# Patient Record
Sex: MALE | Race: WHITE | NOT HISPANIC OR LATINO | Employment: OTHER | URBAN - METROPOLITAN AREA
[De-identification: names, ages, dates, MRNs, and addresses within clinical notes are randomized per-mention and may not be internally consistent; named-entity substitution may affect disease eponyms.]

---

## 2018-02-19 ENCOUNTER — HOSPITAL ENCOUNTER (OUTPATIENT)
Dept: LAB | Facility: CLINIC | Age: 63
Discharge: HOME | End: 2018-02-19
Attending: ORTHOPAEDIC SURGERY | Admitting: ORTHOPAEDIC SURGERY
Payer: COMMERCIAL

## 2018-02-19 DIAGNOSIS — Z01.812 PRE-OPERATIVE LABORATORY EXAMINATION: ICD-10-CM

## 2018-02-19 LAB
MRSA DNA SPEC QL NAA+PROBE: NEGATIVE
SPECIMEN SOURCE: NORMAL

## 2018-02-19 PROCEDURE — 40000829 ZZHCL STATISTIC STAPH AUREUS SUSCEPT SCREEN PCR: Performed by: ORTHOPAEDIC SURGERY

## 2018-02-19 PROCEDURE — 87640 STAPH A DNA AMP PROBE: CPT | Performed by: ORTHOPAEDIC SURGERY

## 2018-02-19 PROCEDURE — 87641 MR-STAPH DNA AMP PROBE: CPT | Performed by: ORTHOPAEDIC SURGERY

## 2018-02-19 PROCEDURE — 40000830 ZZHCL STATISTIC STAPH AUREUS METH RESIST SCREEN PCR: Performed by: ORTHOPAEDIC SURGERY

## 2018-03-02 ENCOUNTER — TRANSFERRED RECORDS (OUTPATIENT)
Dept: HEALTH INFORMATION MANAGEMENT | Facility: CLINIC | Age: 63
End: 2018-03-02

## 2018-03-15 ENCOUNTER — ANESTHESIA (OUTPATIENT)
Dept: SURGERY | Facility: CLINIC | Age: 63
DRG: 470 | End: 2018-03-15
Payer: COMMERCIAL

## 2018-03-15 ENCOUNTER — HOSPITAL ENCOUNTER (INPATIENT)
Facility: CLINIC | Age: 63
LOS: 2 days | Discharge: HOME | DRG: 470 | End: 2018-03-17
Attending: ORTHOPAEDIC SURGERY | Admitting: ORTHOPAEDIC SURGERY
Payer: COMMERCIAL

## 2018-03-15 ENCOUNTER — ANESTHESIA EVENT (OUTPATIENT)
Dept: SURGERY | Facility: CLINIC | Age: 63
DRG: 470 | End: 2018-03-15
Payer: COMMERCIAL

## 2018-03-15 ENCOUNTER — APPOINTMENT (OUTPATIENT)
Dept: PHYSICAL THERAPY | Facility: CLINIC | Age: 63
DRG: 470 | End: 2018-03-15
Attending: ORTHOPAEDIC SURGERY
Payer: COMMERCIAL

## 2018-03-15 DIAGNOSIS — Z96.652 S/P TOTAL KNEE REPLACEMENT USING CEMENT, LEFT: Primary | ICD-10-CM

## 2018-03-15 LAB
CREAT SERPL-MCNC: 0.88 MG/DL (ref 0.66–1.25)
GFR SERPL CREATININE-BSD FRML MDRD: 87 ML/MIN/1.7M2
GLUCOSE BLDC GLUCOMTR-MCNC: 108 MG/DL (ref 70–99)
GLUCOSE BLDC GLUCOMTR-MCNC: 130 MG/DL (ref 70–99)
PLATELET # BLD AUTO: 215 10E9/L (ref 150–450)
POTASSIUM SERPL-SCNC: 4.6 MMOL/L (ref 3.4–5.3)

## 2018-03-15 PROCEDURE — 82565 ASSAY OF CREATININE: CPT | Performed by: PHYSICIAN ASSISTANT

## 2018-03-15 PROCEDURE — 36000063 ZZH SURGERY LEVEL 4 EA 15 ADDTL MIN: Performed by: ORTHOPAEDIC SURGERY

## 2018-03-15 PROCEDURE — 40000170 ZZH STATISTIC PRE-PROCEDURE ASSESSMENT II: Performed by: ORTHOPAEDIC SURGERY

## 2018-03-15 PROCEDURE — 25000125 ZZHC RX 250: Performed by: ORTHOPAEDIC SURGERY

## 2018-03-15 PROCEDURE — 27210995 ZZH RX 272: Performed by: ORTHOPAEDIC SURGERY

## 2018-03-15 PROCEDURE — 97161 PT EVAL LOW COMPLEX 20 MIN: CPT | Mod: GP | Performed by: PHYSICAL THERAPIST

## 2018-03-15 PROCEDURE — 25000128 H RX IP 250 OP 636: Performed by: PHYSICIAN ASSISTANT

## 2018-03-15 PROCEDURE — 25000125 ZZHC RX 250: Performed by: ANESTHESIOLOGY

## 2018-03-15 PROCEDURE — 71000012 ZZH RECOVERY PHASE 1 LEVEL 1 FIRST HR: Performed by: ORTHOPAEDIC SURGERY

## 2018-03-15 PROCEDURE — 36000093 ZZH SURGERY LEVEL 4 1ST 30 MIN: Performed by: ORTHOPAEDIC SURGERY

## 2018-03-15 PROCEDURE — 25000128 H RX IP 250 OP 636: Performed by: ANESTHESIOLOGY

## 2018-03-15 PROCEDURE — 25000128 H RX IP 250 OP 636: Performed by: NURSE ANESTHETIST, CERTIFIED REGISTERED

## 2018-03-15 PROCEDURE — 97110 THERAPEUTIC EXERCISES: CPT | Mod: GP | Performed by: PHYSICAL THERAPIST

## 2018-03-15 PROCEDURE — C1776 JOINT DEVICE (IMPLANTABLE): HCPCS | Performed by: ORTHOPAEDIC SURGERY

## 2018-03-15 PROCEDURE — 71000013 ZZH RECOVERY PHASE 1 LEVEL 1 EA ADDTL HR: Performed by: ORTHOPAEDIC SURGERY

## 2018-03-15 PROCEDURE — 0SRD0J9 REPLACEMENT OF LEFT KNEE JOINT WITH SYNTHETIC SUBSTITUTE, CEMENTED, OPEN APPROACH: ICD-10-PCS | Performed by: ORTHOPAEDIC SURGERY

## 2018-03-15 PROCEDURE — 84132 ASSAY OF SERUM POTASSIUM: CPT | Performed by: ANESTHESIOLOGY

## 2018-03-15 PROCEDURE — 27110028 ZZH OR GENERAL SUPPLY NON-STERILE: Performed by: ORTHOPAEDIC SURGERY

## 2018-03-15 PROCEDURE — 37000009 ZZH ANESTHESIA TECHNICAL FEE, EACH ADDTL 15 MIN: Performed by: ORTHOPAEDIC SURGERY

## 2018-03-15 PROCEDURE — 25000132 ZZH RX MED GY IP 250 OP 250 PS 637: Performed by: PHYSICIAN ASSISTANT

## 2018-03-15 PROCEDURE — 12000007 ZZH R&B INTERMEDIATE

## 2018-03-15 PROCEDURE — 25800025 ZZH RX 258: Performed by: ORTHOPAEDIC SURGERY

## 2018-03-15 PROCEDURE — 00000146 ZZHCL STATISTIC GLUCOSE BY METER IP

## 2018-03-15 PROCEDURE — 36415 COLL VENOUS BLD VENIPUNCTURE: CPT | Performed by: PHYSICIAN ASSISTANT

## 2018-03-15 PROCEDURE — 36415 COLL VENOUS BLD VENIPUNCTURE: CPT | Performed by: ANESTHESIOLOGY

## 2018-03-15 PROCEDURE — 97116 GAIT TRAINING THERAPY: CPT | Mod: GP | Performed by: PHYSICAL THERAPIST

## 2018-03-15 PROCEDURE — 37000008 ZZH ANESTHESIA TECHNICAL FEE, 1ST 30 MIN: Performed by: ORTHOPAEDIC SURGERY

## 2018-03-15 PROCEDURE — 85049 AUTOMATED PLATELET COUNT: CPT | Performed by: PHYSICIAN ASSISTANT

## 2018-03-15 PROCEDURE — 27210794 ZZH OR GENERAL SUPPLY STERILE: Performed by: ORTHOPAEDIC SURGERY

## 2018-03-15 PROCEDURE — 25000125 ZZHC RX 250: Performed by: NURSE ANESTHETIST, CERTIFIED REGISTERED

## 2018-03-15 PROCEDURE — 40000193 ZZH STATISTIC PT WARD VISIT: Performed by: PHYSICAL THERAPIST

## 2018-03-15 PROCEDURE — 27810169 ZZH OR IMPLANT GENERAL: Performed by: ORTHOPAEDIC SURGERY

## 2018-03-15 DEVICE — IMPLANTABLE DEVICE: Type: IMPLANTABLE DEVICE | Site: KNEE | Status: FUNCTIONAL

## 2018-03-15 DEVICE — BONE CEMENT STRK SIMPLEX P SPEEDSET 6192-1-001: Type: IMPLANTABLE DEVICE | Site: KNEE | Status: FUNCTIONAL

## 2018-03-15 RX ORDER — MEPERIDINE HYDROCHLORIDE 25 MG/ML
12.5 INJECTION INTRAMUSCULAR; INTRAVENOUS; SUBCUTANEOUS
Status: DISCONTINUED | OUTPATIENT
Start: 2018-03-15 | End: 2018-03-15

## 2018-03-15 RX ORDER — FENTANYL CITRATE 50 UG/ML
25-50 INJECTION, SOLUTION INTRAMUSCULAR; INTRAVENOUS
Status: DISCONTINUED | OUTPATIENT
Start: 2018-03-15 | End: 2018-03-15 | Stop reason: HOSPADM

## 2018-03-15 RX ORDER — PROCHLORPERAZINE MALEATE 10 MG
10 TABLET ORAL EVERY 6 HOURS PRN
Status: DISCONTINUED | OUTPATIENT
Start: 2018-03-15 | End: 2018-03-17 | Stop reason: HOSPADM

## 2018-03-15 RX ORDER — AMOXICILLIN 250 MG
2 CAPSULE ORAL 2 TIMES DAILY
Status: DISCONTINUED | OUTPATIENT
Start: 2018-03-15 | End: 2018-03-17 | Stop reason: HOSPADM

## 2018-03-15 RX ORDER — SODIUM CHLORIDE, SODIUM LACTATE, POTASSIUM CHLORIDE, CALCIUM CHLORIDE 600; 310; 30; 20 MG/100ML; MG/100ML; MG/100ML; MG/100ML
INJECTION, SOLUTION INTRAVENOUS CONTINUOUS
Status: DISCONTINUED | OUTPATIENT
Start: 2018-03-15 | End: 2018-03-15 | Stop reason: HOSPADM

## 2018-03-15 RX ORDER — FENTANYL CITRATE 50 UG/ML
INJECTION, SOLUTION INTRAMUSCULAR; INTRAVENOUS PRN
Status: DISCONTINUED | OUTPATIENT
Start: 2018-03-15 | End: 2018-03-15

## 2018-03-15 RX ORDER — ONDANSETRON 2 MG/ML
4 INJECTION INTRAMUSCULAR; INTRAVENOUS EVERY 6 HOURS PRN
Status: DISCONTINUED | OUTPATIENT
Start: 2018-03-15 | End: 2018-03-17 | Stop reason: HOSPADM

## 2018-03-15 RX ORDER — HYDROXYZINE HYDROCHLORIDE 25 MG/1
25 TABLET, FILM COATED ORAL EVERY 6 HOURS PRN
Status: DISCONTINUED | OUTPATIENT
Start: 2018-03-15 | End: 2018-03-17 | Stop reason: HOSPADM

## 2018-03-15 RX ORDER — ONDANSETRON 2 MG/ML
INJECTION INTRAMUSCULAR; INTRAVENOUS PRN
Status: DISCONTINUED | OUTPATIENT
Start: 2018-03-15 | End: 2018-03-15

## 2018-03-15 RX ORDER — SODIUM CHLORIDE, SODIUM LACTATE, POTASSIUM CHLORIDE, CALCIUM CHLORIDE 600; 310; 30; 20 MG/100ML; MG/100ML; MG/100ML; MG/100ML
INJECTION, SOLUTION INTRAVENOUS CONTINUOUS
Status: DISCONTINUED | OUTPATIENT
Start: 2018-03-15 | End: 2018-03-15 | Stop reason: ALTCHOICE

## 2018-03-15 RX ORDER — BUPIVACAINE HYDROCHLORIDE 7.5 MG/ML
INJECTION, SOLUTION INTRASPINAL PRN
Status: DISCONTINUED | OUTPATIENT
Start: 2018-03-15 | End: 2018-03-15

## 2018-03-15 RX ORDER — METOCLOPRAMIDE 5 MG/1
10 TABLET ORAL EVERY 6 HOURS PRN
Status: DISCONTINUED | OUTPATIENT
Start: 2018-03-15 | End: 2018-03-17 | Stop reason: HOSPADM

## 2018-03-15 RX ORDER — ATORVASTATIN CALCIUM 40 MG/1
40 TABLET, FILM COATED ORAL EVERY MORNING
Status: DISCONTINUED | OUTPATIENT
Start: 2018-03-16 | End: 2018-03-17 | Stop reason: HOSPADM

## 2018-03-15 RX ORDER — GABAPENTIN 600 MG/1
1200 TABLET ORAL AT BEDTIME
Status: DISCONTINUED | OUTPATIENT
Start: 2018-03-15 | End: 2018-03-17 | Stop reason: HOSPADM

## 2018-03-15 RX ORDER — ONDANSETRON 4 MG/1
4 TABLET, ORALLY DISINTEGRATING ORAL EVERY 30 MIN PRN
Status: DISCONTINUED | OUTPATIENT
Start: 2018-03-15 | End: 2018-03-15 | Stop reason: DRUGHIGH

## 2018-03-15 RX ORDER — NALOXONE HYDROCHLORIDE 0.4 MG/ML
.1-.4 INJECTION, SOLUTION INTRAMUSCULAR; INTRAVENOUS; SUBCUTANEOUS
Status: DISCONTINUED | OUTPATIENT
Start: 2018-03-15 | End: 2018-03-15

## 2018-03-15 RX ORDER — ONDANSETRON 4 MG/1
4 TABLET, ORALLY DISINTEGRATING ORAL EVERY 6 HOURS PRN
Status: DISCONTINUED | OUTPATIENT
Start: 2018-03-15 | End: 2018-03-17 | Stop reason: HOSPADM

## 2018-03-15 RX ORDER — NALOXONE HYDROCHLORIDE 0.4 MG/ML
.1-.4 INJECTION, SOLUTION INTRAMUSCULAR; INTRAVENOUS; SUBCUTANEOUS
Status: ACTIVE | OUTPATIENT
Start: 2018-03-15 | End: 2018-03-16

## 2018-03-15 RX ORDER — SODIUM CHLORIDE, SODIUM LACTATE, POTASSIUM CHLORIDE, CALCIUM CHLORIDE 600; 310; 30; 20 MG/100ML; MG/100ML; MG/100ML; MG/100ML
INJECTION, SOLUTION INTRAVENOUS CONTINUOUS
Status: DISCONTINUED | OUTPATIENT
Start: 2018-03-15 | End: 2018-03-15

## 2018-03-15 RX ORDER — CELECOXIB 200 MG/1
400 CAPSULE ORAL ONCE
Status: COMPLETED | OUTPATIENT
Start: 2018-03-15 | End: 2018-03-15

## 2018-03-15 RX ORDER — NALOXONE HYDROCHLORIDE 0.4 MG/ML
.1-.4 INJECTION, SOLUTION INTRAMUSCULAR; INTRAVENOUS; SUBCUTANEOUS
Status: DISCONTINUED | OUTPATIENT
Start: 2018-03-15 | End: 2018-03-17 | Stop reason: HOSPADM

## 2018-03-15 RX ORDER — ONDANSETRON 2 MG/ML
4 INJECTION INTRAMUSCULAR; INTRAVENOUS EVERY 30 MIN PRN
Status: DISCONTINUED | OUTPATIENT
Start: 2018-03-15 | End: 2018-03-15 | Stop reason: DRUGHIGH

## 2018-03-15 RX ORDER — MAGNESIUM HYDROXIDE 1200 MG/15ML
LIQUID ORAL PRN
Status: DISCONTINUED | OUTPATIENT
Start: 2018-03-15 | End: 2018-03-15 | Stop reason: HOSPADM

## 2018-03-15 RX ORDER — AMOXICILLIN 250 MG
1 CAPSULE ORAL 2 TIMES DAILY
Status: DISCONTINUED | OUTPATIENT
Start: 2018-03-15 | End: 2018-03-17 | Stop reason: HOSPADM

## 2018-03-15 RX ORDER — OXYCODONE HCL 10 MG/1
10 TABLET, FILM COATED, EXTENDED RELEASE ORAL EVERY 12 HOURS
Status: COMPLETED | OUTPATIENT
Start: 2018-03-15 | End: 2018-03-17

## 2018-03-15 RX ORDER — FENTANYL CITRATE 50 UG/ML
25-50 INJECTION, SOLUTION INTRAMUSCULAR; INTRAVENOUS
Status: DISCONTINUED | OUTPATIENT
Start: 2018-03-15 | End: 2018-03-15

## 2018-03-15 RX ORDER — HYDROMORPHONE HYDROCHLORIDE 1 MG/ML
.3-.5 INJECTION, SOLUTION INTRAMUSCULAR; INTRAVENOUS; SUBCUTANEOUS EVERY 10 MIN PRN
Status: DISCONTINUED | OUTPATIENT
Start: 2018-03-15 | End: 2018-03-15

## 2018-03-15 RX ORDER — METOCLOPRAMIDE HYDROCHLORIDE 5 MG/ML
10 INJECTION INTRAMUSCULAR; INTRAVENOUS EVERY 6 HOURS PRN
Status: DISCONTINUED | OUTPATIENT
Start: 2018-03-15 | End: 2018-03-17 | Stop reason: HOSPADM

## 2018-03-15 RX ORDER — HYDROMORPHONE HYDROCHLORIDE 1 MG/ML
.3-.5 INJECTION, SOLUTION INTRAMUSCULAR; INTRAVENOUS; SUBCUTANEOUS
Status: DISCONTINUED | OUTPATIENT
Start: 2018-03-15 | End: 2018-03-17 | Stop reason: HOSPADM

## 2018-03-15 RX ORDER — SODIUM CHLORIDE 9 MG/ML
INJECTION, SOLUTION INTRAVENOUS CONTINUOUS
Status: DISCONTINUED | OUTPATIENT
Start: 2018-03-15 | End: 2018-03-16

## 2018-03-15 RX ORDER — LIDOCAINE 40 MG/G
CREAM TOPICAL
Status: DISCONTINUED | OUTPATIENT
Start: 2018-03-15 | End: 2018-03-17 | Stop reason: HOSPADM

## 2018-03-15 RX ORDER — CEFAZOLIN SODIUM 2 G/100ML
2 INJECTION, SOLUTION INTRAVENOUS EVERY 8 HOURS
Status: COMPLETED | OUTPATIENT
Start: 2018-03-15 | End: 2018-03-16

## 2018-03-15 RX ORDER — ACETAMINOPHEN 325 MG/1
650 TABLET ORAL EVERY 4 HOURS PRN
Status: DISCONTINUED | OUTPATIENT
Start: 2018-03-18 | End: 2018-03-17 | Stop reason: HOSPADM

## 2018-03-15 RX ORDER — PROPOFOL 10 MG/ML
INJECTION, EMULSION INTRAVENOUS CONTINUOUS PRN
Status: DISCONTINUED | OUTPATIENT
Start: 2018-03-15 | End: 2018-03-15

## 2018-03-15 RX ORDER — ONDANSETRON 4 MG/1
4 TABLET, ORALLY DISINTEGRATING ORAL EVERY 30 MIN PRN
Status: DISCONTINUED | OUTPATIENT
Start: 2018-03-15 | End: 2018-03-15 | Stop reason: HOSPADM

## 2018-03-15 RX ORDER — CEFAZOLIN SODIUM 2 G/100ML
2 INJECTION, SOLUTION INTRAVENOUS
Status: COMPLETED | OUTPATIENT
Start: 2018-03-15 | End: 2018-03-15

## 2018-03-15 RX ORDER — OXYCODONE HYDROCHLORIDE 5 MG/1
5-10 TABLET ORAL
Status: DISCONTINUED | OUTPATIENT
Start: 2018-03-15 | End: 2018-03-17 | Stop reason: HOSPADM

## 2018-03-15 RX ORDER — GLYCOPYRROLATE 0.2 MG/ML
INJECTION, SOLUTION INTRAMUSCULAR; INTRAVENOUS PRN
Status: DISCONTINUED | OUTPATIENT
Start: 2018-03-15 | End: 2018-03-15

## 2018-03-15 RX ORDER — EPHEDRINE SULFATE 50 MG/ML
INJECTION, SOLUTION INTRAMUSCULAR; INTRAVENOUS; SUBCUTANEOUS PRN
Status: DISCONTINUED | OUTPATIENT
Start: 2018-03-15 | End: 2018-03-15

## 2018-03-15 RX ORDER — CEFAZOLIN SODIUM 1 G
1 VIAL (EA) INJECTION SEE ADMIN INSTRUCTIONS
Status: DISCONTINUED | OUTPATIENT
Start: 2018-03-15 | End: 2018-03-15 | Stop reason: HOSPADM

## 2018-03-15 RX ORDER — ONDANSETRON 2 MG/ML
4 INJECTION INTRAMUSCULAR; INTRAVENOUS EVERY 30 MIN PRN
Status: DISCONTINUED | OUTPATIENT
Start: 2018-03-15 | End: 2018-03-15 | Stop reason: HOSPADM

## 2018-03-15 RX ORDER — LIDOCAINE HYDROCHLORIDE 20 MG/ML
INJECTION, SOLUTION INFILTRATION; PERINEURAL PRN
Status: DISCONTINUED | OUTPATIENT
Start: 2018-03-15 | End: 2018-03-15

## 2018-03-15 RX ORDER — FENTANYL CITRATE 50 UG/ML
100 INJECTION, SOLUTION INTRAMUSCULAR; INTRAVENOUS
Status: COMPLETED | OUTPATIENT
Start: 2018-03-15 | End: 2018-03-15

## 2018-03-15 RX ORDER — ACETAMINOPHEN 325 MG/1
975 TABLET ORAL EVERY 8 HOURS
Status: DISCONTINUED | OUTPATIENT
Start: 2018-03-15 | End: 2018-03-17 | Stop reason: HOSPADM

## 2018-03-15 RX ORDER — HYDROMORPHONE HYDROCHLORIDE 1 MG/ML
.3-.5 INJECTION, SOLUTION INTRAMUSCULAR; INTRAVENOUS; SUBCUTANEOUS EVERY 5 MIN PRN
Status: DISCONTINUED | OUTPATIENT
Start: 2018-03-15 | End: 2018-03-15

## 2018-03-15 RX ORDER — HYDROMORPHONE HYDROCHLORIDE 1 MG/ML
.3-.5 INJECTION, SOLUTION INTRAMUSCULAR; INTRAVENOUS; SUBCUTANEOUS EVERY 5 MIN PRN
Status: DISCONTINUED | OUTPATIENT
Start: 2018-03-15 | End: 2018-03-15 | Stop reason: HOSPADM

## 2018-03-15 RX ADMIN — METFORMIN HYDROCHLORIDE 500 MG: 500 TABLET, FILM COATED ORAL at 17:51

## 2018-03-15 RX ADMIN — OXYCODONE HYDROCHLORIDE 10 MG: 10 TABLET, FILM COATED, EXTENDED RELEASE ORAL at 23:57

## 2018-03-15 RX ADMIN — ACETAMINOPHEN 975 MG: 325 TABLET, FILM COATED ORAL at 19:30

## 2018-03-15 RX ADMIN — OXYCODONE HYDROCHLORIDE 10 MG: 5 TABLET ORAL at 13:14

## 2018-03-15 RX ADMIN — Medication 5 MG: at 07:58

## 2018-03-15 RX ADMIN — CEFAZOLIN SODIUM 2 G: 2 INJECTION, SOLUTION INTRAVENOUS at 23:57

## 2018-03-15 RX ADMIN — FENTANYL CITRATE 50 MCG: 50 INJECTION, SOLUTION INTRAMUSCULAR; INTRAVENOUS at 07:40

## 2018-03-15 RX ADMIN — CEFAZOLIN SODIUM 2 G: 2 INJECTION, SOLUTION INTRAVENOUS at 15:50

## 2018-03-15 RX ADMIN — LIDOCAINE HYDROCHLORIDE 1 ML: 10 INJECTION, SOLUTION EPIDURAL; INFILTRATION; INTRACAUDAL; PERINEURAL at 06:54

## 2018-03-15 RX ADMIN — GABAPENTIN 1200 MG: 600 TABLET, FILM COATED ORAL at 18:00

## 2018-03-15 RX ADMIN — BUPIVACAINE HYDROCHLORIDE IN DEXTROSE 12 MG: 7.5 INJECTION, SOLUTION SUBARACHNOID at 07:49

## 2018-03-15 RX ADMIN — CELECOXIB 400 MG: 200 CAPSULE ORAL at 06:11

## 2018-03-15 RX ADMIN — MIDAZOLAM HYDROCHLORIDE 2 MG: 1 INJECTION, SOLUTION INTRAMUSCULAR; INTRAVENOUS at 07:17

## 2018-03-15 RX ADMIN — OXYCODONE HYDROCHLORIDE 10 MG: 5 TABLET ORAL at 22:23

## 2018-03-15 RX ADMIN — FENTANYL CITRATE 50 MCG: 50 INJECTION, SOLUTION INTRAMUSCULAR; INTRAVENOUS at 07:17

## 2018-03-15 RX ADMIN — SODIUM CHLORIDE, POTASSIUM CHLORIDE, SODIUM LACTATE AND CALCIUM CHLORIDE: 600; 310; 30; 20 INJECTION, SOLUTION INTRAVENOUS at 06:18

## 2018-03-15 RX ADMIN — CEFAZOLIN SODIUM 2 G: 2 INJECTION, SOLUTION INTRAVENOUS at 07:44

## 2018-03-15 RX ADMIN — ONDANSETRON 4 MG: 2 INJECTION INTRAMUSCULAR; INTRAVENOUS at 09:07

## 2018-03-15 RX ADMIN — SENNOSIDES AND DOCUSATE SODIUM 1 TABLET: 8.6; 5 TABLET ORAL at 22:23

## 2018-03-15 RX ADMIN — OXYCODONE HYDROCHLORIDE 10 MG: 10 TABLET, FILM COATED, EXTENDED RELEASE ORAL at 12:29

## 2018-03-15 RX ADMIN — ROPIVACAINE HYDROCHLORIDE 20 ML GIVEN: 5 INJECTION, SOLUTION EPIDURAL; INFILTRATION; PERINEURAL at 07:10

## 2018-03-15 RX ADMIN — SODIUM CHLORIDE, POTASSIUM CHLORIDE, SODIUM LACTATE AND CALCIUM CHLORIDE: 600; 310; 30; 20 INJECTION, SOLUTION INTRAVENOUS at 09:22

## 2018-03-15 RX ADMIN — PROPOFOL 80 MCG/KG/MIN: 10 INJECTION, EMULSION INTRAVENOUS at 07:45

## 2018-03-15 RX ADMIN — LIDOCAINE HYDROCHLORIDE 60 MG: 20 INJECTION, SOLUTION INFILTRATION; PERINEURAL at 07:45

## 2018-03-15 RX ADMIN — SODIUM CHLORIDE: 9 INJECTION, SOLUTION INTRAVENOUS at 14:28

## 2018-03-15 RX ADMIN — OXYCODONE HYDROCHLORIDE 10 MG: 5 TABLET ORAL at 16:12

## 2018-03-15 RX ADMIN — OXYCODONE HYDROCHLORIDE 10 MG: 5 TABLET ORAL at 19:29

## 2018-03-15 RX ADMIN — HYDROMORPHONE HYDROCHLORIDE 0.5 MG: 1 INJECTION, SOLUTION INTRAMUSCULAR; INTRAVENOUS; SUBCUTANEOUS at 12:29

## 2018-03-15 RX ADMIN — ACETAMINOPHEN 975 MG: 325 TABLET, FILM COATED ORAL at 12:30

## 2018-03-15 RX ADMIN — HYDROMORPHONE HYDROCHLORIDE 0.5 MG: 1 INJECTION, SOLUTION INTRAMUSCULAR; INTRAVENOUS; SUBCUTANEOUS at 14:28

## 2018-03-15 RX ADMIN — MIDAZOLAM 2 MG: 1 INJECTION INTRAMUSCULAR; INTRAVENOUS at 07:30

## 2018-03-15 RX ADMIN — GLYCOPYRROLATE 0.2 MG: 0.2 INJECTION, SOLUTION INTRAMUSCULAR; INTRAVENOUS at 07:46

## 2018-03-15 ASSESSMENT — ACTIVITIES OF DAILY LIVING (ADL): COGNITION: 0 - NO COGNITION ISSUES REPORTED

## 2018-03-15 NOTE — OP NOTE
Procedure Date: 03/15/2018      DATE OF PROCEDURE:  03/15/2018        PREOPERATIVE DIAGNOSIS:  Left knee degenerative joint disease.      POSTOPERATIVE DIAGNOSES:  Left knee degenerative joint disease.      PROCEDURE:  Left total knee arthroplasty.      SURGEON:  Tommie Gramajo MD      FIRST ASSISTANT:  Gita Burch PA-C      ANESTHESIA TYPE:  Spinal with adductor canal block.      TOURNIQUET TIME:  75 minutes.      DESCRIPTION OF PROCEDURE:  After identification of the patient, correct extremity, and review of informed consent, the patient was brought to the operating room where spinal anesthesia was induced.  Preoperative antibiotics were given.  The patient's left lower extremity was then prepped and draped in the usual sterile manner.  After observation and surgical pause, the leg was exsanguinated and the tourniquet was inflated.  Standard midline incision was then made 2 fingerbreadths superior to the proximal pole of the patella and centered over the medial aspect of the tibial tubercle.  Dissection was taken down sharply through subcutaneous tissues and medial and lateral skin flaps were elevated.  Medial parapatellar arthrotomy was then performed.  The medial release was performed.  The soft tissues from the distal anterior femur were excised.  The patella was everted and the retropatellar fat pad was excised.  The starting reamer was then placed in the intramedullary canal from the correct starting port on the distal femur.  The distal femoral cutting guide was placed on 5-degree valgus cut with an 11 mm resection and pinned.  Distal femur was resected.  Extramedullary tibial guide was then placed in line with the anteromedial border of the tibia.  A 12 mm resection off the lateral side was then set with a 0-degree slope.  The proximal tibia was resected.  Extension gap was then balanced with 7 mm spacer block.  The knee was then flexed and the distal femur was sized to a size 10, corresponding with  his previous surgery on the right.  The 4-in-1 cutting block was then pinned in 3 degrees of external rotation.  The anterior, posterior femoral cuts were made.  The flexion gap was balanced with a 7 mm spacer block.  The anterior and posterior chamfer cuts were made.  Cutting guide was removed.  The box cutting guide was placed.  The box cut was made in slightly lateral position.  The medial and lateral menisci were excised.  Posterior osteophytes from the medial and lateral femoral condyles were removed with curved osteotome.  The tibia was then subluxated anteriorly and the tibia was sized to a size 10 tibial component.  External rotation was set at the junction of medial middle third.  The proximal tibia was then prepped for a rotating platform tibial component.  The trial was left in place.  Trial femoral component was impacted into place.  Size 6 posterior stabilized rotating platform trial poly was then placed and the knee was tested for stability and range of motion.  The patient was noted to have 2 degrees of hyperextension, 130 degrees of flexion, and to be balanced with both varus and valgus stress.  The patella did track centrally within the trochlea.  Patellar width was noted to be 25 mm.  A 10 mm resection for a 15 mm remnant was then performed.  The 41 mm patellar component was noted be appropriate size.  Lug holes were drilled.  Trial component was placed and again the patella was noted to track centrally within the trochlea.  The femoral component was lugged.  Trial components were removed.  The cut ends of the bone were then irrigated with copious normal saline via pulse lavage to remove all marrow elements.  The real size 10 [employer] Attune RP tibial component was then cemented into place.  The real size 10 posterior stabilized femoral component was cemented into place.  The 6 mm highly cross-linked posterior stabilized RP poly was then placed and the knee was reduced.  The real 41 mm  highly cross-linked patellar component was then cemented into place.  Patella was clamped.  The knee was then allowed to sit in a Betadine diluted solution until adequate hardening of the cement was obtained.  The knee was then irrigated with copious normal saline via pulse lavage.  The knee was placed in 90 degrees of flexion and the arthrotomy was closed with interrupted 0 Vicryl sutures.  2-0 Monocryl was placed subcutaneously in the incision sites.  Staples were placed in the epidermis.  Sterile dressing was applied.  The tourniquet was deflated.  The patient was then recovered briefly in the operating room and transferred to the PACU for further recovery.  The patient tolerated the procedure well.  There were no known complications.      Gita Burch PA-C, was present for the entire portion of the procedure and her assistance was critical in patient positioning, prepping, draping, implantation of prosthetic device, deep wound closure, superficial wound closure, dressing placement, and patient transfer.         KATE PAPPAS MD             D: 03/15/2018   T: 03/15/2018   MT: WILLY      Name:     BLAKE MACIAS   MRN:      [id number]        Account:        [id number]   :      1955           Procedure Date: 03/15/2018      Document: [id number]

## 2018-03-15 NOTE — ANESTHESIA PREPROCEDURE EVALUATION
Anesthesia Evaluation     . Pt has had prior anesthetic.     No history of anesthetic complications          ROS/MED HX    ENT/Pulmonary:      (-) sleep apnea   Neurologic:       Cardiovascular:         METS/Exercise Tolerance:     Hematologic:         Musculoskeletal:         GI/Hepatic:        (-) GERD   Renal/Genitourinary:         Endo:     (+) type I DM (metformin), .      Psychiatric:         Infectious Disease:         Malignancy:         Other:                     Physical Exam  Normal systems: dental    Airway   Mallampati: II  TM distance: >3 FB  Neck ROM: full    Dental     Cardiovascular   Rhythm and rate: regular      Pulmonary    breath sounds clear to auscultation                    Anesthesia Plan      History & Physical Review  History and physical reviewed and following examination; no interval change.    ASA Status:  2 .        Plan for Spinal and Periph. Nerve Block for postop pain with Intravenous induction.   PONV prophylaxis:  Ondansetron (or other 5HT-3)       Postoperative Care  Postoperative pain management:  IV analgesics and Peripheral nerve block (Single Shot).      Consents  Anesthetic plan, risks, benefits and alternatives discussed with:  Patient..                          .

## 2018-03-15 NOTE — BRIEF OP NOTE
[facility] Brief Operative Note    Pre-operative diagnosis: LEFT knee DJD   Post-operative diagnosis same   Procedure: Procedure(s):  LEFT TOTAL KNEE ARTHROPLASTY (DEPUY)^ - Wound Class: I-Clean   Surgeon(s): Surgeon(s) and Role:     * Tommie Gramajo MD - Primary     * Gita Burch PA-C - Assisting   Estimated blood loss: 50 mL   Specimens: * No specimens in log *   Findings: See op report

## 2018-03-15 NOTE — PLAN OF CARE
Problem: Knee Arthroplasty (Total, Partial) (Adult)  Goal: Signs and Symptoms of Listed Potential Problems Will be Absent, Minimized or Managed (Knee Arthroplasty)  Signs and symptoms of listed potential problems will be absent, minimized or managed by discharge/transition of care (reference Knee Arthroplasty (Total, Partial) (Adult) CPG).   Outcome: No Change  Pt is DOS and arrived around 1145, A&Ox4, IV infusing and VSS on RA. Pt is on a regular diet and tolerating that well. Pain treated with oxycontin, oxycodone, IV dilaudid and scheduled tylenol. Dressing is C/D/I and CMS intact with +2 pulses. BG in PACU was 108. He had spinal anesthesia with femoral block and EBL of 50cc. Pt has not gotten OOB or dangled yet, first therapy is at 1500. Pt is progressing well per plan of care.

## 2018-03-15 NOTE — PLAN OF CARE
Problem: Patient Care Overview  Goal: Plan of Care/Patient Progress Review  PT: Orders received, eval completed, treatment initiated.  Pt is POD #0 s/p L TKA, previous R TKA ~1.5 yrs ago.  Lives in 2 story home with spouse, IND with all mobility and ADLs at baseline.    Discharge Planner PT   Patient plan for discharge: Home with OPPT  Current status: SBA supine <> sit, CGA sit <> stand, CGA with FWW x100', ROM: 0-15-78, no KI and no knee buckling noted  Barriers to return to prior living situation: None anticipated will address stairs in therapy  Recommendations for discharge: Home with spouse assisting with stairs, OPPT  Rationale for recommendations: Anticipate pt will demonstrate adequate mobility for safe discharge home with OPPT for ROM, strengthening, and gait mechanics       Entered by: Renee Ovalle 03/15/2018 3:58 PM

## 2018-03-15 NOTE — IP AVS SNAPSHOT
[address] [facility]    [address] [place] [address]    Phone:  [phone]                                       After Visit Summary   3/15/2018    Damien Dacosta Jr.    MRN: [id number]           After Visit Summary Signature Page     I have received my discharge instructions, and my questions have been answered. I have discussed any challenges I see with this plan with the nurse or doctor.    ..........................................................................................................................................  Patient/Patient Representative Signature      ..........................................................................................................................................  Patient Representative Print Name and Relationship to Patient    ..................................................               ................................................  Date                                            Time    ..........................................................................................................................................  Reviewed by Signature/Title    ...................................................              ..............................................  Date                                                            Time

## 2018-03-15 NOTE — ANESTHESIA PROCEDURE NOTES
Peripheral nerve/Neuraxial procedure note : intrathecal  Pre-Procedure  Performed by RYAN SANDOVAL  Location: OR      Pre-Anesthestic Checklist: patient identified, IV checked, risks and benefits discussed, informed consent, monitors and equipment checked and pre-op evaluation    Timeout  Correct Patient: Yes   Correct Procedure: Yes   Correct Site: Yes   Correct Laterality: N/A   Correct Position: Yes   Site Marked: N/A   .   Procedure Documentation    .    Procedure:    Intrathecal.  Insertion Site:L3-4  (midline approach)      Patient Prep;mask, sterile gloves, povidone-iodine 7.5% surgical scrub, patient draped.  .  Needle: Enzo tip Spinal Needle (gauge): 25  Spinal/LP Needle Length (inches): 3.5 # of attempts: 1 and # of redirects:  Introducer used .       Assessment/Narrative  Paresthesias: No.  .  .  clear CSF fluid removed while sitting   . Comments:  Subarachnoid Block  1.6 ml 0.75% bupivacaine with dextrose

## 2018-03-15 NOTE — IP AVS SNAPSHOT
MRN:[id number]                      After Visit Summary   3/15/2018    Damien Dacosta Jr.    MRN: [id number]           Thank you!     Thank you for choosing [facility] for your care. Our goal is always to provide you with excellent care. Hearing back from our patients is one way we can continue to improve our services. Please take a few minutes to complete the written survey that you may receive in the mail after you visit with us. Thank you!        Patient Information     Date Of Birth          1955        Designated Caregiver       Most Recent Value    Caregiver    Will someone help with your care after discharge? yes    Name of designated caregiver brody    Phone number of caregiver [phone]    Caregiver address [address]      About your hospital stay     You were admitted on:  March 15, 2018 You last received care in the:  [place] [address] [facility]    You were discharged on:  March 17, 2018        Reason for your hospital stay       S/P left total knee arthroplasty                  Who to Call     For medical emergencies, please call 911.  For non-urgent questions about your medical care, please call your primary care provider or clinic, [phone]  For questions related to your surgery, please call your surgery clinic        Attending Provider     Provider Specialty    Tommie Gramajo MD Orthopedics       Primary Care Provider Office Phone # Fax #    Rosalva Lewis [phone]      After Care Instructions     Activity       Your activity upon discharge: activity as tolerated            Diet       Follow this diet upon discharge: Regular            Wound care and dressings       Instructions to care for your wound at home: daily dressing changes.                  Follow-up Appointments     Follow-up and recommended labs and tests        Follow up with Dr. Gramajo in clinic in 2 weeks.  Please call [phone] with questions.              "     Additional Services     Physical Therapy Referral       *This therapy referral will be filtered to a centralized scheduling office at [facility] and the patient will receive a call to schedule an appointment at a [place] location most convenient for them. *     [facility] provides Physical Therapy evaluation and treatment and many specialty services across the [place] system.  If requesting a specialty program, please choose from the list below.    If you have not heard from the scheduling office within 2 business days, please call [phone] for all locations, with the exception of [place], please call [phone] and [facility], please call [phone]  Treatment: Evaluation & Treatment  Special Instructions/Modalities: OP PT at [facility]  Special Programs: None    Please be aware that coverage of these services is subject to the terms and limitations of your health insurance plan.  Call member services at your health plan with any benefit or coverage questions.      **Note to Provider:  If you are referring outside of [place] for the therapy appointment, please list the name of the location in the \"special instructions\" above, print the referral and give to the patient to schedule the appointment.                  Pending Results     No orders found from 3/13/2018 to 3/16/2018.            Statement of Approval     Ordered          03/16/18 2146  I have reviewed and agree with all the recommendations and orders detailed in this document.  EFFECTIVE NOW     Approved and electronically signed by:  Gita Burch PA-C             Admission Information     Date & Time Provider Department Dept. Phone    3/15/2018 Tommie Gramajo MD [place] [address] [facility] [phone]      Your Vitals Were     Blood Pressure Pulse Temperature Respirations Height Weight    126/68 (BP Location: Right arm) 88 99.1  F (37.3  C) (Oral) 16 1.88 m (6' 2\") 118.4 " "kg (261 lb)    Pulse Oximetry BMI (Body Mass Index)                95% 33.51 kg/m2          [employer] Information     [employer] lets you send messages to your doctor, view your test results, renew your prescriptions, schedule appointments and more. To sign up, go to [email or web address] . Click on \"Log in\" on the left side of the screen, which will take you to the Welcome page. Then click on \"Sign up Now\" on the right side of the page.     You will be asked to enter the access code listed below, as well as some personal information. Please follow the directions to create your username and password.     Your access code is: [email or web address]  Expires: 6/15/2018 12:50 PM     Your access code will  in 90 days. If you need help or a new code, please call your [facility] clinic or [phone].        Care EveryWhere ID     This is your Care EveryWhere ID. This could be used by other organizations to access your [facility] medical records  [id number]        Equal Access to Services     [facility] Hospital[facility] : Hadshasha ruffin Sobacilio, waaxda luqadaha, qaybta kaalmadebi andrade, mark castillo . So [facility] [phone].    ATENCIÓN: Si habla español, tiene a tavares disposición servicios gratuitos de asistencia lingüística. Llame al [phone].    We comply with applicable federal civil rights laws and [place] laws. We do not discriminate on the basis of race, color, national origin, age, disability, sex, sexual orientation, or gender identity.               Review of your medicines      START taking        Dose / Directions    enoxaparin 40 MG/0.4ML injection   Commonly known as:  LOVENOX        Dose:  40 mg   Inject 0.4 mLs (40 mg) Subcutaneous every 24 hours for 12 days   Quantity:  4.8 mL   Refills:  0       oxyCODONE IR 5 MG tablet   Commonly known as:  ROXICODONE        Dose:  5-10 mg   Take 1-2 tablets (5-10 mg) by mouth every 3 hours as needed for other (pain control or improvement in physical " function. Hold dose for analgesic side effects.)   Quantity:  18 tablet   Refills:  0       senna-docusate 8.6-50 MG per tablet   Commonly known as:  SENOKOT-S;PERICOLACE        Dose:  1 tablet   Take 1 tablet by mouth 2 times daily   Quantity:  60 tablet   Refills:  0         CONTINUE these medicines which may have CHANGED, or have new prescriptions. If we are uncertain of the size of tablets/capsules you have at home, strength may be listed as something that might have changed.        Dose / Directions    acetaminophen 325 MG tablet   Commonly known as:  TYLENOL   This may have changed:    - medication strength  - when to take this        Dose:  650 mg   Take 2 tablets (650 mg) by mouth every 4 hours as needed for mild pain   Quantity:  60 tablet   Refills:  0       * order for DME   This may have changed:  Another medication with the same name was added. Make sure you understand how and when to take each.   Used for:  S/P total knee replacement using cement, right        Equipment being ordered: Walker Wheels () and Walker () Treatment Diagnosis: Impaired gait   Quantity:  1 each   Refills:  0       * order for DME   This may have changed:  You were already taking a medication with the same name, and this prescription was added. Make sure you understand how and when to take each.        Equipment being ordered: Cane () Treatment Diagnosis: Impaired gait   Quantity:  1 each   Refills:  0       * Notice:  This list has 2 medication(s) that are the same as other medications prescribed for you. Read the directions carefully, and ask your doctor or other care provider to review them with you.      CONTINUE these medicines which have NOT CHANGED        Dose / Directions    ASCENSIA CONTOUR MONITOR        None Entered   Refills:  0       ASCENSIA CONTOUR TEST VI        None Entered   Refills:  0       GABAPENTIN PO        Dose:  1200 mg   Take 1,200 mg by mouth At Bedtime (Patient takes 4 x 300mg  capsules= 1,200mg) 2 hours before going to bed.   Refills:  0       LIPITOR PO        Dose:  40 mg   Take 40 mg by mouth every morning   Refills:  0       METFORMIN HCL PO        Dose:  500 mg   Take 500 mg by mouth 2 times daily (with meals)   Refills:  0         STOP taking     ASPIRIN EC PO                Where to get your medicines      These medications were sent to [facility] [place], [place] - [address]  [address], [place] [address]     Phone:  [phone]     acetaminophen 325 MG tablet    enoxaparin 40 MG/0.4ML injection    senna-docusate 8.6-50 MG per tablet         Some of these will need a paper prescription and others can be bought over the counter. Ask your nurse if you have questions.     Bring a paper prescription for each of these medications     order for DME    oxyCODONE IR 5 MG tablet                Protect others around you: Learn how to safely use, store and throw away your medicines at www.disposemymeds.org.        Information about OPIOIDS     PRESCRIPTION OPIOIDS: WHAT YOU NEED TO KNOW    Prescription opioids can be used to help relieve moderate to severe pain and are often prescribed following a surgery or injury, or for certain health conditions. These medications can be an important part of treatment but also come with serious risks. It is important to work with your health care provider to make sure you are getting the safest, most effective care.    WHAT ARE THE RISKS AND SIDE EFFECTS OF OPIOID USE?  Prescription opioids carry serious risks of addiction and overdose, especially with prolonged use. An opioid overdose, often marked by slowed breathing can cause sudden death. The use of prescription opioids can have a number of side effects as well, even when taken as directed:      Tolerance - meaning you might need to take more of a medication for the same pain relief    Physical dependence - meaning you have symptoms of withdrawal when a medication is  stopped    Increased sensitivity to pain    Constipation    Nausea, vomiting, and dry mouth    Sleepiness and dizziness    Confusion    Depression    Low levels of testosterone that can result in lower sex drive, energy, and strength    Itching and sweating    RISKS ARE GREATER WITH:    History of drug misuse, substance use disorder, or overdose    Mental health conditions (such as depression or anxiety)    Sleep apnea    Older age (65 years or older)    Pregnancy    Avoid alcohol while taking prescription opioids.   Also, unless specifically advised by your health care provider, medications to avoid include:    Benzodiazepines (such as Xanax or Valium)    Muscle relaxants (such as Soma or Flexeril)    Hypnotics (such as Ambien or Lunesta)    Other prescription opioids    KNOW YOUR OPTIONS:  Talk to your health care provider about ways to manage your pain that do not involve prescription opioids. Some of these options may actually work better and have fewer risks and side effects:    Pain relievers such as acetaminophen, ibuprofen, and naproxen    Some medications that are also used for depression or seizures    Physical therapy and exercise    Cognitive behavioral therapy, a psychological, goal-directed approach, in which patients learn how to modify physical, behavioral, and emotional triggers of pain and stress    IF YOU ARE PRESCRIBED OPIOIDS FOR PAIN:    Never take opioids in greater amounts or more often than prescribed    Follow up with your primary health care provider and work together to create a plan on how to manage your pain.    Talk about ways to help manage your pain that do not involve prescription opioids    Talk about all concerns and side effects    Help prevent misuse and abuse    Never sell or share prescription opioids    Never use another person's prescription opioids    Store prescription opioids in a secure place and out of reach of others (this may include visitors, children, friends, and  family)    Visit www.cdc.gov/drugoverdose to learn about risks of opioid abuse and overdose    If you believe you may be struggling with addiction, tell your health care provider and ask for guidance or call [facility]'s National Helpline at 0-316-867-HELP    LEARN MORE / www.cdc.gov/drugoverdose/prescribing/guideline.html    Safely dispose of unused prescription opioids: Find your local drug take-back programs and more information about the importance of safe disposal at www.doseofreality.[place].gov             Medication List: This is a list of all your medications and when to take them. Check marks below indicate your daily home schedule. Keep this list as a reference.      Medications           Morning Afternoon Evening Bedtime As Needed    acetaminophen 325 MG tablet   Commonly known as:  TYLENOL   Take 2 tablets (650 mg) by mouth every 4 hours as needed for mild pain   Last time this was given:  975 mg on 3/16/2018  8:10 PM                                ASCENSIA CONTOUR MONITOR   None Entered                                ASCENSIA CONTOUR TEST VI   None Entered                                enoxaparin 40 MG/0.4ML injection   Commonly known as:  LOVENOX   Inject 0.4 mLs (40 mg) Subcutaneous every 24 hours for 12 days   Last time this was given:  40 mg on 3/17/2018  8:11 AM                                GABAPENTIN PO   Take 1,200 mg by mouth At Bedtime (Patient takes 4 x 300mg capsules= 1,200mg) 2 hours before going to bed.   Last time this was given:  1,200 mg on 3/16/2018  6:27 PM                                LIPITOR PO   Take 40 mg by mouth every morning   Last time this was given:  40 mg on 3/17/2018  8:10 AM                                METFORMIN HCL PO   Take 500 mg by mouth 2 times daily (with meals)   Last time this was given:  500 mg on 3/17/2018  8:10 AM                                * order for DME   Equipment being ordered: Walker Wheels () and Walker () Treatment Diagnosis: Impaired gait                                 * order for DME   Equipment being ordered: Cane () Treatment Diagnosis: Impaired gait                                oxyCODONE IR 5 MG tablet   Commonly known as:  ROXICODONE   Take 1-2 tablets (5-10 mg) by mouth every 3 hours as needed for other (pain control or improvement in physical function. Hold dose for analgesic side effects.)   Last time this was given:  10 mg on 3/17/2018  8:10 AM                                senna-docusate 8.6-50 MG per tablet   Commonly known as:  SENOKOT-S;PERICOLACE   Take 1 tablet by mouth 2 times daily   Last time this was given:  2 tablets on 3/17/2018  8:10 AM                                * Notice:  This list has 2 medication(s) that are the same as other medications prescribed for you. Read the directions carefully, and ask your doctor or other care provider to review them with you.

## 2018-03-15 NOTE — ANESTHESIA CARE TRANSFER NOTE
Patient: Damien Dacosta Jr.    Procedure(s):  LEFT TOTAL KNEE ARTHROPLASTY - Wound Class: I-Clean    Diagnosis: djd  Diagnosis Additional Information: No value filed.    Anesthesia Type:   Spinal, Periph. Nerve Block for postop pain     Note:  Airway :Face Mask  Patient transferred to:PACU  Comments: Transferred to PACU, spontaneous RR, on room air.  Monitors and alarms on and functioning, VSS, patient awake and comfortable.  Report to PACU RN.Handoff Report: Identifed the Patient, Identified the Reponsible Provider, Reviewed the pertinent medical history, Discussed the surgical course, Reviewed Intra-OP anesthesia mangement and issues during anesthesia, Set expectations for post-procedure period and Allowed opportunity for questions and acknowledgement of understanding      Vitals: (Last set prior to Anesthesia Care Transfer)    CRNA VITALS  3/15/2018 0856 - 3/15/2018 0933      3/15/2018             Pulse: 78    SpO2: 98 %    Resp Rate (observed): (!)  1    Resp Rate (set): 10                Electronically Signed By: LINN Ovalle CRNA  March 15, 2018  9:33 AM

## 2018-03-15 NOTE — ANESTHESIA POSTPROCEDURE EVALUATION
Patient: Damien Dacosta Jr.    Procedure(s):  LEFT TOTAL KNEE ARTHROPLASTY - Wound Class: I-Clean    Diagnosis:djd  Diagnosis Additional Information: No value filed.    Anesthesia Type:  Spinal, Periph. Nerve Block for postop pain    Note:  Anesthesia Post Evaluation    Patient location during evaluation: PACU  Patient participation: Able to fully participate in evaluation  Level of consciousness: awake and alert  Pain management: adequate  Airway patency: patent  Cardiovascular status: acceptable and hemodynamically stable  Respiratory status: acceptable and unassisted  Hydration status: acceptable  PONV: none     Anesthetic complications: None          Last vitals:  Vitals:    03/15/18 1110 03/15/18 1120 03/15/18 1131   BP: 106/64 111/62    Resp: 16 16    Temp:      SpO2: 98% 98% 97%         Electronically Signed By: Theodore Suh MD  March 15, 2018  11:35 AM

## 2018-03-15 NOTE — OR NURSING
1050hr - [facility] rounded on pt. Pt awake, VS & surgical sites are stable.  Okay for pt to transfer to floor per [facility].  1110hr - Pt report given and cares now handed over to abelino DORSEY.   Pt ready for transfer to floor pending readiness of private rm per pt request.

## 2018-03-15 NOTE — ANESTHESIA PROCEDURE NOTES
Peripheral nerve/Neuraxial procedure note : femoral  Pre-Procedure  Performed by RYAN SANDOVAL  Location: pre-op      Pre-Anesthestic Checklist: patient identified, IV checked, site marked, risks and benefits discussed, informed consent, monitors and equipment checked, at physician/surgeon's request and post-op pain management    Timeout  Correct Patient: Yes   Correct Procedure: Yes   Correct Site: Yes   Correct Laterality: Yes   Correct Position: Yes   Site Marked: Yes   .   Procedure Documentation    .    Procedure:  left  Femoral.  Local skin infiltrated with 3 mL of 1% lidocaine.     Ultrasound used to identify targeted nerve, plexus, or vascular marker and placed a needle adjacent to it., Ultrasound was used to visualize the spread of the anesthetic in close proximity to the above stated nerve. A permanent image is entered into the patient's record.  Patient Prep;mask, sterile gloves, chlorhexidine gluconate and isopropyl alcohol, patient draped.  Nerve Stim: Initial Level 1 mA. Lowest motor response mA..  Needle: insulated, short bevel Needle Gauge: 20.    Needle Length (Inches) 2  Insertion Method: Single Shot.       Assessment/Narrative  Paresthesias: No.  .  The placement was negative for: blood aspirated, painful injection and site bleeding.  Bolus given via needle..   Secured via.   Complications: none. Comments:  Single shot adductor canal nerve block  20 ml 0.5% Ropivacaine with 1:400,000 Epinephrine

## 2018-03-15 NOTE — PROGRESS NOTES
03/15/18 1510   Quick Adds   Type of Visit Initial PT Evaluation   Living Environment   Lives With spouse   Living Arrangements house   Number of Stairs to Enter Home 2  (no rail)   Number of Stairs Within Home 20  (rail on R for 1/2, rail on L for 1/2)   Transportation Available car;family or friend will provide   Self-Care   Usual Activity Tolerance good   Current Activity Tolerance moderate   Equipment Currently Used at Home walker, rolling   Functional Level Prior   Ambulation 0-->independent   Transferring 0-->independent   Toileting 0-->independent   Bathing 0-->independent   Dressing 0-->independent   Eating 0-->independent   Communication 0-->understands/communicates without difficulty   Swallowing 0-->swallows foods/liquids without difficulty   Cognition 0 - no cognition issues reported   Fall history within last six months no   Which of the above functional risks had a recent onset or change? ambulation;transferring   Prior Functional Level Comment IND with functional mobility and ADLs   General Information   Onset of Illness/Injury or Date of Surgery - Date 03/15/18   Referring Physician Gita Burch PA-C   Patient/Family Goals Statement home with OPPT   Pertinent History of Current Problem (include personal factors and/or comorbidities that impact the POC) Pt is POD #0 s/p L TKA, previous R TKA ~1.5yrs ago   Precautions/Limitations fall precautions   Weight-Bearing Status - LLE weight-bearing as tolerated   General Info Comments Activity: Ambulate with assist   Cognitive Status Examination   Orientation orientation to person, place and time   Level of Consciousness alert   Follows Commands and Answers Questions 100% of the time   Personal Safety and Judgment intact   Memory intact   Pain Assessment   Patient Currently in Pain (rates pain 4/10 at rest)   Integumentary/Edema   Integumentary/Edema Comments ACE bandage on LLE   Range of Motion (ROM)   ROM Comment RLE: WFL, LLE: hip/ankle WFL,  "knee: 0-15-78   Strength   Strength Comments WFL in BLE, IND with SLR   Bed Mobility   Bed Mobility Comments SBA supine <> sit   Transfer Skills   Transfer Comments CGA sit <> stand with FWW   Gait   Gait Comments CGA with FWW x100', step to pattern, decreased weight shifting onto LLE   Balance   Balance Comments Good, no LOB/lateral path deviaiton noted with static/dynamic standing activities   Sensory Examination   Sensory Perception Comments baseline neuropathy in feet   General Therapy Interventions   Planned Therapy Interventions balance training;bed mobility training;gait training;ROM;stretching;strengthening;transfer training;risk factor education;progressive activity/exercise;home program guidelines   Clinical Impression   Criteria for Skilled Therapeutic Intervention yes, treatment indicated   PT Diagnosis decreased functional mobility   Influenced by the following impairments pain   Functional limitations due to impairments bed mobility, transfers, ambulation, stair climbing   Clinical Presentation Stable/Uncomplicated   Clinical Presentation Rationale per clinical judgement   Clinical Decision Making (Complexity) Low complexity   Therapy Frequency` 2 times/day   Predicted Duration of Therapy Intervention (days/wks) 4 days   Anticipated Discharge Disposition Home with Outpatient Therapy   Risk & Benefits of therapy have been explained Yes   Patient, Family & other staff in agreement with plan of care Yes   [facility] [employer] \"6 Clicks\"   2016, Trustees of [facility], under license to [employer].  All rights reserved.   6 Clicks Short Forms Basic Mobility Inpatient Short Form   [facility]  \"6 Clicks\" V.2 Basic Mobility Inpatient Short Form   1. Turning from your back to your side while in a flat bed without using bedrails? 4 - None   2. Moving from lying on your back to sitting on the side of a flat bed without using bedrails? 4 - None   3. Moving to and from a bed to a chair " (including a wheelchair)? 3 - A Little   4. Standing up from a chair using your arms (e.g., wheelchair, or bedside chair)? 3 - A Little   5. To walk in hospital room? 3 - A Little   6. Climbing 3-5 steps with a railing? 3 - A Little   Basic Mobility Raw Score (Score out of 24.Lower scores equate to lower levels of function) 20   Total Evaluation Time   Total Evaluation Time (Minutes) 8

## 2018-03-15 NOTE — PROGRESS NOTES
Admission medication history interview status for the 3/15/2018  admission is complete. See EPIC admission navigator for prior to admission medications     Medication history source reliability:Good    Medication history interview source(s):Patient    Medication history resources (including written lists, pill bottles, clinic record):None    Primary pharmacy.[facility], [facility], [place] ([address])    Additional medication history information not noted on PTA med list :None    Time spent in this activity: 30 minutes    Prior to Admission medications    Medication Sig Last Dose Taking? Auth Provider   ASPIRIN EC PO Take 81 mg by mouth every morning Over 1 week ago at am Yes Reported, Patient   Atorvastatin Calcium (LIPITOR PO) Take 40 mg by mouth every morning  3/15/2018 at 0500 Yes Reported, Patient   GABAPENTIN PO Take 1,200 mg by mouth At Bedtime (Patient takes 4 x 300mg capsules= 1,200mg) 2 hours before going to bed. 3/14/2018 at 2000 Yes Reported, Patient   Acetaminophen (TYLENOL PO) Take 650 mg by mouth daily as needed for mild pain  3/15/2018 at 0500 Yes Reported, Patient   METFORMIN HCL PO Take 500 mg by mouth 2 times daily (with meals) 3/15/2018 at 0500 Yes Reported, Patient   order for DME Equipment being ordered: Walker Wheels () and Walker ()  Treatment Diagnosis: Impaired gait   AvilaTommie MD   ASCENSIA CONTOUR TEST VI None Entered   Reported, Patient   ASCENSIA CONTOUR MONITOR None Entered   Reported, Patient

## 2018-03-16 ENCOUNTER — APPOINTMENT (OUTPATIENT)
Dept: PHYSICAL THERAPY | Facility: CLINIC | Age: 63
DRG: 470 | End: 2018-03-16
Attending: ORTHOPAEDIC SURGERY
Payer: COMMERCIAL

## 2018-03-16 ENCOUNTER — APPOINTMENT (OUTPATIENT)
Dept: OCCUPATIONAL THERAPY | Facility: CLINIC | Age: 63
DRG: 470 | End: 2018-03-16
Attending: PHYSICIAN ASSISTANT
Payer: COMMERCIAL

## 2018-03-16 LAB
GLUCOSE SERPL-MCNC: 169 MG/DL (ref 70–99)
HGB BLD-MCNC: 11.5 G/DL (ref 13.3–17.7)

## 2018-03-16 PROCEDURE — 97165 OT EVAL LOW COMPLEX 30 MIN: CPT | Mod: GO

## 2018-03-16 PROCEDURE — 40000193 ZZH STATISTIC PT WARD VISIT: Performed by: PHYSICAL THERAPIST

## 2018-03-16 PROCEDURE — 97110 THERAPEUTIC EXERCISES: CPT | Mod: GP | Performed by: PHYSICAL THERAPIST

## 2018-03-16 PROCEDURE — 82947 ASSAY GLUCOSE BLOOD QUANT: CPT | Performed by: PHYSICIAN ASSISTANT

## 2018-03-16 PROCEDURE — 40000133 ZZH STATISTIC OT WARD VISIT

## 2018-03-16 PROCEDURE — 12000007 ZZH R&B INTERMEDIATE

## 2018-03-16 PROCEDURE — 85018 HEMOGLOBIN: CPT | Performed by: PHYSICIAN ASSISTANT

## 2018-03-16 PROCEDURE — 25000128 H RX IP 250 OP 636: Performed by: PHYSICIAN ASSISTANT

## 2018-03-16 PROCEDURE — 97116 GAIT TRAINING THERAPY: CPT | Mod: GP | Performed by: PHYSICAL THERAPIST

## 2018-03-16 PROCEDURE — 97535 SELF CARE MNGMENT TRAINING: CPT | Mod: GO

## 2018-03-16 PROCEDURE — 25000132 ZZH RX MED GY IP 250 OP 250 PS 637: Performed by: PHYSICIAN ASSISTANT

## 2018-03-16 PROCEDURE — 36415 COLL VENOUS BLD VENIPUNCTURE: CPT | Performed by: PHYSICIAN ASSISTANT

## 2018-03-16 RX ORDER — AMOXICILLIN 250 MG
1 CAPSULE ORAL 2 TIMES DAILY
Qty: 60 TABLET | Refills: 0 | Status: SHIPPED | OUTPATIENT
Start: 2018-03-16 | End: 2021-03-05

## 2018-03-16 RX ORDER — OXYCODONE HYDROCHLORIDE 5 MG/1
5-10 TABLET ORAL
Qty: 18 TABLET | Refills: 0 | Status: SHIPPED | OUTPATIENT
Start: 2018-03-16 | End: 2021-03-05

## 2018-03-16 RX ORDER — ACETAMINOPHEN 325 MG/1
650 TABLET ORAL EVERY 4 HOURS PRN
Qty: 60 TABLET | Refills: 0 | Status: SHIPPED | OUTPATIENT
Start: 2018-03-16 | End: 2022-01-19

## 2018-03-16 RX ADMIN — METFORMIN HYDROCHLORIDE 500 MG: 500 TABLET, FILM COATED ORAL at 18:27

## 2018-03-16 RX ADMIN — GABAPENTIN 1200 MG: 600 TABLET, FILM COATED ORAL at 18:27

## 2018-03-16 RX ADMIN — OXYCODONE HYDROCHLORIDE 10 MG: 5 TABLET ORAL at 15:39

## 2018-03-16 RX ADMIN — OXYCODONE HYDROCHLORIDE 10 MG: 5 TABLET ORAL at 12:20

## 2018-03-16 RX ADMIN — ATORVASTATIN CALCIUM 40 MG: 40 TABLET, FILM COATED ORAL at 09:01

## 2018-03-16 RX ADMIN — ACETAMINOPHEN 975 MG: 325 TABLET, FILM COATED ORAL at 20:10

## 2018-03-16 RX ADMIN — SENNOSIDES AND DOCUSATE SODIUM 2 TABLET: 8.6; 5 TABLET ORAL at 09:01

## 2018-03-16 RX ADMIN — OXYCODONE HYDROCHLORIDE 10 MG: 10 TABLET, FILM COATED, EXTENDED RELEASE ORAL at 12:20

## 2018-03-16 RX ADMIN — ACETAMINOPHEN 975 MG: 325 TABLET, FILM COATED ORAL at 04:05

## 2018-03-16 RX ADMIN — METFORMIN HYDROCHLORIDE 500 MG: 500 TABLET, FILM COATED ORAL at 09:01

## 2018-03-16 RX ADMIN — OXYCODONE HYDROCHLORIDE 10 MG: 5 TABLET ORAL at 04:11

## 2018-03-16 RX ADMIN — ACETAMINOPHEN 975 MG: 325 TABLET, FILM COATED ORAL at 12:20

## 2018-03-16 RX ADMIN — OXYCODONE HYDROCHLORIDE 10 MG: 5 TABLET ORAL at 18:27

## 2018-03-16 RX ADMIN — OXYCODONE HYDROCHLORIDE 10 MG: 5 TABLET ORAL at 21:40

## 2018-03-16 RX ADMIN — SENNOSIDES AND DOCUSATE SODIUM 2 TABLET: 8.6; 5 TABLET ORAL at 20:10

## 2018-03-16 RX ADMIN — ENOXAPARIN SODIUM 40 MG: 40 INJECTION SUBCUTANEOUS at 09:01

## 2018-03-16 RX ADMIN — OXYCODONE HYDROCHLORIDE 10 MG: 5 TABLET ORAL at 09:01

## 2018-03-16 NOTE — PROGRESS NOTES
POD # 1   S/P Left total knee arthroplasty    Patient comfortable.  Pain controlled.  Tolerating oral intake.  No events overnight.     Alert and orient to person, place, and time.  Vital Sign Ranges  Temperature Temp  Av.4  F (36.3  C)  Min: 96.3  F (35.7  C)  Max: 98.4  F (36.9  C)   Blood pressure Systolic (24hrs), Av , Min:83 , Max:129        Diastolic (24hrs), Av, Min:49, Max:80      Pulse Pulse  Av.5  Min: 55  Max: 58   Respirations Resp  Av.1  Min: 13  Max: 22   Pulse oximetry SpO2  Av.2 %  Min: 92 %  Max: 100 %       Left is clean, dry, and intact.  Bilateral calves are soft, non-tender.  Left lower extremity is NVI.    Hgb Pending    A/P  1. S/P Left total knee arthroplasty   Continue Lovenox for DVT prophylaxis.  Finish antibiotics today.  Mobilize with PT/OT WBAT.  Continue current pain regiment.    2. Disposition   Anticipate d/c to home 3/17 or 3/18.    Gita Burch   [phone]    [facility]  [phone]

## 2018-03-16 NOTE — PLAN OF CARE
Problem: Patient Care Overview  Goal: Plan of Care/Patient Progress Review  Discharge Planner PT   Patient plan for discharge: Home with assist from wife and OP PT   Current status: Pt amb 70', 100' and 20' w/FWW and CGA-SBA.  Pt was SBA for sit to stand and sit to supine. Pt reports pain 5/10.  AROM L knee 12-80.   Barriers to return to prior living situation: low activity tolerance and stairs  Recommendations for discharge: Home with assist from wife with stairs and OP PT per PT POC.  Rationale for recommendations: Pt will continue to progress towards functional independence with skilled physical therapy.        Entered by: Nadia Bridges 03/16/2018 1:08 PM

## 2018-03-16 NOTE — PLAN OF CARE
Problem: Patient Care Overview  Goal: Plan of Care/Patient Progress Review  Outcome: No Change   Patient A&Ox4. VSS. Pain controlled with scheduled OxyContin and PRN oxycodone. Dressing cd/i. Baseline neuropathy in lower extremities, otherwise CMS intact.  Knee immobilizer on @ HS.  Voiding adequately. Tolerating regular diet. Will continue to monitor.

## 2018-03-16 NOTE — PROGRESS NOTES
03/16/18 1330   Quick Adds   Type of Visit Initial Occupational Therapy Evaluation   Living Environment   Lives With spouse   Living Arrangements house  (2 story home )   Home Accessibility bed and bath are not on the first floor  (Walk in shower )   Transportation Available car;family or friend will provide   Living Environment Comment Spouse able to assist as needed    Self-Care   Usual Activity Tolerance good   Current Activity Tolerance moderate   Regular Exercise yes   Activity/Exercise Type (TRX and elliptical )   Equipment Currently Used at Home none   Functional Level Prior   Ambulation 0-->independent   Transferring 0-->independent   Toileting 0-->independent   Bathing 0-->independent   Dressing 0-->independent   Fall history within last six months no   Prior Functional Level Comment previous R TKA ~1.5yrs ago   General Information   Onset of Illness/Injury or Date of Surgery - Date 03/15/18   Referring Physician Kiersten    Patient/Family Goals Statement Home   Additional Occupational Profile Info/Pertinent History of Current Problem Per chart: S/P Left total knee arthroplasty   Precautions/Limitations fall precautions   Cognitive Status Examination   Orientation orientation to person, place and time   Level of Consciousness alert   Visual Perception   Visual Perception No deficits were identified   Sensory Examination   Sensory Quick Adds (Nueropathy in feet-baseline )   Pain Assessment   Patient Currently in Pain Yes, see Vital Sign flowsheet   Mobility   Bed Mobility Bed mobility skill: Sit to supine;Bed mobility skill: Supine to sit   Bed Mobility Skill: Sit to Supine   Level of [place]: Sit/Supine stand-by assist   Bed Mobility Skill: Supine to Sit   Level of [place]: Supine/Sit stand-by assist   Transfer Skills   Transfer Transfer Safety Analysis Bed/Chair;Transfer Skill: Stand to Sit;Transfer Safety Analysis Sit/Stand   Transfer Skill: Bed to Chair/Chair to Bed   Level of [place]:  "Bed to Chair contact guard   Transfer Skill: Sit to Stand   Level of [place]: Sit/Stand stand-by assist   Toilet Transfer   Toilet Transfer Toilet Transfer Skill;Toilet Transfer Safety Analysis   Transfer Skill: Toilet Transfer   Level of [place]: Toilet stand-by assist   Lower Body Dressing   Level of [place]: Dress Lower Body stand-by assist   Instrumental Activities of Daily Living (IADL)   Previous Responsibilities housekeeping;yardwork;driving;work  (Works full time )   General Therapy Interventions   Planned Therapy Interventions ADL retraining;IADL retraining;transfer training   Clinical Impression   Criteria for Skilled Therapeutic Interventions Met yes, treatment indicated   OT Diagnosis Decreased ADLs and IADLs, functional transfers   Influenced by the following impairments pain   Assessment of Occupational Performance 1-3 Performance Deficits   Identified Performance Deficits Decreased ADLs and IADLs (dressing, bathing, toileting), functional transfers    Clinical Decision Making (Complexity) Low complexity   Predicted Duration of Therapy Intervention (days/wks) evaluation and one treatment    Anticipated Discharge Disposition Home with Assist   Risks and Benefits of Treatment have been explained. Yes   Patient, Family & other staff in agreement with plan of care Yes   [facility] TM \"6 Clicks\"   2016, Trustees of [facility], under license to [employer].  All rights reserved.   6 Clicks Short Forms Daily Activity Inpatient Short Form   [facility]  \"6 Clicks\" Daily Activity Inpatient Short Form   1. Putting on and taking off regular lower body clothing? 3 - A Little   2. Bathing (including washing, rinsing, drying)? 3 - A Little   3. Toileting, which includes using toilet, bedpan or urinal? 3 - A Little   4. Putting on and taking off regular upper body clothing? 4 - None   5. Taking care of personal grooming such as brushing teeth? 4 - None   6. Eating " meals? 4 - None   Daily Activity Raw Score (Score out of 24.Lower scores equate to lower levels of function) 21   Total Evaluation Time   Total Evaluation Time (Minutes) 8

## 2018-03-16 NOTE — PLAN OF CARE
Problem: Patient Care Overview  Goal: Plan of Care/Patient Progress Review  OT: Evaluation and treatment initiated. Pt lives in a multi level home with his spouse. Prior pt independent in all I/ADLs. Pt is s/p L TKA, previous R TKA ~1.5 yrs ago  Discharge Planner OT   Patient plan for discharge: Home  Current status: Pt ambulated to the bathroom with SBA/CGA and transferred to the toilet with SBA.  While seated/standing pt completed lower body dressing with overall SBA. Educated on options for a raised toilet seat and a shower chair. Educated on compensatory techniques for I/ADLs, pt verbalized understanding.  All IP OT goals met.   Barriers to return to prior living situation: Stairs (defer to PT)  Recommendations for discharge: Home with assist for I/ADLs as needed   Rationale for recommendations: Pt has met Ip OT goal areas. Pt will have assist from spouse as needed.             Occupational Therapy Discharge Summary    Reason for therapy discharge:    All goals and outcomes met, no further needs identified.    Progress towards therapy goal(s). See goals on Care Plan in Psychiatric electronic health record for goal details.  Goals met    Therapy recommendation(s):    Assist as needed for I/ADLs           Entered by: Raf Mcneal 03/16/2018 2:11 PM

## 2018-03-16 NOTE — PLAN OF CARE
Problem: Knee Arthroplasty (Total, Partial) (Adult)  Goal: Signs and Symptoms of Listed Potential Problems Will be Absent, Minimized or Managed (Knee Arthroplasty)  Signs and symptoms of listed potential problems will be absent, minimized or managed by discharge/transition of care (reference Knee Arthroplasty (Total, Partial) (Adult) CPG).   Outcome: Improving  Patient up with assist of 1 and walker.  Ambulated in halls x1 this evening.  Pain managed with Oxycodone.  VSS on RA.  A/Ox4.  Dressing CDI.  Baseline neuropathy in lower extremities, otherwise CMS intact.  Knee immobilizer on @ HS.  Voiding adequately.  Good PO intake.

## 2018-03-16 NOTE — PLAN OF CARE
Problem: Patient Care Overview  Goal: Plan of Care/Patient Progress Review  Discharge Planner PT   Patient plan for discharge: Home with assist from wife  Current status: Pt amb 250' w/FWW and SBA.  3 trials of ascend/descend 1 set of 3 stairs.  1 trial w/ (B) UE and 2 trials with SEC on Right.  Pt tolerated activity well.  Pt reports pain 5/10.  Barriers to return to prior living situation: none  Recommendations for discharge: Home with assist from wife and OP PT per PT POC  Rationale for recommendations: Pt would continue to progress towards functional independence and increase ROM with skilled physical therapy.        Entered by: Nadia Bridges 03/16/2018 3:46 PM

## 2018-03-16 NOTE — PLAN OF CARE
Problem: Knee Arthroplasty (Total, Partial) (Adult)  Goal: Signs and Symptoms of Listed Potential Problems Will be Absent, Minimized or Managed (Knee Arthroplasty)  Signs and symptoms of listed potential problems will be absent, minimized or managed by discharge/transition of care (reference Knee Arthroplasty (Total, Partial) (Adult) CPG).   Outcome: Improving  Pt A/Ox4. VSS. Up 1 assist w/ walker. Reports pain 5/10 using oxycodone with relief. Regular diet tolerated. Voiding adequately. CMS intact except baseline numbness in bilateral LE.

## 2018-03-17 ENCOUNTER — APPOINTMENT (OUTPATIENT)
Dept: PHYSICAL THERAPY | Facility: CLINIC | Age: 63
DRG: 470 | End: 2018-03-17
Attending: ORTHOPAEDIC SURGERY
Payer: COMMERCIAL

## 2018-03-17 VITALS
SYSTOLIC BLOOD PRESSURE: 126 MMHG | HEART RATE: 88 BPM | WEIGHT: 261 LBS | BODY MASS INDEX: 33.5 KG/M2 | OXYGEN SATURATION: 95 % | TEMPERATURE: 99.1 F | DIASTOLIC BLOOD PRESSURE: 68 MMHG | HEIGHT: 74 IN | RESPIRATION RATE: 16 BRPM

## 2018-03-17 LAB — HGB BLD-MCNC: 10.4 G/DL (ref 13.3–17.7)

## 2018-03-17 PROCEDURE — 97110 THERAPEUTIC EXERCISES: CPT | Mod: GP | Performed by: PHYSICAL THERAPIST

## 2018-03-17 PROCEDURE — 85018 HEMOGLOBIN: CPT | Performed by: PHYSICIAN ASSISTANT

## 2018-03-17 PROCEDURE — 25000132 ZZH RX MED GY IP 250 OP 250 PS 637: Performed by: PHYSICIAN ASSISTANT

## 2018-03-17 PROCEDURE — 40000193 ZZH STATISTIC PT WARD VISIT: Performed by: PHYSICAL THERAPIST

## 2018-03-17 PROCEDURE — 97530 THERAPEUTIC ACTIVITIES: CPT | Mod: GP | Performed by: PHYSICAL THERAPIST

## 2018-03-17 PROCEDURE — 97116 GAIT TRAINING THERAPY: CPT | Mod: GP | Performed by: PHYSICAL THERAPIST

## 2018-03-17 PROCEDURE — 25000128 H RX IP 250 OP 636: Performed by: PHYSICIAN ASSISTANT

## 2018-03-17 PROCEDURE — 36415 COLL VENOUS BLD VENIPUNCTURE: CPT | Performed by: PHYSICIAN ASSISTANT

## 2018-03-17 RX ADMIN — OXYCODONE HYDROCHLORIDE 10 MG: 5 TABLET ORAL at 08:10

## 2018-03-17 RX ADMIN — METFORMIN HYDROCHLORIDE 500 MG: 500 TABLET, FILM COATED ORAL at 08:10

## 2018-03-17 RX ADMIN — SENNOSIDES AND DOCUSATE SODIUM 2 TABLET: 8.6; 5 TABLET ORAL at 08:10

## 2018-03-17 RX ADMIN — ENOXAPARIN SODIUM 40 MG: 40 INJECTION SUBCUTANEOUS at 08:11

## 2018-03-17 RX ADMIN — ATORVASTATIN CALCIUM 40 MG: 40 TABLET, FILM COATED ORAL at 08:10

## 2018-03-17 RX ADMIN — OXYCODONE HYDROCHLORIDE 10 MG: 5 TABLET ORAL at 00:47

## 2018-03-17 RX ADMIN — OXYCODONE HYDROCHLORIDE 10 MG: 10 TABLET, FILM COATED, EXTENDED RELEASE ORAL at 00:47

## 2018-03-17 RX ADMIN — ACETAMINOPHEN 975 MG: 325 TABLET, FILM COATED ORAL at 12:53

## 2018-03-17 RX ADMIN — OXYCODONE HYDROCHLORIDE 10 MG: 5 TABLET ORAL at 12:53

## 2018-03-17 NOTE — PLAN OF CARE
Problem: Patient Care Overview  Goal: Plan of Care/Patient Progress Review  Outcome: Improving  Patient is A&O, VSS on RA, CMS intact, regular diet, up with assist of 1. Voiding adequately in the urinal, Oxycodone and OxyContin for pain control, and dressing CDI. Will continue to monitor

## 2018-03-17 NOTE — PROGRESS NOTES
POD # 2  S/P Left TKA    Patient comfortable.  Pain controlled.  Tolerating oral intake.  No events overnight.     Alert and orient to person, place, and time.  Vital Sign Ranges  Temperature Temp  Av.3  F (36.8  C)  Min: 97.7  F (36.5  C)  Max: 98.9  F (37.2  C)   Blood pressure Systolic (24hrs), Av , Min:121 , Max:143        Diastolic (24hrs), Av, Min:62, Max:78      Pulse Pulse  Av  Min: 88  Max: 88   Respirations Resp  Av  Min: 16  Max: 16   Pulse oximetry SpO2  Av %  Min: 91 %  Max: 96 %       Wound  is clean, dry, and intact.  Bilateral calves are soft, non-tender.  Left lower extremity is NVI.        A/P  1. Left TKA   Continue Lovenox for DVT prophylaxis.    Mobilize with PT/OT WBAT.  Continue current pain regiment.    2. Disposition    d/c to Home.    [facility]  [phone]

## 2018-03-17 NOTE — PLAN OF CARE
Problem: Patient Care Overview  Goal: Plan of Care/Patient Progress Review  Discharge Planner PT   Patient plan for discharge: return home today with assist of spouse and OP PT  Current status: Patient needing SBA and safety cues for transfers; independent with bed mobility; CGA to SBA for gait/stairs and safety/sequencing cues; impulsive at times and letting go of equipment; no longer needs to use knee immobilizer for gait/transfers.  Barriers to return to prior living situation: stairs; need for assist for all mobility and ex's; wife able to assist  Recommendations for discharge: home with assist of spouse and OP PT  Rationale for recommendations: decreased L knee ROM/strength; impaired independence with functional mobility       Entered by: Nita Cota 03/17/2018 9:05 AM

## 2018-03-17 NOTE — PLAN OF CARE
Problem: Knee Arthroplasty (Total, Partial) (Adult)  Goal: Signs and Symptoms of Listed Potential Problems Will be Absent, Minimized or Managed (Knee Arthroplasty)  Signs and symptoms of listed potential problems will be absent, minimized or managed by discharge/transition of care (reference Knee Arthroplasty (Total, Partial) (Adult) CPG).   Outcome: Completed Date Met: 03/17/18  Pt A/Ox4. VSS. Up 1 assist w/ walker. Reports pain 4/10 using oxycodone with relief. Regular diet tolerated. CMS intact. Dressing changed, c/d/i. Voiding adequately. D/c to home with wife at 1500 via wheelchair. AVS and cost of medication reviewed with pt at d/c. Denies pain at d/c.

## 2018-03-17 NOTE — PLAN OF CARE
Problem: Patient Care Overview  Goal: Plan of Care/Patient Progress Review  Physical Therapy Discharge Summary    Reason for therapy discharge:    Discharged to home with outpatient therapy.    Progress towards therapy goal(s). See goals on Care Plan in [facility] electronic health record for goal details.  Goals partially met.  Barriers to achieving goals:   discharge from facility.    Therapy recommendation(s):    Continued therapy is recommended.  Rationale/Recommendations:  assist from spouse for all mobility; OP PT to maximize knee ROM/strength.

## 2018-03-17 NOTE — DISCHARGE SUMMARY
Discharge Summary    Damien Dacosta Jr. MRN# [id number]   YOB: 1955 Age: 62 year old     Date of Admission:  3/15/2018  Date of Discharge:  3/17/2018  Admitting Physician:  Tommie Gramajo MD  Discharge Physician:  Tommie Gramajo MD     Primary Provider: Brianda Lewis          Admission Diagnoses:   Osteoarthritis left knee          Discharge Diagnosis:   Same           Surgical Procedure:   left knee replacement           Secondary Diagnosis:     Patient Active Problem List   Diagnosis     Numbness and tingling in left arm     Musculoskeletal disorder involving upper trapezius muscle     Right knee pain     Left knee pain     S/P total knee replacement using cement, right     Acute pain of right knee     Knee effusion, right     S/P total knee replacement using cement, left              Discharge Disposition:   Discharged to home           Medications Prior to Admission:     Prescriptions Prior to Admission   Medication Sig Dispense Refill Last Dose     Atorvastatin Calcium (LIPITOR PO) Take 40 mg by mouth every morning    3/15/2018 at 0500     GABAPENTIN PO Take 1,200 mg by mouth At Bedtime (Patient takes 4 x 300mg capsules= 1,200mg) 2 hours before going to bed.   3/14/2018 at 2000     METFORMIN HCL PO Take 500 mg by mouth 2 times daily (with meals)   3/15/2018 at 0500     [DISCONTINUED] ASPIRIN EC PO Take 81 mg by mouth every morning   Over 1 week ago at am     order for DME Equipment being ordered: Walker Wheels () and Walker ()  Treatment Diagnosis: Impaired gait 1 each 0      ASCENSIA CONTOUR TEST VI None Entered        ASCENSIA CONTOUR MONITOR None Entered                Discharge Medications:     Current Discharge Medication List      START taking these medications    Details   oxyCODONE IR (ROXICODONE) 5 MG tablet Take 1-2 tablets (5-10 mg) by mouth every 3 hours as needed for other (pain control or improvement in physical function. Hold dose for analgesic side  effects.)  Qty: 18 tablet, Refills: 0    Associated Diagnoses: S/P total knee replacement using cement, left      enoxaparin (LOVENOX) 40 MG/0.4ML injection Inject 0.4 mLs (40 mg) Subcutaneous every 24 hours for 12 days  Qty: 4.8 mL, Refills: 0    Associated Diagnoses: S/P total knee replacement using cement, left      senna-docusate (SENOKOT-S;PERICOLACE) 8.6-50 MG per tablet Take 1 tablet by mouth 2 times daily  Qty: 60 tablet, Refills: 0    Associated Diagnoses: S/P total knee replacement using cement, left      !! order for DME Equipment being ordered: Cane ()  Treatment Diagnosis: Impaired gait  Qty: 1 each, Refills: 0    Associated Diagnoses: S/P total knee replacement using cement, left       !! - Potential duplicate medications found. Please discuss with provider.      CONTINUE these medications which have CHANGED    Details   acetaminophen (TYLENOL) 325 MG tablet Take 2 tablets (650 mg) by mouth every 4 hours as needed for mild pain  Qty: 60 tablet, Refills: 0    Associated Diagnoses: S/P total knee replacement using cement, left         CONTINUE these medications which have NOT CHANGED    Details   Atorvastatin Calcium (LIPITOR PO) Take 40 mg by mouth every morning       GABAPENTIN PO Take 1,200 mg by mouth At Bedtime (Patient takes 4 x 300mg capsules= 1,200mg) 2 hours before going to bed.      METFORMIN HCL PO Take 500 mg by mouth 2 times daily (with meals)      !! order for DME Equipment being ordered: Walker Wheels () and Walker ()  Treatment Diagnosis: Impaired gait  Qty: 1 each, Refills: 0    Associated Diagnoses: S/P total knee replacement using cement, right      ASCENSIA CONTOUR TEST VI None Entered      ASCENSIA CONTOUR MONITOR None Entered       !! - Potential duplicate medications found. Please discuss with provider.      STOP taking these medications       ASPIRIN EC PO Comments:   Reason for Stopping:                     Consultations:   No consultations were requested during  this admission           Hospital Course:   The patient was admitted after the surical procedure. The patient underwent an uneventfulleft knee replacement. Postoperatively, anticoagulation  with Lovenox was started. No transfusion was required. The patient will be discharged to home. Home medications have been reconciled. oxycodone 5 mg. was prescribed for pain. Lovenox  will be prescribed.             Pending Results:   None           Discharge Instructions and Follow-Up:        Discharge activity: Activity as tolerated   Discharge follow-up: Follow up with me in 14 days   Outpatient therapy: scheduled   Home Care agency: None    Supplies and equipment: None        Wound care: dry dressing daily and as needed   Other instructions: None

## 2020-01-09 ENCOUNTER — TRANSFERRED RECORDS (OUTPATIENT)
Dept: HEALTH INFORMATION MANAGEMENT | Facility: CLINIC | Age: 65
End: 2020-01-09

## 2020-03-16 LAB
CHOLESTEROL (EXTERNAL): 154 MG/DL (ref 100–199)
HDLC SERPL-MCNC: 50 MG/DL
LDL CHOLESTEROL CALCULATED (EXTERNAL): 80 MG/DL
NON HDL CHOLESTEROL (EXTERNAL): 104 MG/DL
TRIGLYCERIDES (EXTERNAL): 118 MG/DL

## 2020-07-20 ENCOUNTER — TRANSFERRED RECORDS (OUTPATIENT)
Dept: HEALTH INFORMATION MANAGEMENT | Facility: CLINIC | Age: 65
End: 2020-07-20

## 2020-11-11 NOTE — TELEPHONE ENCOUNTER
RECORDS RECEIVED FROM: Self   Date of Appt: 3/5/21   NOTES (FOR ALL VISITS) STATUS DETAILS   OFFICE NOTE from referring provider N/A      OFFICE NOTE from other specialist Received David RODRIGUEZ @ [facility]:  1/9/20   DISCHARGE SUMMARY from hospital N/A    DISCHARGE REPORT from the ER N/A    OPERATIVE REPORT N/A    MEDICATION LIST Care Everywhere    IMAGING  (FOR ALL VISITS)     EMG N/A    EEG N/A    LUMBAR PUNCTURE N/A    DIANE SCAN N/A    DEXA SCAN *NEUROSURGERY* N/A    ULTRASOUND (CAROTID BILAT) *VASCULAR* N/A    MRI (HEAD, NECK, SPINE) N/A    XRAY (SPINE) *NEUROSURGERY* N/A    CT (HEAD, NECK, SPINE) N/A       Action 11/11/20 MV 11.52am   Action Taken Records request faxed to [facility]    Records request faxed to [facility]     Action 12/1/20 MV 1.24pm   Action Taken Records received from [facility] and scanned in chart.    2nd request faxed to [facility]     Action 12/2/20 MV 11.02am   Action Taken Received fax from [facility] - patient is not a patient at [facility].

## 2020-11-30 LAB — TSH SERPL-ACNC: 1.8 UIU/ML (ref 0.35–4.94)

## 2021-02-03 ENCOUNTER — DOCUMENTATION ONLY (OUTPATIENT)
Dept: CARE COORDINATION | Facility: CLINIC | Age: 66
End: 2021-02-03

## 2021-02-19 PROBLEM — N40.1 BENIGN PROSTATIC HYPERPLASIA WITH URINARY FREQUENCY: Status: ACTIVE | Noted: 2020-07-30

## 2021-02-19 PROBLEM — R35.0 BENIGN PROSTATIC HYPERPLASIA WITH URINARY FREQUENCY: Status: ACTIVE | Noted: 2020-07-30

## 2021-02-19 RX ORDER — ATORVASTATIN CALCIUM 40 MG/1
40 TABLET, FILM COATED ORAL EVERY EVENING
COMMUNITY
Start: 2019-12-13 | End: 2021-03-05

## 2021-02-19 RX ORDER — GABAPENTIN 300 MG/1
CAPSULE ORAL
COMMUNITY
Start: 2021-02-19 | End: 2021-03-05

## 2021-02-19 RX ORDER — GABAPENTIN 300 MG/1
1200 CAPSULE ORAL AT BEDTIME
COMMUNITY
Start: 2021-02-19 | End: 2021-02-19

## 2021-02-19 RX ORDER — ASPIRIN 325 MG
TABLET ORAL
COMMUNITY
Start: 2009-07-16 | End: 2021-02-19

## 2021-02-19 RX ORDER — NICOTINE POLACRILEX 4 MG/1
20 GUM, CHEWING ORAL DAILY
COMMUNITY
Start: 2009-07-16 | End: 2021-03-05

## 2021-02-19 RX ORDER — AMOXICILLIN 500 MG/1
TABLET, FILM COATED ORAL
COMMUNITY
Start: 2020-03-16

## 2021-02-19 RX ORDER — TADALAFIL 5 MG/1
5 TABLET ORAL
COMMUNITY
Start: 2020-08-14 | End: 2021-03-05

## 2021-02-19 RX ORDER — PRAVASTATIN SODIUM 20 MG
TABLET ORAL
COMMUNITY
Start: 2011-03-29 | End: 2021-03-05

## 2021-02-19 RX ORDER — GABAPENTIN 300 MG/1
CAPSULE ORAL
COMMUNITY
Start: 2016-03-02 | End: 2021-03-05

## 2021-02-19 RX ORDER — PROPRANOLOL HYDROCHLORIDE 20 MG/1
TABLET ORAL
COMMUNITY
Start: 2020-12-01 | End: 2021-03-05

## 2021-02-19 RX ORDER — GABAPENTIN 300 MG/1
CAPSULE ORAL
COMMUNITY
Start: 2020-07-20 | End: 2021-02-19

## 2021-02-19 RX ORDER — ASPIRIN 325 MG
325 TABLET ORAL DAILY
COMMUNITY
Start: 2021-02-19 | End: 2021-03-05

## 2021-02-19 NOTE — PROGRESS NOTES
Diagnosis/Summary/Recommendations:    PATIENT: Damien Dacosta Jr.  65 year old male     : 1955    VALERIANO: 2021    [id number]   [place] [facility] [place] [address]   [email or web address]  [phone] (M)   [phone] (H)   [phone] (W)   María Dacosta  [id number]      Assessment:    (G25.9) Movement disorder  (primary encounter diagnosis)    Neuropathy  Gabapentin neurontin 300mg x4 at night.   For neuropathy  May have some creepy crawling leg feelings   The gabapentin is helpful for his neuropathy   If he forgets to take it he cannot fall asleep.    Tremor - diagnoses of essential tremor.   Propranolol inderal 20mg twice daily   Started on propranolol in 2020  Tremor duration 3 years  Right handed  Bilateral hand tremor  Handwriting is affected. shakey  Utensils - difficulties with fork   May need two hands to hold a cup  He has not dropped things but has significant shaking.   He has a bit of head tremor.   Alleviating factors - no clear - not sure if alcohol helps.   Tremor is better in the morning and increased after working out.   Has had problems with putting - short puts.     Examination - he has a no no head tremor with bilateral postural and action tremor - worse on reaching. He has unsteadiness of gait and has problems with tandem and seemingly goes off to the right at times. Suspect his neuropathy impacting this part of his balance and gait.     Gait/Balance/Falls  - no falls     Exercise/Therapy  - he is rowing and TRX and when warmer he golfs    Cognitive/Driving  - denies     Mood  - denies    Vaccinated for 1st time - maderna vaccination    Hallucinations/delusions  =- denies    Sleep  - able to fall asleep relatively quickly within 15-20 minutes and sleeps through the night.   He gets up to urinate once nightly  Snores a bit  No loss of smell  No talking in his sleep.     Bladder  - he has been diagnosed with bph - shaved the  prostate    GI/Constipation/GERD  Omeprazole prilosec 20mg  Senna-docusate senokot-S 8.6-50    END  Atorvastatin lipitor 40mg in am   Metformin glucophage 500mg  In am and 2 in pmm. Has 5.8 a1c and 130s   Has a bit of neuropathy. No renal or eye problems  Has some involvement in the bottom of his feet.     Cardio/heart - typically okay     Vision  - denies     Heme  Aspirin 325mg - not taking    Other:  Acetaminophen tylenol 325mg as needed   Oxycodone IR roxicodone 5mg - not taking     Amoxicillin amoxil 500mg prior to dental    Medications     8am  8pm     Acetaminophen tylenol 325mg As needed       Amoxicillin amoxil 500mg  Prior to dental work       Aspirin 325mg Not taking       Atorvastatin lipitor 40mg  1       Gabapentin neurontin 300mg Not using       Gabapentin neurontin 300mg   4     Metformin glucophage 500mg 1  2     MVI 1       Omeprazole prilosec 20mg Not taking       Oxycodone IR roxicodone 5mg As needed       Pravastatin pravachol 20mg Not taking       Propranolol inderal 20mg  1  1     Senna-docusate senokot-S 8.6-50 1  1     Tadafil cialis 5mg                            Plan:    Discussed medication, surgical and occupational therapy/device approaches in the management of his tremor.    He is on propranolol and his heart rate is 50 and he has not noticed much benefit. He may want to stop the daily use of propranolol due to its limited benefit and use it as needed. I am not sure we can increase the dose of this medication further given his low heart rate. He had been seen by a neurologist in the past: Dr. Gabriella Elena     Discussed topiramate/topamax and primidone/mysoline and discussed having Joaquina Melgar's input from pharmacy    Discussed occupational therapy - and made referral    Discussed deep brain stimulation (DBS)    Reviewed his history and updated chart and he has granted proxy access to his wife to his chart.     Return to be determined.       Coding statement:   Medical Decision  Making:  #  Chronic progressive medical conditions addressed  yes  Review and/or interpretation of unique test or documentation from a provider outside of neurology yes   Independent historian provided additional details  no  Prescription drug management and review of potential side effects and/or monitoring for side effects  yes   Health impacted by social determinants of health  no    I have reviewed the note as documented above.  This accurately captures the substance of my conversation with the patient and total time spent preparing for visit, executing visit and completing visit on the day of the visit:  60 minutes.      Jonathan Delgado MD     ______________________________________    Last visit date and details:             ______________________________________      Patient was asked about 14 Review of systems including changes in vision (dry eyes, double vision), hearing, heart, lungs, musculoskeletal, depression, anxiety, snoring, RBD, insomnia, urinary frequency, urinary urgency, constipation, swallowing problems, hematological, ID, allergies, skin problems: seborrhea, endocrinological: thyroid, diabetes, cholesterol; balance, weight changes, and other neurological problems and these were not significant at this time except for   Patient Active Problem List   Diagnosis     Numbness and tingling in left arm     Musculoskeletal disorder involving upper trapezius muscle     Right knee pain     Left knee pain     S/P total knee replacement using cement, right     Acute pain of right knee     Knee effusion, right     S/P total knee replacement using cement, left     Benign prostatic hyperplasia with urinary frequency     Family history of cardiovascular disease     Routine general medical examination at a health care facility     Type 2 diabetes, controlled, with peripheral neuropathy (H)          Allergies   Allergen Reactions     Shellfish Allergy Anaphylaxis     Shellfish-Derived Products Anaphylaxis     Throat  closing  Other reaction(s): Throat Irritation     Past Surgical History:   Procedure Laterality Date     ARTHROPLASTY KNEE Right 9/20/2016    Procedure: ARTHROPLASTY KNEE;  Surgeon: Tommie Gramajo MD;  Location:  OR     ARTHROPLASTY KNEE Left 3/15/2018    Procedure: ARTHROPLASTY KNEE;  LEFT TOTAL KNEE ARTHROPLASTY;  Surgeon: Tommie Gramajo MD;  Location:  OR     ENT SURGERY      tonsillectomy     GENITOURINARY SURGERY      laser TURP     ORTHOPEDIC SURGERY Bilateral     knee arthroscopy     Past Medical History:   Diagnosis Date     Arthritis     osteoarthrosis of lower keg     Diabetes (H)     type 2     Hyperlipidemia      Social History     Socioeconomic History     Marital status:      Spouse name: Not on file     Number of children: Not on file     Years of education: Not on file     Highest education level: Not on file   Occupational History     Not on file   Social Needs     Financial resource strain: Not on file     Food insecurity     Worry: Not on file     Inability: Not on file     Transportation needs     Medical: Not on file     Non-medical: Not on file   Tobacco Use     Smoking status: Former Smoker     Types: Cigars, Dip, chew, snus or snuff     Smokeless tobacco: Current User     Types: Chew     Tobacco comment: quit cigs 20 years ago   Substance and Sexual Activity     Alcohol use: Yes     Comment: 2-4/ week     Drug use: No     Sexual activity: Not on file   Lifestyle     Physical activity     Days per week: Not on file     Minutes per session: Not on file     Stress: Not on file   Relationships     Social connections     Talks on phone: Not on file     Gets together: Not on file     Attends [employer] service: Not on file     Active member of club or organization: Not on file     Attends meetings of clubs or organizations: Not on file     Relationship status: Not on file     Intimate partner violence     Fear of current or ex partner: Not on file     Emotionally abused: Not on  file     Physically abused: Not on file     Forced sexual activity: Not on file   Other Topics Concern     Parent/sibling w/ CABG, MI or angioplasty before 65F 55M? Not Asked   Social History Narrative     Not on file       Drug and lactation database from the [place] National Library of Medicine:  http://toxnet.nlm.nih.gov/cgi-bin/sis/htmlgen?LACT      B/P: Data Unavailable, T: Data Unavailable, P: Data Unavailable, R: Data Unavailable 0 lbs 0 oz  There were no vitals taken for this visit., There is no height or weight on file to calculate BMI.  Medications and Vitals not listed above were documented in the cart and reviewed by me.     Current Outpatient Medications   Medication Sig Dispense Refill     amoxicillin (AMOXIL) 500 MG tablet Take 4 tabs oral 1 hour before dental work       aspirin (ASA) 325 MG tablet        atorvastatin (LIPITOR) 40 MG tablet Take 40 mg by mouth every evening       gabapentin (NEURONTIN) 300 MG capsule TK 1 C IN THE MORNING AND 3 CS IN THE EVENING       metFORMIN (GLUCOPHAGE) 500 MG tablet TAKE 1 TABLET EVERY MORNING AND 2 TABLETS AT BEDTIME       MULTIPLE VITAMINS-MINERALS PO        omeprazole ([facility] OMEPRAZOLE) 20 MG tablet        pravastatin (PRAVACHOL) 20 MG tablet 1/2 of 20mg tab by mouth daily       tadalafil (CIALIS) 5 MG tablet Take 5 mg by mouth       acetaminophen (TYLENOL) 325 MG tablet Take 2 tablets (650 mg) by mouth every 4 hours as needed for mild pain 60 tablet 0     ASCENSIA CONTOUR MONITOR None Entered       ASCENSIA CONTOUR TEST VI None Entered       Atorvastatin Calcium (LIPITOR PO) Take 40 mg by mouth every morning        gabapentin (NEURONTIN) 300 MG capsule        GABAPENTIN PO Take 1,200 mg by mouth At Bedtime (Patient takes 4 x 300mg capsules= 1,200mg) 2 hours before going to bed.       METFORMIN HCL PO Take 500 mg by mouth 2 times daily (with meals)       order for DME Equipment being ordered: Crutches ()  Treatment Diagnosis: impaired gait stability 1  each 0     order for DME Equipment being ordered: Cane ()  Treatment Diagnosis: Impaired gait 1 each 0     order for DME Equipment being ordered: Walker Wheels () and Walker ()  Treatment Diagnosis: Impaired gait 1 each 0     oxyCODONE IR (ROXICODONE) 5 MG tablet Take 1-2 tablets (5-10 mg) by mouth every 3 hours as needed for other (pain control or improvement in physical function. Hold dose for analgesic side effects.) 18 tablet 0     propranolol (INDERAL) 20 MG tablet TK 1 T PO D FOR 7 DAYS THEN 1 T PO BID       senna-docusate (SENOKOT-S;PERICOLACE) 8.6-50 MG per tablet Take 1 tablet by mouth 2 times daily 60 tablet 0                 Jonathan Delgado MD

## 2021-03-05 ENCOUNTER — OFFICE VISIT (OUTPATIENT)
Dept: NEUROLOGY | Facility: CLINIC | Age: 66
End: 2021-03-05
Payer: COMMERCIAL

## 2021-03-05 ENCOUNTER — PRE VISIT (OUTPATIENT)
Dept: NEUROLOGY | Facility: CLINIC | Age: 66
End: 2021-03-05

## 2021-03-05 VITALS
WEIGHT: 240 LBS | BODY MASS INDEX: 30.81 KG/M2 | HEART RATE: 50 BPM | RESPIRATION RATE: 16 BRPM | DIASTOLIC BLOOD PRESSURE: 101 MMHG | OXYGEN SATURATION: 100 % | SYSTOLIC BLOOD PRESSURE: 157 MMHG

## 2021-03-05 DIAGNOSIS — G25.9 MOVEMENT DISORDER: Primary | ICD-10-CM

## 2021-03-05 DIAGNOSIS — R25.1 TREMOR: ICD-10-CM

## 2021-03-05 PROCEDURE — 99205 OFFICE O/P NEW HI 60 MIN: CPT | Performed by: PSYCHIATRY & NEUROLOGY

## 2021-03-05 RX ORDER — TADALAFIL 5 MG/1
5 TABLET ORAL DAILY
COMMUNITY
Start: 2021-03-05

## 2021-03-05 RX ORDER — GABAPENTIN 300 MG/1
CAPSULE ORAL
COMMUNITY
Start: 2021-03-05 | End: 2021-04-05

## 2021-03-05 RX ORDER — MULTIVITAMIN
1 TABLET ORAL DAILY
COMMUNITY
Start: 2021-03-05

## 2021-03-05 RX ORDER — PROPRANOLOL HYDROCHLORIDE 20 MG/1
TABLET ORAL
COMMUNITY
Start: 2021-03-05 | End: 2021-03-15

## 2021-03-05 RX ORDER — ATORVASTATIN CALCIUM 40 MG/1
40 TABLET, FILM COATED ORAL EVERY MORNING
COMMUNITY
Start: 2021-03-05

## 2021-03-05 ASSESSMENT — PAIN SCALES - GENERAL: PAINLEVEL: NO PAIN (0)

## 2021-03-05 NOTE — PATIENT INSTRUCTIONS
Discussed medication, surgical and occupational therapy/device approaches in the management of his tremor.    He is on propranolol and his heart rate is 50 and he has not noticed much benefit. He may want to stop the daily use of propranolol due to its limited benefit and use it as needed. I am not sure we can increase the dose of this medication further given his low heart rate. He had been seen by a neurologist in the past: Dr. Gabriella Elena     Discussed topiramate/topamax and primidone/mysoline and discussed having Joaquina Melgar's input from pharmacy    Discussed occupational therapy - and made referral    Discussed deep brain stimulation (DBS)    Reviewed his history and updated chart and he has granted proxy access to his wife to his chart.     Return to be determined.

## 2021-03-05 NOTE — LETTER
3/5/2021       RE: Damien Dacosta Jr.  [address]  [place] [address]     Dear Colleague,    Thank you for referring your patient, Damien Dacosta Jr., to the [facility] [place] [facility]. Please see a copy of my visit note below.        Diagnosis/Summary/Recommendations:    PATIENT: Damien Dacosta Jr.  65 year old male     : 1955    VALERIANO: 2021    [address]   [place] [address]   [email or web address]  [phone] (M)   [phone] (H)   [phone] (W)   María Dacosta  [id number]      Assessment:    (G25.9) Movement disorder  (primary encounter diagnosis)    Neuropathy  Gabapentin neurontin 300mg x4 at night.   For neuropathy  May have some creepy crawling leg feelings   The gabapentin is helpful for his neuropathy   If he forgets to take it he cannot fall asleep.    Tremor - diagnoses of essential tremor.   Propranolol inderal 20mg twice daily   Started on propranolol in 2020  Tremor duration 3 years  Right handed  Bilateral hand tremor  Handwriting is affected. shakey  Utensils - difficulties with fork   May need two hands to hold a cup  He has not dropped things but has significant shaking.   He has a bit of head tremor.   Alleviating factors - no clear - not sure if alcohol helps.   Tremor is better in the morning and increased after working out.   Has had problems with putting - short puts.     Examination - he has a no no head tremor with bilateral postural and action tremor - worse on reaching. He has unsteadiness of gait and has problems with tandem and seemingly goes off to the right at times. Suspect his neuropathy impacting this part of his balance and gait.     Gait/Balance/Falls  - no falls     Exercise/Therapy  - he is rowing and TRX and when warmer he golfs    Cognitive/Driving  - denies     Mood  - denies    Vaccinated for 1st time - maderna  vaccination    Hallucinations/delusions  =- denies    Sleep  - able to fall asleep relatively quickly within 15-20 minutes and sleeps through the night.   He gets up to urinate once nightly  Snores a bit  No loss of smell  No talking in his sleep.     Bladder  - he has been diagnosed with bph - shaved the prostate    GI/Constipation/GERD  Omeprazole prilosec 20mg  Senna-docusate senokot-S 8.6-50    END  Atorvastatin lipitor 40mg in am   Metformin glucophage 500mg  In am and 2 in pmm. Has 5.8 a1c and 130s   Has a bit of neuropathy. No renal or eye problems  Has some involvement in the bottom of his feet.     Cardio/heart - typically okay     Vision  - denies     Heme  Aspirin 325mg - not taking    Other:  Acetaminophen tylenol 325mg as needed   Oxycodone IR roxicodone 5mg - not taking     Amoxicillin amoxil 500mg prior to dental    Medications     8am  8pm     Acetaminophen tylenol 325mg As needed       Amoxicillin amoxil 500mg  Prior to dental work       Aspirin 325mg Not taking       Atorvastatin lipitor 40mg  1       Gabapentin neurontin 300mg Not using       Gabapentin neurontin 300mg   4     Metformin glucophage 500mg 1  2     MVI 1       Omeprazole prilosec 20mg Not taking       Oxycodone IR roxicodone 5mg As needed       Pravastatin pravachol 20mg Not taking       Propranolol inderal 20mg  1  1     Senna-docusate senokot-S 8.6-50 1  1     Tadafil cialis 5mg                            Plan:    Discussed medication, surgical and occupational therapy/device approaches in the management of his tremor.    He is on propranolol and his heart rate is 50 and he has not noticed much benefit. He may want to stop the daily use of propranolol due to its limited benefit and use it as needed. I am not sure we can increase the dose of this medication further given his low heart rate. He had been seen by a neurologist in the past: Dr. Gabriella Elena     Discussed topiramate/topamax and primidone/mysoline and discussed having  Joaquina Melgar's input from pharmacy    Discussed occupational therapy - and made referral    Discussed deep brain stimulation (DBS)    Reviewed his history and updated chart and he has granted proxy access to his wife to his chart.     Return to be determined.       Coding statement:   Medical Decision Making:  #  Chronic progressive medical conditions addressed  yes  Review and/or interpretation of unique test or documentation from a provider outside of neurology yes   Independent historian provided additional details  no  Prescription drug management and review of potential side effects and/or monitoring for side effects  yes   Health impacted by social determinants of health  no    I have reviewed the note as documented above.  This accurately captures the substance of my conversation with the patient and total time spent preparing for visit, executing visit and completing visit on the day of the visit:  60 minutes.      Jonathan Delgado MD     ______________________________________    Last visit date and details:             ______________________________________      Patient was asked about 14 Review of systems including changes in vision (dry eyes, double vision), hearing, heart, lungs, musculoskeletal, depression, anxiety, snoring, RBD, insomnia, urinary frequency, urinary urgency, constipation, swallowing problems, hematological, ID, allergies, skin problems: seborrhea, endocrinological: thyroid, diabetes, cholesterol; balance, weight changes, and other neurological problems and these were not significant at this time except for   Patient Active Problem List   Diagnosis     Numbness and tingling in left arm     Musculoskeletal disorder involving upper trapezius muscle     Right knee pain     Left knee pain     S/P total knee replacement using cement, right     Acute pain of right knee     Knee effusion, right     S/P total knee replacement using cement, left     Benign prostatic hyperplasia with urinary frequency      Family history of cardiovascular disease     Routine general medical examination at a health care facility     Type 2 diabetes, controlled, with peripheral neuropathy (H)          Allergies   Allergen Reactions     Shellfish Allergy Anaphylaxis     Shellfish-Derived Products Anaphylaxis     Throat closing  Other reaction(s): Throat Irritation     Past Surgical History:   Procedure Laterality Date     ARTHROPLASTY KNEE Right 9/20/2016    Procedure: ARTHROPLASTY KNEE;  Surgeon: Tommie Gramajo MD;  Location:  OR     ARTHROPLASTY KNEE Left 3/15/2018    Procedure: ARTHROPLASTY KNEE;  LEFT TOTAL KNEE ARTHROPLASTY;  Surgeon: Tommie Gramajo MD;  Location:  OR     ENT SURGERY      tonsillectomy     GENITOURINARY SURGERY      laser TURP     ORTHOPEDIC SURGERY Bilateral     knee arthroscopy     Past Medical History:   Diagnosis Date     Arthritis     osteoarthrosis of lower keg     Diabetes (H)     type 2     Hyperlipidemia      Social History     Socioeconomic History     Marital status:      Spouse name: Not on file     Number of children: Not on file     Years of education: Not on file     Highest education level: Not on file   Occupational History     Not on file   Social Needs     Financial resource strain: Not on file     Food insecurity     Worry: Not on file     Inability: Not on file     Transportation needs     Medical: Not on file     Non-medical: Not on file   Tobacco Use     Smoking status: Former Smoker     Types: Cigars, Dip, chew, snus or snuff     Smokeless tobacco: Current User     Types: Chew     Tobacco comment: quit cigs 20 years ago   Substance and Sexual Activity     Alcohol use: Yes     Comment: 2-4/ week     Drug use: No     Sexual activity: Not on file   Lifestyle     Physical activity     Days per week: Not on file     Minutes per session: Not on file     Stress: Not on file   Relationships     Social connections     Talks on phone: Not on file     Gets together: Not on file      Attends [employer] service: Not on file     Active member of club or organization: Not on file     Attends meetings of clubs or organizations: Not on file     Relationship status: Not on file     Intimate partner violence     Fear of current or ex partner: Not on file     Emotionally abused: Not on file     Physically abused: Not on file     Forced sexual activity: Not on file   Other Topics Concern     Parent/sibling w/ CABG, MI or angioplasty before 65F 55M? Not Asked   Social History Narrative     Not on file       Drug and lactation database from the [place] National Library of Medicine:  http://toxnet.nlm.nih.gov/cgi-bin/sis/htmlgen?LACT      B/P: Data Unavailable, T: Data Unavailable, P: Data Unavailable, R: Data Unavailable 0 lbs 0 oz  There were no vitals taken for this visit., There is no height or weight on file to calculate BMI.  Medications and Vitals not listed above were documented in the cart and reviewed by me.     Current Outpatient Medications   Medication Sig Dispense Refill     amoxicillin (AMOXIL) 500 MG tablet Take 4 tabs oral 1 hour before dental work       aspirin (ASA) 325 MG tablet        atorvastatin (LIPITOR) 40 MG tablet Take 40 mg by mouth every evening       gabapentin (NEURONTIN) 300 MG capsule TK 1 C IN THE MORNING AND 3 CS IN THE EVENING       metFORMIN (GLUCOPHAGE) 500 MG tablet TAKE 1 TABLET EVERY MORNING AND 2 TABLETS AT BEDTIME       MULTIPLE VITAMINS-MINERALS PO        omeprazole ([employer] OMEPRAZOLE) 20 MG tablet        pravastatin (PRAVACHOL) 20 MG tablet 1/2 of 20mg tab by mouth daily       tadalafil (CIALIS) 5 MG tablet Take 5 mg by mouth       acetaminophen (TYLENOL) 325 MG tablet Take 2 tablets (650 mg) by mouth every 4 hours as needed for mild pain 60 tablet 0     ASCENSIA CONTOUR MONITOR None Entered       ASCENSIA CONTOUR TEST VI None Entered       Atorvastatin Calcium (LIPITOR PO) Take 40 mg by mouth every morning        gabapentin (NEURONTIN) 300 MG capsule         GABAPENTIN PO Take 1,200 mg by mouth At Bedtime (Patient takes 4 x 300mg capsules= 1,200mg) 2 hours before going to bed.       METFORMIN HCL PO Take 500 mg by mouth 2 times daily (with meals)       order for DME Equipment being ordered: Crutches ()  Treatment Diagnosis: impaired gait stability 1 each 0     order for DME Equipment being ordered: Cane ()  Treatment Diagnosis: Impaired gait 1 each 0     order for DME Equipment being ordered: Walker Wheels () and Walker ()  Treatment Diagnosis: Impaired gait 1 each 0     oxyCODONE IR (ROXICODONE) 5 MG tablet Take 1-2 tablets (5-10 mg) by mouth every 3 hours as needed for other (pain control or improvement in physical function. Hold dose for analgesic side effects.) 18 tablet 0     propranolol (INDERAL) 20 MG tablet TK 1 T PO D FOR 7 DAYS THEN 1 T PO BID       senna-docusate (SENOKOT-S;PERICOLACE) 8.6-50 MG per tablet Take 1 tablet by mouth 2 times daily 60 tablet 0                 Jonathan Delgado MD

## 2021-03-05 NOTE — NURSING NOTE
Chief Complaint   Patient presents with     Consult     [facility] NEW MOVEMENT DISORDER - familial tremor     Juancarlos Bustamante

## 2021-03-11 ENCOUNTER — HOSPITAL ENCOUNTER (OUTPATIENT)
Dept: OCCUPATIONAL THERAPY | Facility: CLINIC | Age: 66
Setting detail: THERAPIES SERIES
End: 2021-03-11
Attending: PSYCHIATRY & NEUROLOGY
Payer: COMMERCIAL

## 2021-03-11 DIAGNOSIS — R25.1 TREMOR: ICD-10-CM

## 2021-03-11 PROCEDURE — 97165 OT EVAL LOW COMPLEX 30 MIN: CPT | Mod: GO | Performed by: OCCUPATIONAL THERAPIST

## 2021-03-11 PROCEDURE — 97535 SELF CARE MNGMENT TRAINING: CPT | Mod: GO | Performed by: OCCUPATIONAL THERAPIST

## 2021-03-11 PROCEDURE — 97112 NEUROMUSCULAR REEDUCATION: CPT | Mod: GO | Performed by: OCCUPATIONAL THERAPIST

## 2021-03-12 NOTE — PROGRESS NOTES
03/11/21 1200   Quick Adds   Type of Visit Initial Outpatient Occupational Therapy Evaluation   General Information   Start Of Care Date 03/11/21   Referring Physician Dr. Delgado   Orders Evaluate and treat as indicated   Orders Date 03/05/21   Medical Diagnosis Tremor   Onset of Illness/Injury or Date of Surgery 03/05/21   Special Instructions Tremor assistance   Surgical/Medical History Reviewed Yes   Additional Occupational Profile Info/Pertinent History of Current Problem Patient was referred to OP OT for tremor management.  He was diagnosed with intentional tremor about 10 years ago.  Family history.   Role/Living Environment   Patient role/Employment history Retired   Community/Avocational Activities Patient is very active with rowing and TRX and when warmer he golfs.  Retired.     Current Living Environment House   Prior Level - Transfers Independent   Prior Level - Ambulation Independent   Prior Level - ADLS Independent   Prior Responsibilities - IADL Meal Preparation;Housekeeping;Shopping;Yardwork;Medication management;Finances;Driving   Patient/family Goals Statement To improve ease with writing and feeding   Pain   Patient currently in pain No   Cognitive Status Examination   Orientation Orientation to person, place and time   Level of Consciousness Alert   Follows Commands and Answers Questions 100% of the time;Able to follow multistep instructions   Range of Motion (ROM)   ROM Quick Adds No deficits identified   Strength   Strength No deficits were identified   Hand Strength   Hand Dominance Right   Hand Strength Comments Denies dropping items.   Coordination   Upper Extremity Coordination Left UE impaired;Right UE impaired   Gross Motor Coordination Denies difficulties.   Left Hand, Nine Hole Peg Test (seconds) 32   Right Hand, Nine Hole Peg Test (seconds) 30.1   Coordination Comments No resting tremors noted, intentional tremors (then worsen with fatigue and after he works out.)   Transfer Skill  "  Level of [place]: Transfers independent   Tub/Shower Transfer   Tub/Shower Transfer Comments Independent   Bathing   Level of [place] - Bathing independent   Upper Body Dressing   Level of [place]: Dress Upper Body independent   Lower Body Dressing   Level of [place]: Dress Lower Body independent   Toileting   Level of [place]: Toilet independent   Grooming   Grooming Comments Reports difficulty putting toothpaste on the toothbrush due to tremors.   Eating/Self-Feeding   Level of [place]: Eating independent   Eating/Self Feeding Comments Tremors with eating.  Cutting foods are difficulty, he reports not using a fork, \"using utensils is embarassing.\"   Activity Tolerance   Activity Tolerance Tremors worsen with fatigue.   Adult OT Eval Goals   OT Eval Goals (Adult) 1    OT Goal 1   Goal Identifier 1-Tremor Management   Goal Description Patient to verbalize and utilize 3 strategies and/or AE to compensate for UE tremors for increased functional use of UEs and for increased independence with ADL/IADLs, such as handwriting, dressing, meal prep tasks, etc.   Target Date 04/10/21   Clinical Impression   Criteria for Skilled Therapeutic Interventions Met Yes, treatment indicated   OT Diagnosis decreased ease and independence with ADLs   Influenced by the following impairments tremors   Assessment of Occupational Performance 1-3 Performance Deficits   Identified Performance Deficits feeding, writing, golfing/leisure activities   Clinical Decision Making (Complexity) Low complexity   Therapy Frequency 2 visits   Predicted Duration of Therapy Intervention (days/wks) within 1 month   Risks and Benefits of Treatment have been explained. Yes   Patient, Family & other staff in agreement with plan of care Yes   Education Assessment   Barriers To Learning No Barriers   Preferred Learning Style Listening;Reading;Demonstration;Pictures/video   Total Evaluation Time   OT Eval, Low Complexity Minutes " (25422) 42

## 2021-03-12 NOTE — PROGRESS NOTES
[unfilled]                                                                               [facility]          OUTPATIENT OCCUPATIONAL THERAPY  EVALUATION  PLAN OF TREATMENT FOR OUTPATIENT REHABILITATION  (COMPLETE FOR INITIAL CLAIMS ONLY)  Patient's Last Name, First Name, M.I.  YOB: 1955  Damien Dacosta                        Provider's Name  [facility] Medical Record No.  [id number]                               Onset Date:     03/05/21   Start of Care Date:     03/11/21   Type:     ___PT   _X_OT   ___SLP Medical Diagnosis:     Tremor                          OT Diagnosis:     decreased ease and independence with ADLs Visits from SOC:  1   _________________________________________________________________________________  Plan of Treatment/Functional Goals:                       Goals  Goal Identifier: 1-Tremor Management  Goal Description: Patient to verbalize and utilize 3 strategies and/or AE to compensate for UE tremors for increased functional use of UEs and for increased independence with ADL/IADLs, such as handwriting, dressing, meal prep tasks, etc.  Target Date: 04/10/21                              Therapy Frequency: 2 visits     Predicted Duration of Therapy Intervention (days/wks): within 1 month  Roxie Jenkins OT          I CERTIFY THE NEED FOR THESE SERVICES FURNISHED UNDER        THIS PLAN OF TREATMENT AND WHILE UNDER MY CARE     (Physician co-signature of this document indicates review and certification of the therapy plan).                 ,    Certification date from: 03/12/21, Certification date to: 04/10/21               Referring Physician: Dr. Delgado     Initial Assessment        See Epic Evaluation      Start Of Care Date: 03/11/21    Agree with plan  Jonathan Delgado md  March 12, 2021

## 2021-03-15 ENCOUNTER — VIRTUAL VISIT (OUTPATIENT)
Dept: PHARMACY | Facility: CLINIC | Age: 66
End: 2021-03-15
Payer: COMMERCIAL

## 2021-03-15 DIAGNOSIS — E78.2 MIXED HYPERLIPIDEMIA: ICD-10-CM

## 2021-03-15 DIAGNOSIS — N52.9 ERECTILE DYSFUNCTION, UNSPECIFIED ERECTILE DYSFUNCTION TYPE: ICD-10-CM

## 2021-03-15 DIAGNOSIS — Z78.9 TAKES DIETARY SUPPLEMENTS: ICD-10-CM

## 2021-03-15 DIAGNOSIS — E11.9 TYPE 2 DIABETES MELLITUS WITHOUT COMPLICATION, WITHOUT LONG-TERM CURRENT USE OF INSULIN (H): ICD-10-CM

## 2021-03-15 DIAGNOSIS — R52 MILD PAIN: ICD-10-CM

## 2021-03-15 DIAGNOSIS — R25.1 TREMOR: Primary | ICD-10-CM

## 2021-03-15 DIAGNOSIS — G62.9 NEUROPATHY: ICD-10-CM

## 2021-03-15 PROCEDURE — 99607 MTMS BY PHARM ADDL 15 MIN: CPT | Mod: TEL | Performed by: PHARMACIST

## 2021-03-15 PROCEDURE — 99605 MTMS BY PHARM NP 15 MIN: CPT | Mod: TEL | Performed by: PHARMACIST

## 2021-03-15 NOTE — PROGRESS NOTES
Medication Therapy Management (MTM) Encounter    ASSESSMENT:                            Medication Adherence/Access: No issues identified    Essential tremor: discussed that anti-epileptic medications have been shown to be beneficial for essential tremor. Primidone has the best evidence, followed by topiramate and gabapentin.  Since he is already taking gabapentin we discussed that we could try splitting his dose of gabapentin to morning and evening and if he seems to respond then we could potentially increase his dose of gabapentin overall for both tremor and neuropathy management.  Patient is hesitant to start a new medication understandably so there does not seem to be a compelling reason to start primidone or topiramate at this time.    Neuropathy: Stable; may try to adjust the timing of the gabapentin to get a dual benefit for tremor control and may need to increase the dose overall if beneficial for tremor.    Type 2 Diabetes: Stable    Hyperlipidemia: Stable    Pain: Stable    ED: Stable    Vitamins/supplements: Stable; last vitamin B12 level was within normal limits at 518 on 11/30/20    PLAN:                            1. Consider trying gabapentin 1 capsule in the morning and 3 capsules in the evening. We could also try 2 in the morning and 2 in the evening. You are taking a low-dose of gabapentin so eventually we could increase the dose overall if needed. Gabapentin can improve your tremor and neuropathy.     2.  We will hold off on trying any new medications for tremor at this time.    Follow-up: 4/5/21     SUBJECTIVE/OBJECTIVE:                          Damien Dacosta  is a 65 year old male called for an initial visit. He was referred to me from Dr. Delgado.      Reason for visit: medication review.    Allergies/ADRs: Reviewed in chart  Tobacco: He reports that he has quit smoking. His smoking use included cigars and dip, chew, snus or snuff. His smokeless tobacco use includes chew.  Alcohol: 1 cocktail  "4 days/week before dinner - no clear effect on tremor  Caffeine: 1 cups/day of coffee (morning only)  Activity: exercising in the morning - more tremor activity after exercise   Past Medical History: Reviewed in chart    Medication Adherence/Access: no issues reported    Essential tremor: Currently taking gabapentin 1200 mg nightly (intended for neuropathy). He is now off propranolol - it really wasn't helpful for his tremor and his heart rate was down to 50 while on propranolol. Patient states that tremor comes and goes at different times of the day. It is bothersome to him but he is hesitant to try new medications. Primidone and topiramate have been discussed. He does not recall whether taking gabapentin during the day improved his tremor in the past.     Neuropathy: Currently taking gabapentin 1200 mg nightly. The pain is most bothersome when he lies down to go to bed. It's \"very noticeable\" and difficult to fall asleep without the gabapentin. He used to take 300 mg gabapentin in the morning but it didn't seem necessary as nighttime is when his symptoms are most bothersome.     Type 2 Diabetes:  Currently taking metformin 500 mg AM/1000 mg nightly. Patient is not experiencing side effects.. Patient follows with Dr. Root in endocrinology.  Blood sugar monitoring: not discussed today  Symptoms of low blood sugar? none  Symptoms of high blood sugar? none  Aspirin: Not taking due to ?  Statin: Yes: atorvastatin 40 mg daily   ACEi/ARB: No.   A1c: January 2021 - 5.8% reported by patient    Hyperlipidemia: Current therapy includes atorvastatin 40 mg daily.  Patient reports no significant myalgias or other side effects. Last lipid panel was 3/16/20:  Total cholesterol 154  Triglycerides 118  HDL 50  LDL 80    Pain: Taking Tylenol periodically for pain, ie: after golfing.    ED: Cialis as needed    Vitamins/supplements: Methyl-B12 supplement, multivitamin, and magnesium glycinate daily    Today's Vitals: There were no " vitals taken for this visit.  ----------------    I spent 29 minutes with this patient today. All changes were made via verbal approval with Dr. Delgado. A copy of the visit note was provided to the patient's referring provider.    The patient was sent via [employer] a summary of these recommendations.     Joaquina Melgar, Pharm.D.  Medication Therapy Management Pharmacist  Phone: [phone]    Telemedicine Visit Details  Type of service:  Telephone visit  Start Time: 1:01 PM  End Time: 1:30 PM  Originating Location (patient location): [place]  Distant Location (provider location):  [facility] [place]      Medication Therapy Recommendations  Tremor    Current Medication: gabapentin (NEURONTIN) 300 MG capsule   Rationale: Frequency inappropriate - Dosage too low - Effectiveness   Recommendation: Increase Frequency - gabapentin 300 MG capsule - take 1 capsule AM/3 capsules PM   Status: Patient Agreed - Adherence/Education

## 2021-03-15 NOTE — LETTER
"        Date: 3/22/2021    Damien Dacosta Jr.  [address] [place] [address]    Dear Mr. Dacosta,    Thank you for talking with me on 3/15/21 about your health and medications. Medicare s MTM (Medication Therapy Management) program helps you understand your medications and use them safely.      This letter includes an action plan (Medication Action Plan) and medication list (Personal Medication List). The action plan has steps you should take to help you get the best results from your medications. The medication list will help you keep track of your medications and how to use them the right way.       Have your action plan and medication list with you when you talk with your doctors, pharmacists, and other healthcare providers in your care team.     Ask your doctors, pharmacists, and other healthcare providers to update the action plan and medication list at every visit.     Take your medication list with you if you go to the hospital or emergency room.     Give a copy of the action plan and medication list to your family or caregivers.     If you want to talk about this letter or any of the papers with it, please call   [phone].We look forward to working with you, your doctors, and other healthcare providers to help you stay healthy through the [facility] MTM program.    Sincerely,  Joaquina Melgar [facility]    Enclosed: Medication Action Plan and Personal Medication List    MEDICATION ACTION PLAN FOR Damien Dacosta Jr.,  1955     This action plan will help you get the best results from your medications if you:   1. Read \"What we talked about.\"   2. Take the steps listed in the \"What I need to do\" boxes.   3. Fill in \"What I did and when I did it.\"   4. Fill in \"My follow-up plan\" and \"Questions I want to ask.\"     Have this action plan with you when you talk with your doctors, pharmacists, and other healthcare providers in your care team. Share this with your family or caregivers too.  DATE " PREPARED: 3/22/2021  What we talked about: tremor                                                  What I need to do: Consider trying gabapentin 1 capsule in the morning and 3 capsules in the evening. We could also try 2 in the morning and 2 in the evening. You are taking a low-dose of gabapentin so eventually we could increase the dose overall if needed. Gabapentin can improve your tremor and neuropathy.    What I did and when I did it:                                              My follow-up plan:                 Questions I want to ask:              If you have any questions about your action plan, call Joaquina Melgar [facility], Phone: [phone] , Monday-Friday 8-4:30pm.           PERSONAL MEDICATION LIST FOR Damien Dacosta Jr.,  1955     This medication list was made for you after we talked. We also used information from your doctor's chart.      Use blank rows to add new medications. Then fill in the dates you started using them.    Cross out medications when you no longer use them. Then write the date and why you stopped using them.    Ask your doctors, pharmacists, and other healthcare providers to update this list at every visit. Keep this list up-to-date with:       Prescription medications    Over the counter drugs     Herbals    Vitamins    Minerals      If you go to the hospital or emergency room, take this list with you. Share this with your family or caregivers too.     DATE PREPARED: 3/22/2021  Allergies or side effects: Shellfish allergy and Shellfish-derived products     Medication:  ACETAMINOPHEN 325 MG PO TABS      How I use it:  Take 2 tablets (650 mg) by mouth every 4 hours as needed for mild pain      Why I use it: Pain     Prescriber:  Gita Malcolm PA-C      Date I started using it:       Date I stopped using it:         Why I stopped using it:            Medication:  AMOXICILLIN 500 MG PO TABS      How I use it:  Take 4 tabs oral 1 hour before dental work      Why I use it:   dental health    Prescriber:  Dr. Lewis      Date I started using it:       Date I stopped using it:         Why I stopped using it:            Medication:  ATORVASTATIN CALCIUM 40 MG PO TABS      How I use it:  Take 1 tablet (40 mg) by mouth daily      Why I use it: High cholesterol    Prescriber:  Dr. Lewis      Date I started using it:       Date I stopped using it:         Why I stopped using it:            Medication:  B-12 METHYLCOBALAMIN PO      How I use it:  Take 1 tablet by mouth daily      Why I use it:  general health    Prescriber:  Self       Date I started using it:       Date I stopped using it:         Why I stopped using it:            Medication:  GABAPENTIN 300 MG PO CAPS      How I use it:   Take 4 capsules by mouth nightly      Why I use it:  Neuropathy    Prescriber:  Dr. Lewis      Date I started using it:       Date I stopped using it:         Why I stopped using it:            Medication:  MAGNESIUM GLYCINATE PO      How I use it:  Take 2 tablets by mouth daily      Why I use it:  General health    Prescriber:   Self      Date I started using it:       Date I stopped using it:         Why I stopped using it:            Medication:  METFORMIN  MG PO TABS      How I use it:   Take 1 tablet by mouth in the morning and take 2 tablets nightly      Why I use it:  Diabetes    Prescriber:  Dr. Root      Date I started using it:       Date I stopped using it:         Why I stopped using it:            Medication:  ONE-DAILY MULTI VITAMINS PO TABS      How I use it:  Take 1 tablet by mouth daily      Why I use it:  general health    Prescriber:  Self       Date I started using it:       Date I stopped using it:         Why I stopped using it:            Medication:  TADALAFIL 5 MG PO TABS      How I use it:  Take 1 tablet (5 mg) by mouth daily as needed (erectile dysfunction)      Why I use it:  ED    Prescriber:  Dr. Lewis      Date I started using it:       Date I stopped using it:          Why I stopped using it:            Medication:         How I use it:         Why I use it:      Prescriber:         Date I started using it:       Date I stopped using it:         Why I stopped using it:            Medication:         How I use it:         Why I use it:      Prescriber:         Date I started using it:       Date I stopped using it:         Why I stopped using it:            Medication:         How I use it:         Why I use it:      Prescriber:         Date I started using it:       Date I stopped using it:         Why I stopped using it:              Other Information:     If you have any questions about your medication list, call Joaquina Melgar [facility], Phone: [phone] , Monday-Friday 8-4:30pm.    According to the Paperwork Reduction Act of 1995, no persons are required to respond to a collection of information unless it displays a valid OMB control number. The valid OMB number for this information collection is 5625-8425. The time required to complete this information collection is estimated to average 40 minutes per response, including the time to review instructions, searching existing data resources, gather the data needed, and complete and review the information collection. If you have any comments concerning the accuracy of the time estimate(s) or suggestions for improving this form, please write to: CMS, Attn: LISA Reports Clearance Officer, [address] [place] [id number].

## 2021-03-19 NOTE — PATIENT INSTRUCTIONS
Recommendations from today's MTM visit:                                                    Today we reviewed what your medicines are for, how to know if they are working, that your medicines are safe and how to make your medicine regimen as easy as possible.      1. Consider trying gabapentin 1 capsule in the morning and 3 capsules in the evening. We could also try 2 in the morning and 2 in the evening. You are taking a low-dose of gabapentin so eventually we could increase the dose overall if needed. Gabapentin can improve your tremor and neuropathy.     2.  We will hold off on trying any new medications for tremor at this time.    It was great to speak with you today.  I value your experience and would be very thankful for your time with providing feedback on our clinic survey. You may receive a survey via email or text message in the next few days.     Next MTM visit: 4/5/21 at 1 pm - I will call you!    To schedule another MTM appointment, please call the clinic directly or you may call the MTM scheduling line at [phone] or toll-free at [phone].     My Clinical Pharmacist's contact information:                                                      It was a pleasure talking with you today!  Please feel free to contact me with any questions or concerns you have.      Joaquina Melgar, Pharm.D.  Medication Therapy Management Pharmacist  Phone: [phone]

## 2021-03-20 ENCOUNTER — HEALTH MAINTENANCE LETTER (OUTPATIENT)
Age: 66
End: 2021-03-20

## 2021-04-05 ENCOUNTER — VIRTUAL VISIT (OUTPATIENT)
Dept: PHARMACY | Facility: CLINIC | Age: 66
End: 2021-04-05
Payer: COMMERCIAL

## 2021-04-05 DIAGNOSIS — G62.9 NEUROPATHY: ICD-10-CM

## 2021-04-05 DIAGNOSIS — R25.1 TREMOR: Primary | ICD-10-CM

## 2021-04-05 PROCEDURE — 99606 MTMS BY PHARM EST 15 MIN: CPT | Mod: TEL | Performed by: PHARMACIST

## 2021-04-05 PROCEDURE — 99607 MTMS BY PHARM ADDL 15 MIN: CPT | Mod: TEL | Performed by: PHARMACIST

## 2021-04-05 RX ORDER — GABAPENTIN 300 MG/1
CAPSULE ORAL
Qty: 270 CAPSULE | Refills: 11 | Status: SHIPPED | OUTPATIENT
Start: 2021-04-05 | End: 2021-11-27

## 2021-04-05 NOTE — PROGRESS NOTES
Medication Therapy Management (MTM) Encounter    ASSESSMENT:                            Medication Adherence/Access: No issues identified    Essential tremor/neuropathy: Patient seems to tolerate gabapentin well so we will continue to titrate the dose. One randomized controlled trial compared gabapentin 1200 mg/day to placebo and tremor was improved in the gabapentin group. Will continue to titrate to up to 900 mg 3 times/day to get more gabapentin during the day for improvement in tremor. If not effective, may need to consider primidone or topiramate as alternative treatments.     PLAN:                            1. Add mid-day dose of gabapentin 300 mg for tremor.  2. You may increase the dose of gabapentin by 300 mg (1 capsule) per week thereafter as follows:  Week 1 - 1 pill/1 pill/3 pills  Week 2 - 2 pills/1 pill/3 pills  Week 3 - 2 pills/2 pills/3 pills  Week 4 - 3 pills/2 pills/3 pills  Week 5 - 3 pills/3 pills/3 pills   3. If you feel drowsy with the increase in dose you can go back to the previous dose or contact me.     Follow-up: 5/10/21 at 1 pm    SUBJECTIVE/OBJECTIVE:                          Damien Dacosta  is a 65 year old male called for a follow-up visit. He was referred to me from Dr. Delgado.  Today's visit is a follow-up MTM visit from 3/15/21     Reason for visit: follow up on gabapentin.    Allergies/ADRs: Reviewed in chart  Tobacco: He reports that he has quit smoking. His smoking use included cigars and dip, chew, snus or snuff. His smokeless tobacco use includes chew.  Alcohol: 1 cocktail 4 days/week before dinner - no clear effect on tremor  Caffeine:1 cups/day of coffee (morning only)  Activity: exercising in the morning - more tremor activity after exercise   Past Medical History: Reviewed in chart    Medication Adherence/Access: no issues reported    Essential tremor/neuropathy: Currently taking gabapentin 300 mg in the morning and 900 mg at night. Patient states that his tremor seems  to be somewhat improved since adding a morning dose of gabapentin. Not significantly better but a slight difference. His neuropathy has not been any worse since decreasing the nighttime dose. He does not endorse any increase in drowsiness or other side effects in the AM with the additional gabapentin. He would prefer to slowly increase gabapentin rather than adding another medication for tremor at this time.    Today's Vitals: There were no vitals taken for this visit.  ----------------    I spent 9 minutes with this patient today. All changes were made via verbal approval with Dr. Delgado. A copy of the visit note was provided to the patient's referring provider.    The patient was sent via [employer] a summary of these recommendations.     Joaquina Melgar, Pharm.D.  Medication Therapy Management Pharmacist  Phone: [phone]    Telemedicine Visit Details  Type of service:  Telephone visit  Start Time: 1:02 PM  End Time: 1:11 PM  Originating Location (patient location): [place]  Distant Location (provider location):  [facility] MTM      Medication Therapy Recommendations  Tremor    Current Medication: gabapentin (NEURONTIN) 300 MG capsule   Rationale: Dose too low - Dosage too low - Effectiveness   Recommendation: Increase Dose - gabapentin 300 MG capsule - titrate up to 3 capsules 3 times/day as tolerated   Status: Accepted per Provider

## 2021-04-05 NOTE — PATIENT INSTRUCTIONS
Recommendations from today's MTM visit:                                                      1. Add mid-day dose of gabapentin 300 mg for tremor.    2. You may increase the dose of gabapentin by 300 mg (1 capsule) per week thereafter as follows:  Week 1 - 1 pill/1 pill/3 pills  Week 2 - 2 pills/1 pill/3 pills  Week 3 - 2 pills/2 pills/3 pills  Week 4 - 3 pills/2 pills/3 pills  Week 5 - 3 pills/3 pills/3 pills     3. If you feel drowsy with the increase in dose you can go back to the previous dose or contact me.     Next MTM visit: 5/10/21 at 1 pm - I will call you!    It was great to speak with you today.  I value your experience and would be very thankful for your time with providing feedback on our clinic survey. You may receive a survey via email or text message in the next few days.     To schedule another MTM appointment, please call the clinic directly or you may call the MTM scheduling line at [phone] or toll-free at [phone].     My Clinical Pharmacist's contact information:                                                      Please feel free to contact me with any questions or concerns you have.      Joaquina Melgar, Pharm.D.  Medication Therapy Management Pharmacist  Phone: [phone]

## 2021-05-10 ENCOUNTER — VIRTUAL VISIT (OUTPATIENT)
Dept: PHARMACY | Facility: CLINIC | Age: 66
End: 2021-05-10
Payer: COMMERCIAL

## 2021-05-10 DIAGNOSIS — R25.1 TREMOR: Primary | ICD-10-CM

## 2021-05-10 DIAGNOSIS — G62.9 NEUROPATHY: ICD-10-CM

## 2021-05-10 PROCEDURE — 99606 MTMS BY PHARM EST 15 MIN: CPT | Performed by: PHARMACIST

## 2021-05-10 NOTE — PROGRESS NOTES
Medication Therapy Management (MTM) Encounter    ASSESSMENT:                            Medication Adherence/Access: No issues identified    Essential tremor/neuropathy: stable; will continue gabapentin as-is for now but patient encouraged to reach out should he like to try a higher dose of gabapentin or if he'd like to try an alternate medication like primidone or topiramate    PLAN:                            1. Continue gabapentin as-is  2. Please reach out to neurology if you'd like to try a different medication for tremor    Follow-up: Return in about 1 year (around 5/10/2022) for Medication Therapy Management.    SUBJECTIVE/OBJECTIVE:                          Damien Dacosta Jr. is a 65 year old male called for a follow-up visit. He was referred to me from Dr. Delgado.  Today's visit is a follow-up MTM visit from 4/5/21     Reason for visit: follow up on gabapentin for tremor.    Allergies/ADRs: Reviewed in chart  Tobacco: He reports that he has quit smoking. His smoking use included cigars and dip, chew, snus or snuff. His smokeless tobacco use includes chew.  Alcohol: 1 cocktail 4 days/week before dinner - no clear effect on tremor  Caffeine: 1 cups/day of coffee (morning only)  Activity: exercising in the morning - more tremor activity after exercise   Past Medical History: Reviewed in chart    Medication Adherence/Access: no issues reported    Essential tremor/neuropathy: Currently taking gabapentin 300 mg AM, 300 mg mid-day, and 900 mg-1200 mg nightly. Patient will take 1200 mg gabapentin at night if feet are bothering him but otherwise 900 mg is usually effective enough. He has not increased the daytime doses beyond 300 mg as he would prefer to not take more medication. Although his tremor is bothersome he is not interested in trying other medications at this time. He is staying active playing golf. Tremor affects his golf game somewhat and OT had some recommendations of modifications (different  or  heavy putter) but he has opted to not make any changes at this time. He will continue on current regimen and will let us know if something changes.    Today's Vitals: There were no vitals taken for this visit.  ----------------    I spent 8 minutes with this patient today. I offer these suggestions for consideration by Dr. Delgado. A copy of the visit note was provided to the patient's referring provider.    The patient was sent via [employer] a summary of these recommendations.     Joaquina Melgar, Pharm.D.  Medication Therapy Management Pharmacist  Phone: [phone]    Telemedicine Visit Details  Type of service:  Telephone visit  Start Time: 11:29 AM  End Time: 11:37 AM  Originating Location (patient location): Home  Distant Location (provider location):  [facility]

## 2021-05-10 NOTE — PATIENT INSTRUCTIONS
Recommendations from today's MTM visit:                                                      1. Continue gabapentin as-is    2. Please reach out to neurology if you'd like to try a different medication for tremor    Follow-up: Return in about 1 year (around 5/10/2022) for Medication Therapy Management.    It was great to speak with you today.  I value your experience and would be very thankful for your time with providing feedback on our clinic survey. You may receive a survey via email or text message in the next few days.     To schedule another MTM appointment, please call the clinic directly or you may call the MTM scheduling line at [phone] or toll-free at [phone].     My Clinical Pharmacist's contact information:                                                      Please feel free to contact me with any questions or concerns you have.      Joaquina Melgar, Pharm.D.  Medication Therapy Management Pharmacist  Phone: [phone]

## 2021-05-21 NOTE — PROGRESS NOTES
Outpatient Occupational Therapy Discharge Note     Patient: Damien Dacosta Jr.  : 1955    Beginning/End Dates of Reporting Period:  3/11/21 to 3/11/21    Referring Provider: Dr. Delgado    Therapy Diagnosis: Tremor    Client Self Report:   Called patient after initial evaluation to follow up on use of tremor management techniques.  He reports that he hadn't tried using any yet.    Goals:     Goal Identifier 1-Tremor Management   Goal Description Patient to verbalize and utilize 3 strategies and/or AE to compensate for UE tremors for increased functional use of UEs and for increased independence with ADL/IADLs, such as handwriting, dressing, meal prep tasks, etc.   Target Date 04/10/21   Date Met      Progress:     Progress Toward Goals:   Not assessed this period.  Patient was seen for OT eval and 1 treatment session for education in tremor management and AE to assist with writing, feeding, ADLs.  Patient did not schedule further sessions after evaluation.     Plan:  Discharge from therapy.    Discharge:    Reason for Discharge: Patient has failed to schedule further appointments.  Refer to initial evaluation    Equipment Issued: Resources for weighted pen    Discharge Plan: Patient to continue home program.

## 2021-07-10 ENCOUNTER — HEALTH MAINTENANCE LETTER (OUTPATIENT)
Age: 66
End: 2021-07-10

## 2021-09-04 ENCOUNTER — HEALTH MAINTENANCE LETTER (OUTPATIENT)
Age: 66
End: 2021-09-04

## 2021-10-22 ENCOUNTER — HOSPITAL ENCOUNTER (EMERGENCY)
Facility: CLINIC | Age: 66
Discharge: HOME | End: 2021-10-22
Attending: EMERGENCY MEDICINE | Admitting: EMERGENCY MEDICINE
Payer: COMMERCIAL

## 2021-10-22 VITALS
OXYGEN SATURATION: 97 % | HEART RATE: 51 BPM | TEMPERATURE: 97.6 F | RESPIRATION RATE: 18 BRPM | HEIGHT: 74 IN | BODY MASS INDEX: 30.03 KG/M2 | SYSTOLIC BLOOD PRESSURE: 143 MMHG | DIASTOLIC BLOOD PRESSURE: 78 MMHG | WEIGHT: 234 LBS

## 2021-10-22 DIAGNOSIS — R31.9 HEMATURIA, UNSPECIFIED TYPE: ICD-10-CM

## 2021-10-22 DIAGNOSIS — N32.0 BLADDER OUTLET OBSTRUCTION: ICD-10-CM

## 2021-10-22 PROCEDURE — 250N000013 HC RX MED GY IP 250 OP 250 PS 637: Performed by: EMERGENCY MEDICINE

## 2021-10-22 PROCEDURE — 99284 EMERGENCY DEPT VISIT MOD MDM: CPT | Mod: 25

## 2021-10-22 PROCEDURE — 51798 US URINE CAPACITY MEASURE: CPT

## 2021-10-22 RX ORDER — SULFAMETHOXAZOLE/TRIMETHOPRIM 800-160 MG
1 TABLET ORAL 2 TIMES DAILY
Qty: 10 TABLET | Refills: 0 | Status: SHIPPED | OUTPATIENT
Start: 2021-10-22 | End: 2021-10-27

## 2021-10-22 RX ORDER — SULFAMETHOXAZOLE/TRIMETHOPRIM 800-160 MG
1 TABLET ORAL ONCE
Status: COMPLETED | OUTPATIENT
Start: 2021-10-22 | End: 2021-10-22

## 2021-10-22 RX ORDER — LIDOCAINE HYDROCHLORIDE 20 MG/ML
10 JELLY TOPICAL ONCE
Status: DISCONTINUED | OUTPATIENT
Start: 2021-10-22 | End: 2021-10-23 | Stop reason: HOSPADM

## 2021-10-22 RX ADMIN — SULFAMETHOXAZOLE AND TRIMETHOPRIM 1 TABLET: 800; 160 TABLET ORAL at 23:18

## 2021-10-22 ASSESSMENT — MIFFLIN-ST. JEOR: SCORE: 1901.17

## 2021-10-22 ASSESSMENT — ENCOUNTER SYMPTOMS
CHILLS: 0
HEMATURIA: 1
DIFFICULTY URINATING: 1
FEVER: 0

## 2021-10-22 NOTE — ED TRIAGE NOTES
Pt has had recent cystoscopy on Wed - he is having blood in his urine now- he did have some better output at 1730 but was told to come in by urologist . Other bladder hx as well.

## 2021-10-23 NOTE — ED PROVIDER NOTES
"  History   Chief Complaint:  Urinary Retention and Hematuria    HPI   Damien Dacosta Jr. is a 66 year old male with history of BPH who had a cystoscopy performed on 10/20 who presents for evaluation of urinary retention. The patient reports that following his recent cystoscopy he had been feeling well until this afternoon when he was laying down and he started to develop hematuria, and since then he has had some difficulty urinating. Due to concern for this he called his urology clinic and was advised to come into the ED for evaluation. He denies any fever or chills. He is not anticoagulated and does not take Aspirin. His urologist is Dr. Herbert of [facility].     Review of Systems   Constitutional: Negative for chills and fever.   Genitourinary: Positive for difficulty urinating and hematuria.   All other systems reviewed and are negative.      Allergies:  No known drug allergies     Medications:  Tylenol   Amoxicillin   Lipitor  Gabapentin  Magnesium glycinate   Metformin   Cialis     Past Medical History:     Arthritis   Diabetes mellitus, type II   Hyperlipidemia   BPH with urinary frequency       Past Surgical History:    Right knee arthroplasty  Left knee arthroplasty  Colonoscopy   Tonsillectomy  TURP   Knee arthroscopy      Family History:    Uterine cancer - Mother  Tremor - Mother, sister, and brother   Prostate cancer - Father and brother   CAD - Father   Heart failure - Father   Diabetes - Father   Bone cancer - Sister     Social History:  Marital status:    The patient presents to the ED   His urologist is Dr. Herbert of [facility].    Physical Exam     Patient Vitals for the past 24 hrs:   BP Temp Temp src Pulse Resp SpO2 Height Weight   10/22/21 2318 (!) 143/78 -- -- 51 18 97 % -- --   10/22/21 1855 (!) 194/82 97.6  F (36.4  C) Temporal 59 18 97 % 1.88 m (6' 2\") 106.1 kg (234 lb)       Physical Exam  Constitutional: Middle aged white male supine. No respiratory distress. "   HENT: No signs of trauma.   Eyes: EOM are normal. Pupils are equal, round, and reactive to light.   Neck: Normal range of motion. No JVD present. No cervical adenopathy.  Cardiovascular: Regular rhythm.  Exam reveals no gallop and no friction rub.    No murmur heard.  Pulmonary/Chest: Bilateral breath sounds normal. No wheezes, rhonchi or rales.  Abdominal: Soft. Mild fullness suprapubic. No rebound or guarding. 2+ femoral pulses.   Genitourinary: Normal male.   Musculoskeletal: No edema. No tenderness.   Lymphadenopathy: No lymphadenopathy.   Neurological: Alert and oriented to person, place, and time. Normal strength. Coordination normal.   Skin: Skin is warm and dry. No rash noted. No erythema.     Emergency Department Course     Emergency Department Course:  Reviewed:  I reviewed nursing notes, vitals and past medical history    Assessments:  1919: I obtained history and examined the patient as noted above.   2110 I rechecked the patient and explained findings.     Interventions:  2307: sulfamethoxazole-trimethoprim (BACTRIM DS) 800-160 MG per tablet 1 tablet, PO   1941: lidocaine (XYLOCAINE) 2 % external gel 10 mL, topical     Disposition:  Home with catheter    Impression & Plan     Medical Decision Making:  This is a 66 years old male who is unable to void. He had a cysto two days ago for a bladder stone. He began bleeding on the golf course today. He could initially urinate but now can no longer urinate. He feels very distended. On exam, he has mild suprapubic tenderness and blood in the urethra meatus. On ultrasound, he had over 500 cc of fluid. Nursing made several attempts to place three way catheter without luck. I then tried to place a catheter using a 22 French coude tip and also a 24 straight, both three ways, but was unable to pass the part through the prosthetic urethra. I talked to , who was on call and he graciously came in. Under fibro optic vision, he was able to pass the catheter.  The patient has been irrigated and appears to be clear. The catheter will be left in on Dr. Gonzalez's request that we place on the patient on a few days of antibiotics, and suggested Bactrim. He got one tablet and I will give him a five day supply. He will follow-up with his urologist and he should return to the emergency department for any other problems.     Diagnosis:    ICD-10-CM    1. Bladder outlet obstruction  N32.0    2. Hematuria, unspecified type  R31.9        Discharge Medications:  Discharge Medication List as of 10/22/2021 11:22 PM      START taking these medications    Details   sulfamethoxazole-trimethoprim (BACTRIM DS) 800-160 MG tablet Take 1 tablet by mouth 2 times daily for 10 doses, Disp-10 tablet, R-0, Local Print             Scribe Disclosure:  I, Reji Pichardo, am serving as a scribe at 7:19 PM on 10/22/2021 to document services personally performed by Yevgeniy Gil MD based on my observations and the provider's statements to me.         Yevgeniy Gil MD  10/23/21 0001

## 2021-10-25 NOTE — CONSULTS
Consult Date: 10/22/2021    CHIEF COMPLAINT:  Urinary retention, gross hematuria, clot retention.    BRIEF HISTORY AND PHYSICAL:  The patient is a 66-year-old male who has seen Dr. Herbert from [facility] in the past with a history of BPH.  The patient has undergone previous TURP.  The patient was seen several days ago in the office, did undergo a diagnostic cystoscopy, was found to have a bladder stone and was going to be scheduled for further management in the near future.  The patient states that while playing golf he developed difficulty voiding and the passage of a large amount of blood.  He was then referred to the [facility] Emergency Department.  At that time, he was found to have over 600 mL in his bladder.  Nursing personnel and physicians were unable to pass a Horton catheter and urology was consulted.    He does have a history of adult onset diabetes mellitus under relatively good control.    The patient was prepped and draped in the usual sterile fashion.  He was given % Xylocaine jelly for analgesia.  He underwent a flexible cystoscopy.  There was a significant amount of blood within the urethra.  There appeared to be a small false passage and a rather somewhat tight prostatic sphincter and prostatic fossa, there was significant blood, so it was difficult to visualize. The bladder was eventually entered and again was filled with clots.  A Bentson wire was placed through the flexible scope into the bladder.  An Councill catheter then was gently passed over the wire into the bladder.  The bladder was drained of approximately 750 mL of bloody urine.  He underwent multiple irrigations with removal of a large amount of clot material.  Eventually, however, the urine did clear completely.    The patient felt well and the plan was to discharge him later with the Horton catheter and leg bag and he will follow up with Dr. Herbert early week.    DIAGNOSIS:   1.  Gross  hematuria, clot retention.  2.  History of benign prostatic hypertrophy (BPH) status post previous transurethral resection of the prostate (TURP).  3.  Adult onset diabetes mellitus.      PROCEDURES:    1.  Emergency Department consultation.    2.  Flexible cystoscopy with placement of a Horton catheter over a wire.    3.  Bladder irrigation of the large volume of clots.      TOTAL TIME: 1 hour.    Gary Gonzalez MD        D: 10/24/2021   T: 10/25/2021   MT: [id number]    Name:     BLAKE MACIAS  MRN:      [id number]        Account:      [id number]   :      1955           Consult Date: 10/22/2021     Document: [id number]    cc:  Arsh Herbert MD

## 2021-10-28 DIAGNOSIS — Z11.59 ENCOUNTER FOR SCREENING FOR OTHER VIRAL DISEASES: ICD-10-CM

## 2021-10-30 ENCOUNTER — HEALTH MAINTENANCE LETTER (OUTPATIENT)
Age: 66
End: 2021-10-30

## 2021-11-18 ENCOUNTER — TRANSFERRED RECORDS (OUTPATIENT)
Dept: MULTI SPECIALTY CLINIC | Facility: CLINIC | Age: 66
End: 2021-11-18
Payer: COMMERCIAL

## 2021-11-18 LAB
ALT SERPL-CCNC: 19 U/L (ref 9–46)
AST SERPL-CCNC: 20 U/L (ref 10–35)
CREATININE (EXTERNAL): 0.93 MG/DL (ref 0.7–1.25)
GFR ESTIMATED (EXTERNAL): 84 ML/MIN/1.73M2
GFR ESTIMATED (IF AFRICAN AMERICAN) (EXTERNAL): 97 ML/MIN/1.73M2
GLUCOSE (EXTERNAL): 102 MG/DL (ref 65–99)
HBA1C MFR BLD: 5.9 % (ref 4–6)
POTASSIUM (EXTERNAL): 4.9 MMOL/L (ref 3.5–5.3)

## 2021-11-22 ENCOUNTER — LAB (OUTPATIENT)
Dept: URGENT CARE | Facility: URGENT CARE | Age: 66
End: 2021-11-22
Attending: UROLOGY
Payer: COMMERCIAL

## 2021-11-22 DIAGNOSIS — Z11.59 ENCOUNTER FOR SCREENING FOR OTHER VIRAL DISEASES: ICD-10-CM

## 2021-11-22 LAB — SARS-COV-2 RNA RESP QL NAA+PROBE: NEGATIVE

## 2021-11-22 PROCEDURE — U0003 INFECTIOUS AGENT DETECTION BY NUCLEIC ACID (DNA OR RNA); SEVERE ACUTE RESPIRATORY SYNDROME CORONAVIRUS 2 (SARS-COV-2) (CORONAVIRUS DISEASE [COVID-19]), AMPLIFIED PROBE TECHNIQUE, MAKING USE OF HIGH THROUGHPUT TECHNOLOGIES AS DESCRIBED BY CMS-2020-01-R: HCPCS

## 2021-11-22 PROCEDURE — U0005 INFEC AGEN DETEC AMPLI PROBE: HCPCS

## 2021-11-24 ENCOUNTER — ANESTHESIA EVENT (OUTPATIENT)
Dept: SURGERY | Facility: HOSPITAL | Age: 66
End: 2021-11-24
Payer: COMMERCIAL

## 2021-11-24 RX ORDER — TAMSULOSIN HYDROCHLORIDE 0.4 MG/1
0.4 CAPSULE ORAL EVERY EVENING
COMMUNITY
End: 2022-02-06

## 2021-11-26 ENCOUNTER — ANESTHESIA (OUTPATIENT)
Dept: SURGERY | Facility: HOSPITAL | Age: 66
End: 2021-11-26
Payer: COMMERCIAL

## 2021-11-26 ENCOUNTER — HOSPITAL ENCOUNTER (OUTPATIENT)
Facility: HOSPITAL | Age: 66
Discharge: HOME | End: 2021-11-26
Attending: UROLOGY | Admitting: UROLOGY
Payer: COMMERCIAL

## 2021-11-26 VITALS
BODY MASS INDEX: 30.74 KG/M2 | DIASTOLIC BLOOD PRESSURE: 60 MMHG | OXYGEN SATURATION: 98 % | TEMPERATURE: 97.8 F | RESPIRATION RATE: 18 BRPM | HEART RATE: 42 BPM | SYSTOLIC BLOOD PRESSURE: 122 MMHG | WEIGHT: 239.4 LBS

## 2021-11-26 DIAGNOSIS — N40.1 BENIGN PROSTATIC HYPERPLASIA WITH URINARY FREQUENCY: Primary | ICD-10-CM

## 2021-11-26 DIAGNOSIS — R35.0 BENIGN PROSTATIC HYPERPLASIA WITH URINARY FREQUENCY: Primary | ICD-10-CM

## 2021-11-26 LAB
GLUCOSE BLDC GLUCOMTR-MCNC: 122 MG/DL (ref 70–99)
GLUCOSE BLDC GLUCOMTR-MCNC: 134 MG/DL (ref 70–99)

## 2021-11-26 PROCEDURE — 250N000009 HC RX 250: Performed by: UROLOGY

## 2021-11-26 PROCEDURE — 82365 CALCULUS SPECTROSCOPY: CPT | Performed by: UROLOGY

## 2021-11-26 PROCEDURE — 370N000017 HC ANESTHESIA TECHNICAL FEE, PER MIN: Performed by: UROLOGY

## 2021-11-26 PROCEDURE — 258N000003 HC RX IP 258 OP 636: Performed by: NURSE ANESTHETIST, CERTIFIED REGISTERED

## 2021-11-26 PROCEDURE — 250N000011 HC RX IP 250 OP 636: Performed by: NURSE ANESTHETIST, CERTIFIED REGISTERED

## 2021-11-26 PROCEDURE — 250N000011 HC RX IP 250 OP 636: Performed by: NURSE PRACTITIONER

## 2021-11-26 PROCEDURE — 360N000078 HC SURGERY LEVEL 5, PER MIN: Performed by: UROLOGY

## 2021-11-26 PROCEDURE — 258N000001 HC RX 258: Performed by: UROLOGY

## 2021-11-26 PROCEDURE — 710N000009 HC RECOVERY PHASE 1, LEVEL 1, PER MIN: Performed by: UROLOGY

## 2021-11-26 PROCEDURE — 710N000012 HC RECOVERY PHASE 2, PER MINUTE: Performed by: UROLOGY

## 2021-11-26 PROCEDURE — 999N000141 HC STATISTIC PRE-PROCEDURE NURSING ASSESSMENT: Performed by: UROLOGY

## 2021-11-26 PROCEDURE — 250N000025 HC SEVOFLURANE, PER MIN: Performed by: UROLOGY

## 2021-11-26 PROCEDURE — 258N000003 HC RX IP 258 OP 636: Performed by: ANESTHESIOLOGY

## 2021-11-26 PROCEDURE — 272N000001 HC OR GENERAL SUPPLY STERILE: Performed by: UROLOGY

## 2021-11-26 PROCEDURE — 82962 GLUCOSE BLOOD TEST: CPT

## 2021-11-26 PROCEDURE — 250N000009 HC RX 250: Performed by: NURSE ANESTHETIST, CERTIFIED REGISTERED

## 2021-11-26 RX ORDER — CEPHALEXIN 500 MG/1
500 CAPSULE ORAL 2 TIMES DAILY
Qty: 6 CAPSULE | Refills: 0 | Status: SHIPPED | OUTPATIENT
Start: 2021-11-26 | End: 2021-12-10

## 2021-11-26 RX ORDER — MEPERIDINE HYDROCHLORIDE 25 MG/ML
12.5 INJECTION INTRAMUSCULAR; INTRAVENOUS; SUBCUTANEOUS
Status: DISCONTINUED | OUTPATIENT
Start: 2021-11-26 | End: 2021-11-26 | Stop reason: HOSPADM

## 2021-11-26 RX ORDER — ONDANSETRON 4 MG/1
4 TABLET, ORALLY DISINTEGRATING ORAL EVERY 30 MIN PRN
Status: DISCONTINUED | OUTPATIENT
Start: 2021-11-26 | End: 2021-11-26 | Stop reason: HOSPADM

## 2021-11-26 RX ORDER — NALOXONE HYDROCHLORIDE 0.4 MG/ML
0.4 INJECTION, SOLUTION INTRAMUSCULAR; INTRAVENOUS; SUBCUTANEOUS
Status: DISCONTINUED | OUTPATIENT
Start: 2021-11-26 | End: 2021-11-26 | Stop reason: HOSPADM

## 2021-11-26 RX ORDER — FENTANYL CITRATE 50 UG/ML
25 INJECTION, SOLUTION INTRAMUSCULAR; INTRAVENOUS EVERY 5 MIN PRN
Status: DISCONTINUED | OUTPATIENT
Start: 2021-11-26 | End: 2021-11-26 | Stop reason: HOSPADM

## 2021-11-26 RX ORDER — ATROPA BELLADONNA AND OPIUM 16.2; 3 MG/1; MG/1
SUPPOSITORY RECTAL PRN
Status: DISCONTINUED | OUTPATIENT
Start: 2021-11-26 | End: 2021-11-26 | Stop reason: HOSPADM

## 2021-11-26 RX ORDER — CEFAZOLIN SODIUM 2 G/100ML
2 INJECTION, SOLUTION INTRAVENOUS SEE ADMIN INSTRUCTIONS
Status: DISCONTINUED | OUTPATIENT
Start: 2021-11-26 | End: 2021-11-26 | Stop reason: HOSPADM

## 2021-11-26 RX ORDER — KETAMINE HYDROCHLORIDE 50 MG/ML
INJECTION, SOLUTION INTRAMUSCULAR; INTRAVENOUS PRN
Status: DISCONTINUED | OUTPATIENT
Start: 2021-11-26 | End: 2021-11-26

## 2021-11-26 RX ORDER — HYDROCODONE BITARTRATE AND ACETAMINOPHEN 5; 325 MG/1; MG/1
1 TABLET ORAL ONCE
Status: DISCONTINUED | OUTPATIENT
Start: 2021-11-26 | End: 2021-11-26 | Stop reason: HOSPADM

## 2021-11-26 RX ORDER — PROPOFOL 10 MG/ML
INJECTION, EMULSION INTRAVENOUS PRN
Status: DISCONTINUED | OUTPATIENT
Start: 2021-11-26 | End: 2021-11-26

## 2021-11-26 RX ORDER — DEXAMETHASONE SODIUM PHOSPHATE 4 MG/ML
INJECTION, SOLUTION INTRA-ARTICULAR; INTRALESIONAL; INTRAMUSCULAR; INTRAVENOUS; SOFT TISSUE PRN
Status: DISCONTINUED | OUTPATIENT
Start: 2021-11-26 | End: 2021-11-26

## 2021-11-26 RX ORDER — NALOXONE HYDROCHLORIDE 0.4 MG/ML
0.2 INJECTION, SOLUTION INTRAMUSCULAR; INTRAVENOUS; SUBCUTANEOUS
Status: DISCONTINUED | OUTPATIENT
Start: 2021-11-26 | End: 2021-11-26 | Stop reason: HOSPADM

## 2021-11-26 RX ORDER — SODIUM CHLORIDE, SODIUM LACTATE, POTASSIUM CHLORIDE, CALCIUM CHLORIDE 600; 310; 30; 20 MG/100ML; MG/100ML; MG/100ML; MG/100ML
INJECTION, SOLUTION INTRAVENOUS CONTINUOUS
Status: DISCONTINUED | OUTPATIENT
Start: 2021-11-26 | End: 2021-11-26 | Stop reason: HOSPADM

## 2021-11-26 RX ORDER — LIDOCAINE 40 MG/G
CREAM TOPICAL
Status: DISCONTINUED | OUTPATIENT
Start: 2021-11-26 | End: 2021-11-26 | Stop reason: HOSPADM

## 2021-11-26 RX ORDER — HYDROCODONE BITARTRATE AND ACETAMINOPHEN 5; 325 MG/1; MG/1
1-2 TABLET ORAL EVERY 4 HOURS PRN
Qty: 10 TABLET | Refills: 0 | Status: SHIPPED | OUTPATIENT
Start: 2021-11-26 | End: 2021-12-08

## 2021-11-26 RX ORDER — CEFAZOLIN SODIUM 2 G/100ML
2 INJECTION, SOLUTION INTRAVENOUS
Status: DISCONTINUED | OUTPATIENT
Start: 2021-11-26 | End: 2021-11-26 | Stop reason: HOSPADM

## 2021-11-26 RX ORDER — SODIUM CHLORIDE, SODIUM LACTATE, POTASSIUM CHLORIDE, CALCIUM CHLORIDE 600; 310; 30; 20 MG/100ML; MG/100ML; MG/100ML; MG/100ML
INJECTION, SOLUTION INTRAVENOUS CONTINUOUS PRN
Status: DISCONTINUED | OUTPATIENT
Start: 2021-11-26 | End: 2021-11-26

## 2021-11-26 RX ORDER — ONDANSETRON 2 MG/ML
INJECTION INTRAMUSCULAR; INTRAVENOUS PRN
Status: DISCONTINUED | OUTPATIENT
Start: 2021-11-26 | End: 2021-11-26

## 2021-11-26 RX ORDER — LIDOCAINE HYDROCHLORIDE 10 MG/ML
INJECTION, SOLUTION INFILTRATION; PERINEURAL PRN
Status: DISCONTINUED | OUTPATIENT
Start: 2021-11-26 | End: 2021-11-26

## 2021-11-26 RX ORDER — FENTANYL CITRATE 50 UG/ML
INJECTION, SOLUTION INTRAMUSCULAR; INTRAVENOUS PRN
Status: DISCONTINUED | OUTPATIENT
Start: 2021-11-26 | End: 2021-11-26

## 2021-11-26 RX ORDER — HYDROMORPHONE HCL IN WATER/PF 6 MG/30 ML
0.2 PATIENT CONTROLLED ANALGESIA SYRINGE INTRAVENOUS EVERY 5 MIN PRN
Status: DISCONTINUED | OUTPATIENT
Start: 2021-11-26 | End: 2021-11-26 | Stop reason: HOSPADM

## 2021-11-26 RX ORDER — OXYCODONE HYDROCHLORIDE 5 MG/1
5 TABLET ORAL EVERY 4 HOURS PRN
Status: DISCONTINUED | OUTPATIENT
Start: 2021-11-26 | End: 2021-11-26 | Stop reason: HOSPADM

## 2021-11-26 RX ORDER — ONDANSETRON 2 MG/ML
4 INJECTION INTRAMUSCULAR; INTRAVENOUS EVERY 30 MIN PRN
Status: DISCONTINUED | OUTPATIENT
Start: 2021-11-26 | End: 2021-11-26 | Stop reason: HOSPADM

## 2021-11-26 RX ORDER — FENTANYL CITRATE 50 UG/ML
25 INJECTION, SOLUTION INTRAMUSCULAR; INTRAVENOUS
Status: DISCONTINUED | OUTPATIENT
Start: 2021-11-26 | End: 2021-11-26 | Stop reason: HOSPADM

## 2021-11-26 RX ADMIN — LIDOCAINE HYDROCHLORIDE 30 MG: 10 INJECTION, SOLUTION INFILTRATION; PERINEURAL at 07:42

## 2021-11-26 RX ADMIN — SODIUM CHLORIDE, POTASSIUM CHLORIDE, SODIUM LACTATE AND CALCIUM CHLORIDE: 600; 310; 30; 20 INJECTION, SOLUTION INTRAVENOUS at 07:17

## 2021-11-26 RX ADMIN — DEXAMETHASONE SODIUM PHOSPHATE 10 MG: 4 INJECTION, SOLUTION INTRA-ARTICULAR; INTRALESIONAL; INTRAMUSCULAR; INTRAVENOUS; SOFT TISSUE at 07:42

## 2021-11-26 RX ADMIN — ONDANSETRON 4 MG: 2 INJECTION INTRAMUSCULAR; INTRAVENOUS at 07:52

## 2021-11-26 RX ADMIN — KETAMINE HYDROCHLORIDE 50 MG: 50 INJECTION, SOLUTION INTRAMUSCULAR; INTRAVENOUS at 07:42

## 2021-11-26 RX ADMIN — FENTANYL CITRATE 100 MCG: 50 INJECTION, SOLUTION INTRAMUSCULAR; INTRAVENOUS at 07:42

## 2021-11-26 RX ADMIN — CEFAZOLIN SODIUM 2 G: 2 INJECTION, SOLUTION INTRAVENOUS at 07:36

## 2021-11-26 RX ADMIN — MIDAZOLAM HYDROCHLORIDE 2 MG: 1 INJECTION, SOLUTION INTRAMUSCULAR; INTRAVENOUS at 07:32

## 2021-11-26 RX ADMIN — SODIUM CHLORIDE, POTASSIUM CHLORIDE, SODIUM LACTATE AND CALCIUM CHLORIDE: 600; 310; 30; 20 INJECTION, SOLUTION INTRAVENOUS at 07:31

## 2021-11-26 RX ADMIN — PROPOFOL 200 MG: 10 INJECTION, EMULSION INTRAVENOUS at 07:42

## 2021-11-26 NOTE — ANESTHESIA POSTPROCEDURE EVALUATION
Patient: Damien Dacosta Jr.    Procedure: Procedure(s):  CYSTOSCOPY WITH QUANTA LASER TRANSURETHRAL INCISION OF THE PROSTATE  CYSTOLITHOLAPAXY       Diagnosis:Lower urinary tract symptoms due to benign prostatic hyperplasia [N40.1]  Diagnosis Additional Information: No value filed.    Anesthesia Type:  General    Note:  Disposition: Outpatient   Postop Pain Control: Uneventful            Sign Out: Well controlled pain   PONV: No   Neuro/Psych: Uneventful            Sign Out: Acceptable/Baseline neuro status   Airway/Respiratory: Uneventful            Sign Out: Acceptable/Baseline resp. status   CV/Hemodynamics: Uneventful            Sign Out: Acceptable CV status; No obvious hypovolemia; No obvious fluid overload   Other NRE: NONE   DID A NON-ROUTINE EVENT OCCUR?            Last vitals:  Vitals Value Taken Time   /65 11/26/21 1000   Temp 36.3  C (97.4  F) 11/26/21 0950   Pulse 45 11/26/21 1003   Resp 12 11/26/21 1003   SpO2 100 % 11/26/21 1003   Vitals shown include unvalidated device data.    Electronically Signed By: Michael Rangel MD  November 26, 2021  10:06 AM

## 2021-11-26 NOTE — ANESTHESIA PREPROCEDURE EVALUATION
Anesthesia Pre-Procedure Evaluation    Patient: Damien Dacosta Jr.   MRN: [id number] : 1955        Preoperative Diagnosis: Lower urinary tract symptoms due to benign prostatic hyperplasia [N40.1]    Procedure : Procedure(s):  CYSTOSCOPY WITH QUANTA LASER TRANSURETHRAL INCISION OF THE PROSTATE  CYSTOLITHOLAPAXY          Past Medical History:   Diagnosis Date     Arthritis     osteoarthrosis of lower keg     Diabetes (H)     type 2     Hyperlipidemia       Past Surgical History:   Procedure Laterality Date     ARTHROPLASTY KNEE Right 2016    Procedure: ARTHROPLASTY KNEE;  Surgeon: Tommie Gramajo MD;  Location:  OR     ARTHROPLASTY KNEE Left 03/15/2018    Procedure: ARTHROPLASTY KNEE;  LEFT TOTAL KNEE ARTHROPLASTY;  Surgeon: Tommie Gramajo MD;  Location:  OR     COLONOSCOPY       ENT SURGERY      tonsillectomy     GENITOURINARY SURGERY      laser TURP     ORTHOPEDIC SURGERY Bilateral     knee arthroscopy      Allergies   Allergen Reactions     Shellfish Allergy Anaphylaxis      Social History     Tobacco Use     Smoking status: Former Smoker     Types: Cigars, Dip, chew, snus or snuff     Smokeless tobacco: Current User     Types: Chew     Tobacco comment: quit cigs 20 years ago   Substance Use Topics     Alcohol use: Yes     Comment: 2-4/ week      Wt Readings from Last 1 Encounters:   21 108.6 kg (239 lb 6.4 oz)        Anesthesia Evaluation   Pt has not had prior anesthetic         ROS/MED HX  ENT/Pulmonary:  - neg pulmonary ROS     Neurologic:  - neg neurologic ROS     Cardiovascular:  - neg cardiovascular ROS     METS/Exercise Tolerance: >4 METS    Hematologic:  - neg hematologic  ROS     Musculoskeletal:  - neg musculoskeletal ROS     GI/Hepatic:  - neg GI/hepatic ROS     Renal/Genitourinary:     (+) renal disease,     Endo:  - neg endo ROS   (+) type II DM, Diabetic complications: neuropathy.  (-) Type I DM   Psychiatric/Substance Use:  - neg psychiatric ROS     Infectious  Disease:  - neg infectious disease ROS     Malignancy:  - neg malignancy ROS     Other:  - neg other ROS          Physical Exam    Airway  airway exam normal      Mallampati: II   TM distance: > 3 FB   Neck ROM: full   Mouth opening: > 3 cm    Respiratory Devices and Support         Dental  no notable dental history         Cardiovascular   cardiovascular exam normal          Pulmonary   pulmonary exam normal                OUTSIDE LABS:  CBC:   Lab Results   Component Value Date    WBC 7.4 09/19/2010    HGB 10.4 (L) 03/17/2018    HGB 11.5 (L) 03/16/2018    HCT 36.4 (L) 09/19/2010     03/15/2018     09/23/2016     BMP:   Lab Results   Component Value Date     09/19/2010    POTASSIUM 4.6 03/15/2018    POTASSIUM 4.2 09/20/2016    CHLORIDE 101 09/19/2010    CO2 28 09/19/2010    BUN 20 09/19/2010    CR 0.88 03/15/2018    CR 1.02 09/23/2016     (H) 11/26/2021     (H) 03/16/2018     COAGS: No results found for: PTT, INR, FIBR  POC:   Lab Results   Component Value Date     (H) 03/15/2018     HEPATIC: No results found for: ALBUMIN, PROTTOTAL, ALT, AST, GGT, ALKPHOS, BILITOTAL, BILIDIRECT, MILTON  OTHER:   Lab Results   Component Value Date    A1C 8.1@ 11/24/2009    CAITLYN 8.8 09/19/2010       Anesthesia Plan    ASA Status:  2   NPO Status:  NPO Appropriate    Anesthesia Type: General.     - Airway: LMA   Induction: Intravenous, Propofol.   Maintenance: Balanced.        Consents    Anesthesia Plan(s) and associated risks, benefits, and realistic alternatives discussed. Questions answered and patient/representative(s) expressed understanding.    - Discussed:     - Discussed with:  Patient, Spouse      - Extended Intubation/Ventilatory Support Discussed: No.      - Patient is DNR/DNI Status: No    Use of blood products discussed: No .     Postoperative Care    Pain management: IV analgesics, Multi-modal analgesia.   PONV prophylaxis: Ondansetron (or other 5HT-3), Dexamethasone or  Solumedrol, Droperidol or Haldol     Comments:    Other Comments: 50 mg ketamine IV on induction.            Michael Rangel MD

## 2021-11-26 NOTE — ANESTHESIA CARE TRANSFER NOTE
Patient: Damien Dacosta Jr.    Procedure: Procedure(s):  CYSTOSCOPY WITH QUANTA LASER TRANSURETHRAL INCISION OF THE PROSTATE  CYSTOLITHOLAPAXY       Diagnosis: Lower urinary tract symptoms due to benign prostatic hyperplasia [N40.1]  Diagnosis Additional Information: No value filed.    Anesthesia Type:   General     Note:    Oropharynx: oropharynx clear of all foreign objects and spontaneously breathing  Level of Consciousness: awake  Oxygen Supplementation: face mask  Level of Supplemental Oxygen (L/min / FiO2): 8  Independent Airway: airway patency satisfactory and stable  Dentition: dentition unchanged  Vital Signs Stable: post-procedure vital signs reviewed and stable  Report to RN Given: handoff report given  Patient transferred to: PACU    Handoff Report: Identifed the Patient, Identified the Reponsible Provider, Reviewed the pertinent medical history, Discussed the surgical course, Reviewed Intra-OP anesthesia mangement and issues during anesthesia, Set expectations for post-procedure period and Allowed opportunity for questions and acknowledgement of understanding      Vitals:  Vitals Value Taken Time   BP     Temp     Pulse     Resp     SpO2         Electronically Signed By: LINN Coronado CRNA  November 26, 2021  9:13 AM

## 2021-11-26 NOTE — OP NOTE
OPERATIVE NOTE    Name:  Damien Dacosta Jr.  PCP:  Rosalva Lweis  Procedure Date:  11/26/2021  Location: [facility] [place] OR      Cystoscopy laser and lithoclast the bladder stone, and laser TURP and bladder neck contracture    Pre-Procedure Diagnosis:  Lower urinary tract symptoms due to benign prostatic hyperplasia [N40.1]     Post-Procedure Diagnosis:    Benign prostatic hyperplasia with lower urinary tract symptoms [N40.1]    Surgeon(s):  Arsh Herbert MD    Anesthesia Type:  General    Findings:  benign prostatic hypertrophy     Operative Report:    After obtaining satisfactory anesthesia, patient was placed in the dorsal lithotomy position.  Cystoscope was introduced into the bladder.  Bladder was inspected with both a 30 and 70  lens.  Large wally stone stone noted.  Stones greater than 3 cm.  Using the laser and the lithoclast the stone was broken up into several small fragments.  Urovac used to remove the chips.  Upon reinspection no further stones seen.  .  Scope was pulled back into the prosthetic urethra.  He was noted to have significant bladder neck contracture and some benign prostatic hypertrophy.  Using the Quanta laser, the lobes of the prostate were taken down to the prostate capsule between the bladder neck and verumontanum.  Prostatic urethra opened up very nicely.  Excellent hemostasis was obtained.  A 20 Hungarian catheter was placed at the completion of the case.  He was brought to the recovery room in stable condition.  Number of joules used = [id number]    Estimated Blood Loss:   [unfilled]    Specimens:    ID Type Source Tests Collected by Time Destination   A : Bladder Stone Calculus/Stone Urinary Bladder STONE ANALYSIS Arsh Herbert MD 11/26/2021  8:42 AM        Drains:   Urethral Catheter Triple-lumen;Latex 20 fr (Active)   Collection Container Other (Comment) 11/26/21 0915   Securement Method Leg strap 11/26/21 1016   Rationale for Continued Use /GI/GYN Pelvic Procedure 11/26/21  1016       Complications:    None        RO SALAZAR MD   [facility]   Date: 11/26/2021  Time: 10:48 AM

## 2021-11-26 NOTE — ANESTHESIA PROCEDURE NOTES
Airway       Patient location during procedure: OR  Staff -        CRNA: Freddie Rooney APRN CRNA       Performed By: CRNA  Consent for Airway        Urgency: elective  Indications and Patient Condition       Indications for airway management: glenn-procedural       Induction type:intravenous       Mask difficulty assessment: 0 - not attempted    Final Airway Details       Final airway type: supraglottic airway    Supraglottic Airway Details        Type: LMA       Brand: Ambu AuraGain       LMA size: 5    Post intubation assessment        Placement verified by: capnometry, equal breath sounds and chest rise        Number of attempts at approach: 1       Number of other approaches attempted: 0       Secured with: silk tape       Ease of procedure: easy       Dentition: Intact and Unchanged

## 2021-11-27 PROBLEM — E78.5 HYPERLIPIDEMIA: Status: ACTIVE | Noted: 2021-10-20

## 2021-11-27 PROBLEM — R35.0 INCREASED FREQUENCY OF URINATION: Status: ACTIVE | Noted: 2021-10-20

## 2021-11-27 PROBLEM — G63 POLYNEUROPATHY ASSOCIATED WITH UNDERLYING DISEASE (H): Status: ACTIVE | Noted: 2021-04-03

## 2021-11-27 PROBLEM — E11.9 DIABETES MELLITUS (H): Status: ACTIVE | Noted: 2021-10-20

## 2021-11-27 RX ORDER — SULFAMETHOXAZOLE/TRIMETHOPRIM 800-160 MG
1 TABLET ORAL 2 TIMES DAILY
COMMUNITY
Start: 2021-11-27 | End: 2021-12-10

## 2021-11-27 RX ORDER — TADALAFIL 2.5 MG/1
TABLET ORAL
COMMUNITY
End: 2021-12-08

## 2021-11-27 RX ORDER — GABAPENTIN 300 MG/1
CAPSULE ORAL
COMMUNITY
Start: 2021-11-27

## 2021-11-27 RX ORDER — SULFAMETHOXAZOLE/TRIMETHOPRIM 800-160 MG
TABLET ORAL
COMMUNITY
End: 2021-11-27

## 2021-11-27 RX ORDER — GABAPENTIN 300 MG/1
CAPSULE ORAL
COMMUNITY
End: 2021-11-28

## 2021-11-28 NOTE — PROGRESS NOTES
Diagnosis/Summary/Recommendations:    PATIENT: Damien Dacosta Jr.  66 year old male     : 1955    VALERIANO: December 10, 2021    MRN: [id number]    [address] [facility] [address]   [email or web address]  [phone] (M)   [phone] (H)   [phone] (W)   María Dacosta  [id number]    He is on propranolol and his heart rate is 50 and he has not noticed much benefit. He may want to stop the daily use of propranolol due to its limited benefit and use it as needed. I am not sure we can increase the dose of this medication further given his low heart rate. He had been seen by a neurologist in the past: Dr. Gabriella Elena      Discussed topiramate/topamax and primidone/mysoline and discussed having Joaquina Melgar's input from pharmacy     Discussed occupational therapy - and made referral     Discussed deep brain stimulation (DBS)    [email or web address]      Assessment:    (G25.9) Movement disorder  (primary encounter diagnosis)  Last seen 2021    (R25.1) Tremor  Tremor - diagnoses of essential tremor.   Propranolol inderal 20mg twice daily  - stopped  Started on propranolol in 2020  Tremor duration 3 years  Right handed  Bilateral hand tremor  Handwriting is affected. shakey  Utensils - difficulties with fork   May need two hands to hold a cup  He has not dropped things but has significant shaking.   He has a bit of head tremor.   Alleviating factors - no clear - not sure if alcohol helps.   Tremor is better in the morning and increased after working out.   Has had problems with putting - short puts.      (G62.9) Neuropathy  Gabapentin neurontin 300mg x4 at night.   For neuropathy  May have some creepy crawling leg feelings   The gabapentin is helpful for his neuropathy   If he forgets to take it he cannot fall asleep.    Review of diagnosis    Essential tremor     Avoidance of dopamine blockers   Not taking    Motor complication review   n/a    Review of Impulse  control disorders     Review of surgical or medication options     Have not been exercising - had a catheter in October 2021 for bladder stone    Gait/Balance/Falls   No falls    Exercise/Therapy performed/offered   Not presently exercising   he is rowing and TRX and when warmer he golfs    Cognitive/Driving   stable    Mood   stable    Hallucinations/delusions   denies    Sleep  - able to fall asleep relatively quickly within 15-20 minutes and sleeps through the night.   He gets up to urinate once nightly  Snores a bit  No loss of smell  No talking in his sleep.      Bladder  - he has been diagnosed with bph - shaved the prostate  Bladder stone on 11/26/2021  Not sure as to etiology of bladder stone but presumed not linked to increased risk of kidney stone  He had procedure to alleviate the altered opening for his urinary problems.      GI/Constipation/GERD  Omeprazole prilosec 20mg  Senna-docusate senokot-S 8.6-50     ENDO  Atorvastatin lipitor 40mg in am   Metformin glucophage 500mg  In am and 2 in pmm. Has 5.8 a1c and 130s   Has a bit of neuropathy. No renal or eye problems  Has some involvement in the bottom of his feet.   a1c 5.9 on 11/18/2021     Cardio/heart - typically okay   Pulse was 42  bp was 122/60  ECG 11/26/2021  Last stress echocardiogram - 10/21/2014     Vision  - denies      Heme  Aspirin 325mg - not taking  B12 methylcobalamin      Other:  Acetaminophen tylenol 325mg as needed   Oxycodone IR roxicodone 5mg - not taking      Amoxicillin amoxil 500mg prior to dental     Medications     8am   8pm   Acetaminophen tylenol 325mg prn       Amoxicillin amoxil 500mg  dental        Aspirin 325mg stopped       Atorvastatin lipitor 40mg  1       Gabapentin neurontin 300mg     4   Hydrocodone-aceaminophen norco 5-325 prn     Magnesium citrate 150mg 2     Metformin glucophage 500mg 1   2   MVI 1       Nonformularly Vit B12 100mcg, vit b6 46mg, 400 mcg folate 1     Propranolol inderal 20mg  stopped      Senna-docusate senokot-S 8.6-50 1   1   Tadalafil cialis 5mg prn       Tamsulosin flomax 0.4mg     1    Vitamin D3 cholecalciferol 50 mcg (2000u) 1-2     Zinc 25mg 2                Plan:    Discussion about a trial of topiramate (topamax) starting with 25mg daily and then increased based on tolerability as well as benefit.   Visit with Joaquina Antonio, Pharmacy consultation - had seen her back in march 2021    Discussed pros and cons of other medications   Has low heart rate so propranolol may not be best  Primidone/mysoline  Has cognitive and balance risks    Not ready for deep brain stimulation (DBS) - will have him sent links     Lionel Trio wearable -     Future clinical trials of wearables and medications.     He has [employer]    Sent a escript to his [facility] pharmacy         Coding statement:   Medical Decision Making:  #  Chronic progressive medical conditions addressed  - see above --   Review and/or interpretation of unique test or documentation from a provider outside of neurology yes   Independent historian provided additional details  no  Prescription drug management and review of potential side effects and/or monitoring for side effects  -- see above ---  Health impacted by social determinants of health  no    I have reviewed the note as documented above.  This accurately captures the substance of my conversation with the patient and total time spent preparing for visit, executing visit and completing visit on the day of the visit:  40 minutes.  The portion of this total time included face to face time 145pm - 222pm     Jonathan Delgado MD     ______________________________________    Last visit date and details:     Answers for HPI/ROS submitted by the patient on 11/30/2021  General Symptoms: No  Skin Symptoms: No  HENT Symptoms: No  EYE SYMPTOMS: No  HEART SYMPTOMS: No  LUNG SYMPTOMS: No  INTESTINAL SYMPTOMS: No  URINARY SYMPTOMS: Yes  REPRODUCTIVE SYMPTOMS: No  SKELETAL SYMPTOMS: No  BLOOD SYMPTOMS:  No  NERVOUS SYSTEM SYMPTOMS: No  MENTAL HEALTH SYMPTOMS: No  Trouble holding urine or incontinence: Yes  Pain or burning: Yes  Trouble starting or stopping: Yes  Increased frequency of urination: Yes  Blood in urine: Yes  Decreased frequency of urination: No  Frequent nighttime urination: Yes  Flank pain: No  Difficulty emptying bladder: Yes            ______________________________________      Patient was asked about 14 Review of systems including changes in vision (dry eyes, double vision), hearing, heart, lungs, musculoskeletal, depression, anxiety, snoring, RBD, insomnia, urinary frequency, urinary urgency, constipation, swallowing problems, hematological, ID, allergies, skin problems: seborrhea, endocrinological: thyroid, diabetes, cholesterol; balance, weight changes, and other neurological problems and these were not significant at this time except for   Patient Active Problem List   Diagnosis     Numbness and tingling in left arm     Musculoskeletal disorder involving upper trapezius muscle     Right knee pain     Left knee pain     S/P total knee replacement using cement, right     Acute pain of right knee     Knee effusion, right     S/P total knee replacement using cement, left     Benign prostatic hyperplasia with urinary frequency     Family history of cardiovascular disease     Routine general medical examination at a health care facility     Type 2 diabetes, controlled, with peripheral neuropathy (H)     Hyperlipidemia     Polyneuropathy associated with underlying disease (H)     Increased frequency of urination     Diabetes mellitus (H)          Allergies   Allergen Reactions     Shellfish Allergy Anaphylaxis and Other (See Comments)     Past Surgical History:   Procedure Laterality Date     ARTHROPLASTY KNEE Right 09/20/2016    Procedure: ARTHROPLASTY KNEE;  Surgeon: Tommie Gramajo MD;  Location:  OR     ARTHROPLASTY KNEE Left 03/15/2018    Procedure: ARTHROPLASTY KNEE;  LEFT TOTAL KNEE  ARTHROPLASTY;  Surgeon: Tommie Gramajo MD;  Location: [facility] OR     COLONOSCOPY       ENT SURGERY      tonsillectomy     GENITOURINARY SURGERY  2012    laser TURP     ORTHOPEDIC SURGERY Bilateral     knee arthroscopy     Past Medical History:   Diagnosis Date     Arthritis     osteoarthrosis of lower keg     Diabetes (H)     type 2     Hyperlipidemia      Social History     Socioeconomic History     Marital status:      Spouse name: Not on file     Number of children: Not on file     Years of education: Not on file     Highest education level: Not on file   Occupational History     Not on file   Tobacco Use     Smoking status: Former Smoker     Types: Cigars, Dip, chew, snus or snuff     Smokeless tobacco: Current User     Types: Chew     Tobacco comment: quit cigs 20 years ago   Substance and Sexual Activity     Alcohol use: Yes     Comment: 2-4/ week     Drug use: No     Sexual activity: Not on file   Other Topics Concern     Parent/sibling w/ CABG, MI or angioplasty before 65F 55M? 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        Wife is extended furlough -         3 kids - 2 sons and 1 daughter     Social Determinants of Health     Financial Resource Strain: Not on file   Food Insecurity: Not on file   Transportation Needs: Not on file   Physical Activity: Not on file   Stress: Not on file   Social Connections: Not on file   Intimate Partner Violence: Not on file   Housing Stability: Not on file       Drug and lactation database from the [place] National Library of Medicine:  http://toxnet.nlm.nih.gov/cgi-bin/sis/htmlgen?LACT      B/P: Data Unavailable, T: Data Unavailable, P: Data Unavailable, R: Data Unavailable 0 lbs 0 oz  There were no vitals taken for this visit., There is no height or weight on file to calculate BMI.  Medications and Vitals not listed above were documented in the cart and reviewed by me.     Current Outpatient Medications   Medication  Sig Dispense Refill     gabapentin (NEURONTIN) 300 MG capsule 4 x 300mg capsules (1200mg) by mouth at bedtime       metFORMIN (GLUCOPHAGE) 500 MG tablet TAKE 1 TABLET EVERY MORNING AND 2 TABLETS AT BEDTIME       sulfamethoxazole-trimethoprim (BACTRIM DS) 800-160 MG tablet Take 1 tablet by mouth 2 times daily       acetaminophen (TYLENOL) 325 MG tablet Take 2 tablets (650 mg) by mouth every 4 hours as needed for mild pain 60 tablet 0     amoxicillin (AMOXIL) 500 MG tablet Take 4 tabs oral 1 hour before dental work       ASCENSIA CONTOUR TEST VI None Entered       atorvastatin (LIPITOR) 40 MG tablet Take 1 tablet (40 mg) by mouth daily       B-12 METHYLCOBALAMIN PO Take 1 tablet by mouth daily       cephALEXin (KEFLEX) 500 MG capsule Take 1 capsule (500 mg) by mouth 2 times daily for 3 days 6 capsule 0     gabapentin (NEURONTIN) 300 MG capsule 300mg 4/day       HYDROcodone-acetaminophen (NORCO) 5-325 MG tablet Take 1-2 tablets by mouth every 4 hours as needed for severe pain (Moderate to Severe Pain) 10 tablet 0     MAGNESIUM GLYCINATE PO Take 2 tablets by mouth daily       multivitamin (ONE-DAILY) tablet Take 1 tablet by mouth daily       tadalafil (CIALIS) 2.5 MG tablet tadalafil 2.5 mg tablet   5mg 1/day       tadalafil (CIALIS) 5 MG tablet Take 1 tablet (5 mg) by mouth daily as needed (erectile dysfunction)       tamsulosin (FLOMAX) 0.4 MG capsule Take 0.4 mg by mouth every evening           Medications                                                                                                                                                  Jonathan Delgado MD

## 2021-11-30 LAB
APPEARANCE STONE: NORMAL
COMPN STONE: NORMAL
SPECIMEN WT: 4377 MG

## 2021-11-30 ASSESSMENT — ENCOUNTER SYMPTOMS
DIFFICULTY URINATING: 1
HEMATURIA: 1
FLANK PAIN: 0
DYSURIA: 1

## 2021-12-10 ENCOUNTER — VIRTUAL VISIT (OUTPATIENT)
Dept: NEUROLOGY | Facility: CLINIC | Age: 66
End: 2021-12-10
Payer: COMMERCIAL

## 2021-12-10 DIAGNOSIS — R25.1 TREMOR: ICD-10-CM

## 2021-12-10 DIAGNOSIS — G25.9 MOVEMENT DISORDER: Primary | ICD-10-CM

## 2021-12-10 DIAGNOSIS — G62.9 NEUROPATHY: ICD-10-CM

## 2021-12-10 PROCEDURE — 99215 OFFICE O/P EST HI 40 MIN: CPT | Mod: 95 | Performed by: PSYCHIATRY & NEUROLOGY

## 2021-12-10 RX ORDER — CHOLECALCIFEROL (VITAMIN D3) 50 MCG
2 TABLET ORAL DAILY
COMMUNITY
End: 2023-05-02

## 2021-12-10 RX ORDER — TOPIRAMATE 25 MG/1
TABLET, FILM COATED ORAL
Qty: 60 TABLET | Refills: 11 | Status: SHIPPED | OUTPATIENT
Start: 2021-12-10 | End: 2021-12-21

## 2021-12-10 NOTE — LETTER
12/10/2021       RE: Damien Dacosta Jr.  [address] [facility] [address]     Dear Colleague,    Thank you for referring your patient, Damein Dacosta Jr., to the [facility]UROLOG[facility]IC [place] at [facility]. Please see a copy of my visit note below.        Diagnosis/Summary/Recommendations:    PATIENT: Damien Dacosta Jr.  66 year old male     : 1955    VALERIANO: December 10, 2021    MRN: [id number]    [address] [facility] [address]   [email or web address]  [phone] (M)   [phone] (H)   [phone] (W)   María Dacosta  [id number]    He is on propranolol and his heart rate is 50 and he has not noticed much benefit. He may want to stop the daily use of propranolol due to its limited benefit and use it as needed. I am not sure we can increase the dose of this medication further given his low heart rate. He had been seen by a neurologist in the past: Dr. Gabriella Elena      Discussed topiramate/topamax and primidone/mysoline and discussed having Joaquina Melgar's input from pharmacy     Discussed occupational therapy - and made referral     Discussed deep brain stimulation (DBS)    [email or web address]      Assessment:    (G25.9) Movement disorder  (primary encounter diagnosis)  Last seen 2021    (R25.1) Tremor  Tremor - diagnoses of essential tremor.   Propranolol inderal 20mg twice daily  - stopped  Started on propranolol in 2020  Tremor duration 3 years  Right handed  Bilateral hand tremor  Handwriting is affected. shakey  Utensils - difficulties with fork   May need two hands to hold a cup  He has not dropped things but has significant shaking.   He has a bit of head tremor.   Alleviating factors - no clear - not sure if alcohol helps.   Tremor is better in the morning and increased after working out.   Has had problems with putting - short puts.      (G62.9) Neuropathy  Gabapentin neurontin  300mg x4 at night.   For neuropathy  May have some creepy crawling leg feelings   The gabapentin is helpful for his neuropathy   If he forgets to take it he cannot fall asleep.    Review of diagnosis    Essential tremor     Avoidance of dopamine blockers   Not taking    Motor complication review   n/a    Review of Impulse control disorders     Review of surgical or medication options     Have not been exercising - had a catheter in October 2021 for bladder stone    Gait/Balance/Falls   No falls    Exercise/Therapy performed/offered   Not presently exercising   he is rowing and TRX and when warmer he golfs    Cognitive/Driving   stable    Mood   stable    Hallucinations/delusions   denies    Sleep  - able to fall asleep relatively quickly within 15-20 minutes and sleeps through the night.   He gets up to urinate once nightly  Snores a bit  No loss of smell  No talking in his sleep.      Bladder  - he has been diagnosed with bph - shaved the prostate  Bladder stone on 11/26/2021  Not sure as to etiology of bladder stone but presumed not linked to increased risk of kidney stone  He had procedure to alleviate the altered opening for his urinary problems.      GI/Constipation/GERD  Omeprazole prilosec 20mg  Senna-docusate senokot-S 8.6-50     ENDO  Atorvastatin lipitor 40mg in am   Metformin glucophage 500mg  In am and 2 in pmm. Has 5.8 a1c and 130s   Has a bit of neuropathy. No renal or eye problems  Has some involvement in the bottom of his feet.   a1c 5.9 on 11/18/2021     Cardio/heart - typically okay   Pulse was 42  bp was 122/60  ECG 11/26/2021  Last stress echocardiogram - 10/21/2014     Vision  - denies      Heme  Aspirin 325mg - not taking  B12 methylcobalamin      Other:  Acetaminophen tylenol 325mg as needed   Oxycodone IR roxicodone 5mg - not taking      Amoxicillin amoxil 500mg prior to dental     Medications     8am   8pm   Acetaminophen tylenol 325mg prn       Amoxicillin amoxil 500mg  dental         Aspirin 325mg stopped       Atorvastatin lipitor 40mg  1       Gabapentin neurontin 300mg     4   Hydrocodone-aceaminophen norco 5-325 prn     Magnesium citrate 150mg 2     Metformin glucophage 500mg 1   2   MVI 1       Nonformularly Vit B12 100mcg, vit b6 46mg, 400 mcg folate 1     Propranolol inderal 20mg  stopped     Senna-docusate senokot-S 8.6-50 1   1   Tadalafil cialis 5mg prn       Tamsulosin flomax 0.4mg     1    Vitamin D3 cholecalciferol 50 mcg (2000u) 1-2     Zinc 25mg 2                Plan:    Discussion about a trial of topiramate (topamax) starting with 25mg daily and then increased based on tolerability as well as benefit.   Visit with Joaquina Alvarez, Pharmacy consultation - had seen her back in march 2021    Discussed pros and cons of other medications   Has low heart rate so propranolol may not be best  Primidone/mysoline  Has cognitive and balance risks    Not ready for deep brain stimulation (DBS) - will have him sent links     Lionel Trio wearable -     Future clinical trials of wearables and medications.     He has [employer]    Sent a escript to his [facility] pharmacy         Coding statement:   Medical Decision Making:  #  Chronic progressive medical conditions addressed  - see above --   Review and/or interpretation of unique test or documentation from a provider outside of neurology yes   Independent historian provided additional details  no  Prescription drug management and review of potential side effects and/or monitoring for side effects  -- see above ---  Health impacted by social determinants of health  no    I have reviewed the note as documented above.  This accurately captures the substance of my conversation with the patient and total time spent preparing for visit, executing visit and completing visit on the day of the visit:  40 minutes.  The portion of this total time included face to face time 145pm - 222pm     Jonathan Delgado MD     ______________________________________    Last visit  date and details:     Answers for HPI/ROS submitted by the patient on 11/30/2021  General Symptoms: No  Skin Symptoms: No  HENT Symptoms: No  EYE SYMPTOMS: No  HEART SYMPTOMS: No  LUNG SYMPTOMS: No  INTESTINAL SYMPTOMS: No  URINARY SYMPTOMS: Yes  REPRODUCTIVE SYMPTOMS: No  SKELETAL SYMPTOMS: No  BLOOD SYMPTOMS: No  NERVOUS SYSTEM SYMPTOMS: No  MENTAL HEALTH SYMPTOMS: No  Trouble holding urine or incontinence: Yes  Pain or burning: Yes  Trouble starting or stopping: Yes  Increased frequency of urination: Yes  Blood in urine: Yes  Decreased frequency of urination: No  Frequent nighttime urination: Yes  Flank pain: No  Difficulty emptying bladder: Yes            ______________________________________      Patient was asked about 14 Review of systems including changes in vision (dry eyes, double vision), hearing, heart, lungs, musculoskeletal, depression, anxiety, snoring, RBD, insomnia, urinary frequency, urinary urgency, constipation, swallowing problems, hematological, ID, allergies, skin problems: seborrhea, endocrinological: thyroid, diabetes, cholesterol; balance, weight changes, and other neurological problems and these were not significant at this time except for   Patient Active Problem List   Diagnosis     Numbness and tingling in left arm     Musculoskeletal disorder involving upper trapezius muscle     Right knee pain     Left knee pain     S/P total knee replacement using cement, right     Acute pain of right knee     Knee effusion, right     S/P total knee replacement using cement, left     Benign prostatic hyperplasia with urinary frequency     Family history of cardiovascular disease     Routine general medical examination at a health care facility     Type 2 diabetes, controlled, with peripheral neuropathy (H)     Hyperlipidemia     Polyneuropathy associated with underlying disease (H)     Increased frequency of urination     Diabetes mellitus (H)          Allergies   Allergen Reactions     Shellfish  Allergy Anaphylaxis and Other (See Comments)     Past Surgical History:   Procedure Laterality Date     ARTHROPLASTY KNEE Right 09/20/2016    Procedure: ARTHROPLASTY KNEE;  Surgeon: Tommie Gramajo MD;  Location:  OR     ARTHROPLASTY KNEE Left 03/15/2018    Procedure: ARTHROPLASTY KNEE;  LEFT TOTAL KNEE ARTHROPLASTY;  Surgeon: Tommie Gramajo MD;  Location:  OR     COLONOSCOPY       ENT SURGERY      tonsillectomy     GENITOURINARY SURGERY  2012    laser TURP     ORTHOPEDIC SURGERY Bilateral     knee arthroscopy     Past Medical History:   Diagnosis Date     Arthritis     osteoarthrosis of lower keg     Diabetes (H)     type 2     Hyperlipidemia      Social History     Socioeconomic History     Marital status:      Spouse name: Not on file     Number of children: Not on file     Years of education: Not on file     Highest education level: Not on file   Occupational History     Not on file   Tobacco Use     Smoking status: Former Smoker     Types: Cigars, Dip, chew, snus or snuff     Smokeless tobacco: Current User     Types: Chew     Tobacco comment: quit cigs 20 years ago   Substance and Sexual Activity     Alcohol use: Yes     Comment: 2-4/ week     Drug use: No     Sexual activity: Not on file   Other Topics Concern     Parent/sibling w/ CABG, MI or angioplasty before 65F 55M? 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        Wife is extended furlough -         3 kids - 2 sons and 1 daughter     Social Determinants of Health     Financial Resource Strain: Not on file   Food Insecurity: Not on file   Transportation Needs: Not on file   Physical Activity: Not on file   Stress: Not on file   Social Connections: Not on file   Intimate Partner Violence: Not on file   Housing Stability: Not on file       Drug and lactation database from the [place] National Library of Medicine:  http://toxnet.nlm.nih.gov/cgi-bin/sis/htmlgen?LACT      B/P: Data Unavailable, T:  Data Unavailable, P: Data Unavailable, R: Data Unavailable 0 lbs 0 oz  There were no vitals taken for this visit., There is no height or weight on file to calculate BMI.  Medications and Vitals not listed above were documented in the cart and reviewed by me.     Current Outpatient Medications   Medication Sig Dispense Refill     gabapentin (NEURONTIN) 300 MG capsule 4 x 300mg capsules (1200mg) by mouth at bedtime       metFORMIN (GLUCOPHAGE) 500 MG tablet TAKE 1 TABLET EVERY MORNING AND 2 TABLETS AT BEDTIME       sulfamethoxazole-trimethoprim (BACTRIM DS) 800-160 MG tablet Take 1 tablet by mouth 2 times daily       acetaminophen (TYLENOL) 325 MG tablet Take 2 tablets (650 mg) by mouth every 4 hours as needed for mild pain 60 tablet 0     amoxicillin (AMOXIL) 500 MG tablet Take 4 tabs oral 1 hour before dental work       ASCENSIA CONTOUR TEST VI None Entered       atorvastatin (LIPITOR) 40 MG tablet Take 1 tablet (40 mg) by mouth daily       B-12 METHYLCOBALAMIN PO Take 1 tablet by mouth daily       cephALEXin (KEFLEX) 500 MG capsule Take 1 capsule (500 mg) by mouth 2 times daily for 3 days 6 capsule 0     gabapentin (NEURONTIN) 300 MG capsule 300mg 4/day       HYDROcodone-acetaminophen (NORCO) 5-325 MG tablet Take 1-2 tablets by mouth every 4 hours as needed for severe pain (Moderate to Severe Pain) 10 tablet 0     MAGNESIUM GLYCINATE PO Take 2 tablets by mouth daily       multivitamin (ONE-DAILY) tablet Take 1 tablet by mouth daily       tadalafil (CIALIS) 2.5 MG tablet tadalafil 2.5 mg tablet   5mg 1/day       tadalafil (CIALIS) 5 MG tablet Take 1 tablet (5 mg) by mouth daily as needed (erectile dysfunction)       tamsulosin (FLOMAX) 0.4 MG capsule Take 0.4 mg by mouth every evening           Medications                                                                                                                                                  Jonathan Hernandez is a 66 year old who is being evaluated via  a billable video visit.      How would you like to obtain your AVS? MyChart  If the video visit is dropped, the invitation should be resent by: Send to e-mail at: [email or web address]  Will anyone else be joining your video visit? No      Video Start Time:   Video-Visit Details    Type of service:  Video Visit    Video End Time:    Originating Location (pt. Location): Home    Distant Location (provider location):  [facility] NEUROLOGY [facility]     Platform used for Video Visit: [employer]yaWell          Again, thank you for allowing me to participate in the care of your patient.      Sincerely,    Jonathan Delgado MD

## 2021-12-10 NOTE — PROGRESS NOTES
David is a 66 year old who is being evaluated via a billable video visit.      How would you like to obtain your AVS? [email or web address]  If the video visit is dropped, the invitation should be resent by: Send to e-mail at: [email or web address]  Will anyone else be joining your video visit? No      Video Start Time:   Video-Visit Details    Type of service:  Video Visit    Video End Time:    Originating Location (pt. Location): Home    Distant Location (provider location):  [facility] [place]     Platform used for Video Visit: [employer]

## 2021-12-10 NOTE — PATIENT INSTRUCTIONS
Assessment:    (G25.9) Movement disorder  (primary encounter diagnosis)  Last seen march 2021    (R25.1) Tremor  Tremor - diagnoses of essential tremor.   Propranolol inderal 20mg twice daily  - stopped  Started on propranolol in December 2020  Tremor duration 3 years  Right handed  Bilateral hand tremor  Handwriting is affected. shakey  Utensils - difficulties with fork   May need two hands to hold a cup  He has not dropped things but has significant shaking.   He has a bit of head tremor.   Alleviating factors - no clear - not sure if alcohol helps.   Tremor is better in the morning and increased after working out.   Has had problems with putting - short puts.      (G62.9) Neuropathy  Gabapentin neurontin 300mg x4 at night.   For neuropathy  May have some creepy crawling leg feelings   The gabapentin is helpful for his neuropathy   If he forgets to take it he cannot fall asleep.    Review of diagnosis    Essential tremor     Avoidance of dopamine blockers   Not taking    Motor complication review   n/a    Review of Impulse control disorders     Review of surgical or medication options     Have not been exercising - had a catheter in October 2021 for bladder stone    Gait/Balance/Falls   No falls    Exercise/Therapy performed/offered   Not presently exercising   he is rowing and TRX and when warmer he golfs    Cognitive/Driving   stable    Mood   stable    Hallucinations/delusions   denies    Sleep  - able to fall asleep relatively quickly within 15-20 minutes and sleeps through the night.   He gets up to urinate once nightly  Snores a bit  No loss of smell  No talking in his sleep.      Bladder  - he has been diagnosed with bph - shaved the prostate  Bladder stone on 11/26/2021  Not sure as to etiology of bladder stone but presumed not linked to increased risk of kidney stone  He had procedure to alleviate the altered opening for his urinary problems.      GI/Constipation/GERD  Omeprazole prilosec  20mg  Senna-docusate senokot-S 8.6-50     ENDO  Atorvastatin lipitor 40mg in am   Metformin glucophage 500mg  In am and 2 in pmm. Has 5.8 a1c and 130s   Has a bit of neuropathy. No renal or eye problems  Has some involvement in the bottom of his feet.   a1c 5.9 on 11/18/2021     Cardio/heart - typically okay   Pulse was 42  bp was 122/60  ECG 11/26/2021  Last stress echocardiogram - 10/21/2014     Vision  - denies      Heme  Aspirin 325mg - not taking  B12 methylcobalamin      Other:  Acetaminophen tylenol 325mg as needed   Oxycodone IR roxicodone 5mg - not taking      Amoxicillin amoxil 500mg prior to dental     Medications     8am   8pm   Acetaminophen tylenol 325mg prn       Amoxicillin amoxil 500mg  dental        Aspirin 325mg stopped       Atorvastatin lipitor 40mg  1       Gabapentin neurontin 300mg     4   Hydrocodone-aceaminophen norco 5-325 prn     Magnesium citrate 150mg 2     Metformin glucophage 500mg 1   2   MVI 1       Nonformularly Vit B12 100mcg, vit b6 46mg, 400 mcg folate 1     Propranolol inderal 20mg  stopped     Senna-docusate senokot-S 8.6-50 1   1   Tadalafil cialis 5mg prn       Tamsulosin flomax 0.4mg     1    Vitamin D3 cholecalciferol 50 mcg (2000u) 1-2     Zinc 25mg 2                Plan:    Discussion about a trial of topiramate (topamax) starting with 25mg daily and then increased based on tolerability as well as benefit.   Visit with Joaquina Alvarez, Pharmacy consultation - had seen her back in march 2021    Discussed pros and cons of other medications   Has low heart rate so propranolol may not be best  Primidone/mysoline  Has cognitive and balance risks    Not ready for deep brain stimulation (DBS) - will have him sent links     Lionel Trio wearable -     Future clinical trials of wearables and medications.     He has Car reviews    Sent a escript to his [place] pharmacy

## 2021-12-16 ENCOUNTER — TELEPHONE (OUTPATIENT)
Dept: NEUROLOGY | Facility: CLINIC | Age: 66
End: 2021-12-16
Payer: COMMERCIAL

## 2021-12-16 NOTE — TELEPHONE ENCOUNTER
MTM referral from: [facility] visit (referral by provider)    MTM referral outreach attempt #2 on December 16, 2021 at 2:37 PM      Outcome: Patient not reachable after several attempts, will route to MTM Pharmacist/Provider as an FYI.  MT scheduling number is [phone].  Thank you for the referral.    Rudy Whitney, MTM coordinator

## 2021-12-21 ENCOUNTER — VIRTUAL VISIT (OUTPATIENT)
Dept: PHARMACY | Facility: CLINIC | Age: 66
End: 2021-12-21
Attending: PSYCHIATRY & NEUROLOGY
Payer: COMMERCIAL

## 2021-12-21 DIAGNOSIS — G62.9 NEUROPATHY: ICD-10-CM

## 2021-12-21 DIAGNOSIS — R25.1 TREMOR: Primary | ICD-10-CM

## 2021-12-21 PROCEDURE — 99607 MTMS BY PHARM ADDL 15 MIN: CPT | Performed by: PHARMACIST

## 2021-12-21 PROCEDURE — 99606 MTMS BY PHARM EST 15 MIN: CPT | Performed by: PHARMACIST

## 2021-12-21 RX ORDER — TOPIRAMATE 25 MG/1
TABLET, FILM COATED ORAL
Qty: 120 TABLET | Refills: 11 | Status: SHIPPED | OUTPATIENT
Start: 2021-12-21 | End: 2022-03-16

## 2021-12-21 NOTE — PROGRESS NOTES
Medication Therapy Management (MTM) Encounter    ASSESSMENT:                            Medication Adherence/Access: No issues identified    Essential tremor/neuropathy: patient seems to be tolerating topiramate relatively well so far. We discussed a slow titration of the drug to see if he gets more benefit with a higher dose. He will continue to increase the dose up to 100 mg by our next visit. We reviewed that the maximum dose is typically 400 mg/day and we can split the dose into 2 dosing times. Reviewed possible side effects like mental fogginess, sleepiness, and paresthesias. Discussed with Dr. Delgado and he agrees with the plan to titrate topiramate.    PLAN:                            1. Continue increasing topiramate as below:  1-2 weeks on 2 tablets nightly (50 mg/day)   1-2 weeks on 1 tablet AM/2 tablets nightly (75 mg/day)   1-2 weeks on 2 tablets twice daily (100 mg/day)     2. Monitor for possible side effects including mental fogginess, sleepiness, and tingling in your extremities     Follow-up: 1/19/22 at 9:30 am by phone      SUBJECTIVE/OBJECTIVE:                          Damien Dacosta is a 66 year old male called for a follow-up visit. He was referred to me from Dr. Delgado.  Today's visit is a follow-up MTM visit from 5/10/21     Reason for visit: follow up on topiramate     Allergies/ADRs: Reviewed in chart  Past Medical History: Reviewed in chart  Tobacco: He reports that he has quit smoking. His smoking use included cigars and dip, chew, snus or snuff. His smokeless tobacco use includes chew.  Alcohol: 1 cocktail 4 days/week before dinner - no clear effect on tremor    Medication Adherence/Access: no issues reported    Essential tremor/neuropathy: Currently taking gabapentin 1200 mg nightly and topiramate 50 mg nightly (a couple hours before bedtime). He started taking topiramate a couple weeks ago and increased the dose from 25 mg to 50 mg a couple days ago. Reports mild improvement in tremor so  far. Left hand is a little better than right hand. Tremor is most bothersome when he is eating. Reports he has noticed a little light-headedness when he stands up quickly but not when he is exercising (has been exercising more lately). The worst of the tremor is when he's eating. Left hand is a little better than the right hand.     Today's Vitals: There were no vitals taken for this visit.  ----------------    I spent 18 minutes with this patient today. All changes were made via verbal approval with Dr. Delgado. A copy of the visit note was provided to the patient's referring provider.    The patient was sent via [employer] a summary of these recommendations.     Joaquina Alvarez, Pharm.D.  Medication Therapy Management Pharmacist  [facility]    Telemedicine Visit Details  Type of service:  Telephone visit  Start Time: 11:04 AM  End Time: 11:23 AM  Originating Location (patient location): [place]  Distant Location (provider location):  [facility]OLOG[facility]     Medication Therapy Recommendations  Tremor    Current Medication: topiramate (TOPAMAX) 25 MG tablet   Rationale: Dose too low - Dosage too low - Effectiveness   Recommendation: Increase Dose   Status: Accepted per Provider

## 2021-12-21 NOTE — Clinical Note
Are you okay with me working with him to titrate topamax for his tremor? He is tolerating it well so far.

## 2021-12-21 NOTE — PATIENT INSTRUCTIONS
Recommendations from today's MTM visit:                                                      1. Continue increasing topiramate as below:  1-2 weeks on 2 tablets nightly (50 mg/day)   1-2 weeks on 1 tablet AM/2 tablets nightly (75 mg/day)   1-2 weeks on 2 tablets twice daily (100 mg/day)     2. Monitor for possible side effects including mental fogginess, sleepiness, and tingling in your extremities     Follow-up: 1/19/22 at 9:30 am by phone2    It was great to speak with you today.  I value your experience and would be very thankful for your time with providing feedback on our clinic survey. You may receive a survey via email or text message in the next few days.     To schedule another MTM appointment, please call the clinic directly or you may call the MTM scheduling line at [phone] or toll-free at [phone].     My Clinical Pharmacist's contact information:                                                      Please feel free to contact me with any questions or concerns you have.      Joaquina Alvarez, Pharm.D.  Medication Therapy Management Pharmacist  [facility]

## 2021-12-23 NOTE — PROGRESS NOTES
Addended by: FLIP VIRK on: 12/23/2021 12:54 PM     Modules accepted: Level of Service     Patient refused capnography @ HS.  Patient educated on purpose of capnography.  Patient on pulse oximetry for bedtime.

## 2022-01-07 ENCOUNTER — TRANSFERRED RECORDS (OUTPATIENT)
Dept: HEALTH INFORMATION MANAGEMENT | Facility: CLINIC | Age: 67
End: 2022-01-07

## 2022-01-07 LAB
ALBUMIN (URINE) MG/SPEC: 228.5 MG/L
ALBUMIN/CREATININE RATIO: 177.1 MG/G CREAT
CREATININE (URINE): 1.29 G/L
HBA1C MFR BLD: 5.9 %

## 2022-01-19 ENCOUNTER — VIRTUAL VISIT (OUTPATIENT)
Dept: PHARMACY | Facility: CLINIC | Age: 67
End: 2022-01-19
Payer: COMMERCIAL

## 2022-01-19 DIAGNOSIS — N40.1 BENIGN PROSTATIC HYPERPLASIA WITH URINARY FREQUENCY: ICD-10-CM

## 2022-01-19 DIAGNOSIS — E11.9 TYPE 2 DIABETES MELLITUS WITHOUT COMPLICATION, WITHOUT LONG-TERM CURRENT USE OF INSULIN (H): ICD-10-CM

## 2022-01-19 DIAGNOSIS — Z78.9 TAKES DIETARY SUPPLEMENTS: ICD-10-CM

## 2022-01-19 DIAGNOSIS — E78.2 MIXED HYPERLIPIDEMIA: ICD-10-CM

## 2022-01-19 DIAGNOSIS — R35.0 BENIGN PROSTATIC HYPERPLASIA WITH URINARY FREQUENCY: ICD-10-CM

## 2022-01-19 DIAGNOSIS — R25.1 TREMOR: Primary | ICD-10-CM

## 2022-01-19 DIAGNOSIS — N52.9 ERECTILE DYSFUNCTION, UNSPECIFIED ERECTILE DYSFUNCTION TYPE: ICD-10-CM

## 2022-01-19 DIAGNOSIS — G62.9 NEUROPATHY: ICD-10-CM

## 2022-01-19 PROCEDURE — 99607 MTMS BY PHARM ADDL 15 MIN: CPT | Performed by: PHARMACIST

## 2022-01-19 PROCEDURE — 99605 MTMS BY PHARM NP 15 MIN: CPT | Performed by: PHARMACIST

## 2022-01-19 NOTE — PROGRESS NOTES
"Medication Therapy Management (MTM) Encounter    ASSESSMENT:                            Medication Adherence/Access: No issues identified    Essential tremor/neuropathy: patient has not yet increased topiramate to the prescribed 100 mg/day per Dr. Delgado. We will add 25 mg in the morning and then reassess tremor control. topiramate may be increased to up to 400 mg per day, as tolerated. Discussed side effects of topiramate and recommend adequate hydration. (Dr. Delgado has given me verbal authorization to titrate topiramate as needed)     Type 2 Diabetes:  stable      Hyperlipidemia: appears stable but no recent lipid panel available      BPH/ED: stable      Vitamins/supplements: stable       PLAN:                            1. Increase topiramate to 50 mg twice daily    2. Make sure to drink 6-8 glasses (8 ounce each) of water to ensure adequate hydration and reduce side effects of topiramate     Follow-up: 2/9/22 at 11 am    SUBJECTIVE/OBJECTIVE:                          Damien Dacosta is a 66 year old male called for a follow-up visit.  Today's visit is a follow-up MTM visit from 12/21/21     Reason for visit: follow up on topiramate for tremor    Allergies/ADRs: Reviewed in chart  Past Medical History: Reviewed in chart  Tobacco: He reports that he has quit smoking. His smoking use included cigars and dip, chew, snus or snuff. His smokeless tobacco use includes chew.  Alcohol: 1 cocktail 4 days/week before dinner - no clear effect on tremor    Medication Adherence/Access: no issues reported    Essential tremor/neuropathy: Currently taking gabapentin 1200 mg nightly and topiramate 25 mg AM/50 mg PM. Reports he can see some benefits in terms of tremor control. He has been sleeping pretty well at night and does not notice any issues with side effects of topiramate. He is sensitive to cold but this has been going on for a long time. For neuropathy, patient reports that gabapentin works \"great.\"      Type 2 Diabetes:  " Currently taking metformin 500 mg AM/1000 mg nightly. Patient is not experiencing side effects. Patient follows with Dr. Root in endocrinology.  Blood sugar monitoring: not discussed today  Symptoms of low blood sugar? none  Symptoms of high blood sugar? none  Aspirin: Not taking due to ?  Statin: Yes: atorvastatin 40 mg daily   ACEi/ARB: No.   A1c: 1/7/22 5.9%      Hyperlipidemia: Current therapy includes atorvastatin 40 mg daily.  Patient reports no significant myalgias or other side effects. Last lipid panel available in medical record was 3/16/20:  Total cholesterol 154  Triglycerides 118  HDL 50  LDL 80     BPH/ED: Taking tamsulosin 0.4 mg daily and Cialis 5 mg daily. No concerns reported today.      Vitamins/supplements: Currently taking the following supplements - Methyl-B12, multivitamin, magnesium glycinate, vitamin D3, zinc    Today's Vitals: There were no vitals taken for this visit.  ----------------    I spent 16 minutes with this patient today. All changes were made via verbal approval with Dr. Delgado. A copy of the visit note was provided to the patient's provider(s).    The patient was sent via [employer] a summary of these recommendations.     Joaquina Alvarez, Pharm.D.  Medication Therapy Management Pharmacist  [facility]    Telemedicine Visit Details  Type of service:  Telephone visit  Start Time: 9:39 AM  End Time: 9:55 AM  Originating Location (patient location): [place]  Distant Location (provider location):  [facility]OLOG[facility]     Medication Therapy Recommendations  Tremor    Current Medication: topiramate (TOPAMAX) 25 MG tablet   Rationale: Dose too low - Dosage too low - Effectiveness   Recommendation: Increase Dose   Status: Accepted per Provider

## 2022-01-19 NOTE — LETTER
January 19, 2022  Damien Dacosta Jr.  [address] [address]    Dear  [facility]        Thank you for talking with me on Jan 19, 2022 about your health and medications. As a follow-up to our conversation, I have included two documents:      1. Your Recommended To-Do List has steps you should take to get the best results from your medications.  2. Your Medication List will help you keep track of your medications and how to take them.    If you want to talk about these documents, please call Joaquina Alvarez RPH at phone: [phone], Monday-Friday 8-4:30pm.    I look forward to working with you and your doctors to make sure your medications work well for you.    Sincerely,    Joaquina Alvarez RPH

## 2022-01-19 NOTE — LETTER
_  Medication List        Prepared on: 1/19/2022     Bring your Medication List when you go to the doctor, hospital, or   emergency room. And, share it with your family or caregivers.     Note any changes to how you take your medications.  Cross out medications when you no longer use them.    Medication How I take it Why I use it Prescriber   amoxicillin (AMOXIL) 500 MG tablet Take 4 tablets by mouth prior to dental work  Infection prevention  Dr. Lewis   atorvastatin (LIPITOR) 40 MG tablet Take 1 tablet by mouth every morning  High cholesterol  Dr. Lewis   gabapentin (NEURONTIN) 300 MG capsule Take 4 capsules by mouth at bedtime  Neuropathy Dr. Lewis   MAGNESIUM CITRATE PO Take 2 tablets (300 mg) by mouth daily  General health  Self    metFORMIN (GLUCOPHAGE) 500 MG tablet Take 1 tablet by mouth in the morning and 2 tablets in the evening Diabetes  Dr. Root   multivitamin (ONE-DAILY) tablet Take 1 tablet by mouth daily General health  Self    METHYLATED B12 tablet  Take 1 tablet by mouth daily General health  Self    tadalafil (CIALIS) 5 MG tablet Take 1 tablet by mouth daily  Erectile dysfunction  Dr. Lewis   tamsulosin (FLOMAX) 0.4 MG capsule Take 1 capsule by mouth every evening Prostate function Dr. Lewis   topiramate (TOPAMAX) 25 MG tablet Take 2 tablets by mouth twice daily  Tremor Jonathan Delgado MD   vitamin D3 (CHOLECALCIFEROL) 50 mcg (2000 units) tablet Take 2 tablets by mouth daily  General health  Self    Zinc 25 MG TABS Take 2 tablets by mouth daily General health  Self          Add new medications, over-the-counter drugs, herbals, vitamins, or  minerals in the blank rows below.    Medication How I take it Why I use it Prescriber                          Allergies:      shellfish allergy        Side effects I have had:              Other Information:             My notes and questions:

## 2022-01-19 NOTE — LETTER
"Recommended To-Do List      Prepared on: 1/19/2022     You can get the best results from your medications by completing the items on this \"To-Do List.\"      Bring your To-Do List when you go to your doctor. And, share it with your family or caregivers.    My To-Do List:  What we talked about: What I should do:   Your medication dosage being too low    Increase your dosage of topiramate (TOPAMAX)          What we talked about: What I should do:                     "

## 2022-02-06 ENCOUNTER — APPOINTMENT (OUTPATIENT)
Dept: CT IMAGING | Facility: CLINIC | Age: 67
End: 2022-02-06
Attending: EMERGENCY MEDICINE
Payer: COMMERCIAL

## 2022-02-06 ENCOUNTER — HOSPITAL ENCOUNTER (EMERGENCY)
Facility: CLINIC | Age: 67
Discharge: HOME | End: 2022-02-06
Attending: EMERGENCY MEDICINE | Admitting: EMERGENCY MEDICINE
Payer: COMMERCIAL

## 2022-02-06 VITALS
HEIGHT: 74 IN | WEIGHT: 235 LBS | BODY MASS INDEX: 30.16 KG/M2 | TEMPERATURE: 96.5 F | RESPIRATION RATE: 16 BRPM | OXYGEN SATURATION: 99 % | SYSTOLIC BLOOD PRESSURE: 162 MMHG | HEART RATE: 66 BPM | DIASTOLIC BLOOD PRESSURE: 65 MMHG

## 2022-02-06 DIAGNOSIS — N20.0 KIDNEY STONE: ICD-10-CM

## 2022-02-06 LAB
ALBUMIN SERPL-MCNC: 4.1 G/DL (ref 3.4–5)
ALBUMIN UR-MCNC: 20 MG/DL
ALP SERPL-CCNC: 60 U/L (ref 40–150)
ALT SERPL W P-5'-P-CCNC: 27 U/L (ref 0–70)
ANION GAP SERPL CALCULATED.3IONS-SCNC: 6 MMOL/L (ref 3–14)
APPEARANCE UR: CLEAR
AST SERPL W P-5'-P-CCNC: 21 U/L (ref 0–45)
BASOPHILS # BLD AUTO: 0.1 10E3/UL (ref 0–0.2)
BASOPHILS NFR BLD AUTO: 0 %
BILIRUB SERPL-MCNC: 0.4 MG/DL (ref 0.2–1.3)
BILIRUB UR QL STRIP: NEGATIVE
BUN SERPL-MCNC: 31 MG/DL (ref 7–30)
CALCIUM SERPL-MCNC: 9.3 MG/DL (ref 8.5–10.1)
CHLORIDE BLD-SCNC: 107 MMOL/L (ref 94–109)
CO2 SERPL-SCNC: 24 MMOL/L (ref 20–32)
COLOR UR AUTO: ABNORMAL
CREAT SERPL-MCNC: 1.32 MG/DL (ref 0.66–1.25)
EOSINOPHIL # BLD AUTO: 0.1 10E3/UL (ref 0–0.7)
EOSINOPHIL NFR BLD AUTO: 1 %
ERYTHROCYTE [DISTWIDTH] IN BLOOD BY AUTOMATED COUNT: 12.2 % (ref 10–15)
GFR SERPL CREATININE-BSD FRML MDRD: 59 ML/MIN/1.73M2
GLUCOSE BLD-MCNC: 143 MG/DL (ref 70–99)
GLUCOSE UR STRIP-MCNC: NEGATIVE MG/DL
HCT VFR BLD AUTO: 40.1 % (ref 40–53)
HGB BLD-MCNC: 13.2 G/DL (ref 13.3–17.7)
HGB UR QL STRIP: ABNORMAL
IMM GRANULOCYTES # BLD: 0.1 10E3/UL
IMM GRANULOCYTES NFR BLD: 0 %
KETONES UR STRIP-MCNC: 40 MG/DL
LEUKOCYTE ESTERASE UR QL STRIP: ABNORMAL
LIPASE SERPL-CCNC: 145 U/L (ref 73–393)
LYMPHOCYTES # BLD AUTO: 2 10E3/UL (ref 0.8–5.3)
LYMPHOCYTES NFR BLD AUTO: 15 %
MCH RBC QN AUTO: 31.3 PG (ref 26.5–33)
MCHC RBC AUTO-ENTMCNC: 32.9 G/DL (ref 31.5–36.5)
MCV RBC AUTO: 95 FL (ref 78–100)
MONOCYTES # BLD AUTO: 0.7 10E3/UL (ref 0–1.3)
MONOCYTES NFR BLD AUTO: 5 %
MUCOUS THREADS #/AREA URNS LPF: PRESENT /LPF
NEUTROPHILS # BLD AUTO: 10.7 10E3/UL (ref 1.6–8.3)
NEUTROPHILS NFR BLD AUTO: 79 %
NITRATE UR QL: NEGATIVE
NRBC # BLD AUTO: 0 10E3/UL
NRBC BLD AUTO-RTO: 0 /100
PH UR STRIP: 5 [PH] (ref 5–7)
PLATELET # BLD AUTO: 231 10E3/UL (ref 150–450)
POTASSIUM BLD-SCNC: 4.1 MMOL/L (ref 3.4–5.3)
PROT SERPL-MCNC: 7.6 G/DL (ref 6.8–8.8)
RBC # BLD AUTO: 4.22 10E6/UL (ref 4.4–5.9)
RBC URINE: 22 /HPF
SODIUM SERPL-SCNC: 137 MMOL/L (ref 133–144)
SP GR UR STRIP: 1.03 (ref 1–1.03)
UROBILINOGEN UR STRIP-MCNC: NORMAL MG/DL
WBC # BLD AUTO: 13.6 10E3/UL (ref 4–11)
WBC URINE: 27 /HPF

## 2022-02-06 PROCEDURE — 258N000003 HC RX IP 258 OP 636: Performed by: EMERGENCY MEDICINE

## 2022-02-06 PROCEDURE — 96376 TX/PRO/DX INJ SAME DRUG ADON: CPT

## 2022-02-06 PROCEDURE — 96375 TX/PRO/DX INJ NEW DRUG ADDON: CPT

## 2022-02-06 PROCEDURE — 87086 URINE CULTURE/COLONY COUNT: CPT | Performed by: EMERGENCY MEDICINE

## 2022-02-06 PROCEDURE — 85025 COMPLETE CBC W/AUTO DIFF WBC: CPT | Performed by: EMERGENCY MEDICINE

## 2022-02-06 PROCEDURE — 36415 COLL VENOUS BLD VENIPUNCTURE: CPT | Performed by: EMERGENCY MEDICINE

## 2022-02-06 PROCEDURE — 99285 EMERGENCY DEPT VISIT HI MDM: CPT | Mod: 25

## 2022-02-06 PROCEDURE — 96361 HYDRATE IV INFUSION ADD-ON: CPT

## 2022-02-06 PROCEDURE — 96374 THER/PROPH/DIAG INJ IV PUSH: CPT

## 2022-02-06 PROCEDURE — 83690 ASSAY OF LIPASE: CPT | Performed by: EMERGENCY MEDICINE

## 2022-02-06 PROCEDURE — 81001 URINALYSIS AUTO W/SCOPE: CPT | Performed by: EMERGENCY MEDICINE

## 2022-02-06 PROCEDURE — 250N000011 HC RX IP 250 OP 636: Performed by: EMERGENCY MEDICINE

## 2022-02-06 PROCEDURE — 80053 COMPREHEN METABOLIC PANEL: CPT | Performed by: EMERGENCY MEDICINE

## 2022-02-06 PROCEDURE — 74176 CT ABD & PELVIS W/O CONTRAST: CPT

## 2022-02-06 RX ORDER — MORPHINE SULFATE 4 MG/ML
4 INJECTION, SOLUTION INTRAMUSCULAR; INTRAVENOUS ONCE
Status: COMPLETED | OUTPATIENT
Start: 2022-02-06 | End: 2022-02-06

## 2022-02-06 RX ORDER — OXYCODONE HYDROCHLORIDE 5 MG/1
5-10 TABLET ORAL EVERY 6 HOURS PRN
Qty: 10 TABLET | Refills: 0 | Status: SHIPPED | OUTPATIENT
Start: 2022-02-06 | End: 2023-05-02

## 2022-02-06 RX ORDER — ONDANSETRON 2 MG/ML
4 INJECTION INTRAMUSCULAR; INTRAVENOUS ONCE
Status: COMPLETED | OUTPATIENT
Start: 2022-02-06 | End: 2022-02-06

## 2022-02-06 RX ORDER — TAMSULOSIN HYDROCHLORIDE 0.4 MG/1
0.4 CAPSULE ORAL DAILY
Qty: 7 CAPSULE | Refills: 0 | Status: SHIPPED | OUTPATIENT
Start: 2022-02-06 | End: 2022-02-13

## 2022-02-06 RX ADMIN — MORPHINE SULFATE 4 MG: 4 INJECTION, SOLUTION INTRAMUSCULAR; INTRAVENOUS at 17:09

## 2022-02-06 RX ADMIN — SODIUM CHLORIDE 1000 ML: 9 INJECTION, SOLUTION INTRAVENOUS at 17:08

## 2022-02-06 RX ADMIN — SODIUM CHLORIDE 1000 ML: 9 INJECTION, SOLUTION INTRAVENOUS at 18:37

## 2022-02-06 RX ADMIN — MORPHINE SULFATE 4 MG: 4 INJECTION, SOLUTION INTRAMUSCULAR; INTRAVENOUS at 17:39

## 2022-02-06 RX ADMIN — ONDANSETRON 4 MG: 2 INJECTION INTRAMUSCULAR; INTRAVENOUS at 17:09

## 2022-02-06 ASSESSMENT — ENCOUNTER SYMPTOMS
DYSURIA: 0
HEMATURIA: 0
DIARRHEA: 0
VOMITING: 1
NAUSEA: 1
ABDOMINAL PAIN: 1

## 2022-02-06 ASSESSMENT — MIFFLIN-ST. JEOR: SCORE: 1905.7

## 2022-02-06 NOTE — ED PROVIDER NOTES
"  History   Chief Complaint:  Abdominal Pain and Nausea & Vomiting       HPI   Damien Dacosta Jr. is a 66 year old male with history of hyperlipidemia and Type 2 diabetes mellitus who presents with nausea, vomiting, and abdominal pain. The patient reported approximately 5 hours PTA he started having left lower quadrant tenderness.  He described it as a similar sensation to needing to have a bowel movement. However his pain was not relieved by a bowel movement, nor was it relived by multiple episodes of emesis that he has had since onset. He has never felt anything like this before and no one else at home is having similar symptoms. He denied any hematuria.     Review of Systems   Gastrointestinal: Positive for abdominal pain, nausea and vomiting. Negative for diarrhea.   Genitourinary: Negative for dysuria and hematuria.   All other systems reviewed and are negative.    Allergies:  Shellfish Allergy    Medications:  Lipitor   Neurontin   Metformin   Omeprazole   Cialis   Flomax   Topiramate    Past Medical History:     Arthritis   hyperlipidemia   BHP   Type 2 diabetes mellitus with peripheral neuropathy     Past Surgical History:    Arthroplasty knee - bilateral   Tonsillectomy   Laser TURP   Bladder stone procedure     Family History:    Cancer   CAD   Diabetes   Heart failure     Social History:   Presents to the ED: Unaccompanied       Physical Exam     Physical Exam    Patient Vitals for the past 24 hrs:   BP Temp Temp src Pulse Resp SpO2 Height Weight   02/06/22 1830 (!) 171/85 -- -- (!) 46 12 99 % -- --   02/06/22 1820 (!) 163/79 -- -- -- -- 98 % -- --   02/06/22 1800 (!) 167/80 -- -- (!) 43 12 98 % -- --   02/06/22 1750 (!) 153/70 -- -- (!) 41 14 92 % -- --   02/06/22 1730 (!) 162/89 -- -- (!) 43 14 98 % -- --   02/06/22 1720 (!) 169/83 -- -- -- 14 97 % -- --   02/06/22 1710 (!) 164/74 -- -- (!) 47 12 100 % -- --   02/06/22 1654 (!) 144/54 (!) 96.5  F (35.8  C) Temporal (!) 42 16 100 % 1.88 m (6' 2\") 106.6 " kg (235 lb)       General: Alert and Interactive.   Head: No signs of trauma.   Mouth/Throat: Oropharynx is clear and moist.   Eyes: Conjunctivae are normal. Pupils are equal, round, and reactive to light.   Neck: Normal range of motion. No nuchal rigidity.   CV: Normal rate and regular rhythm.    Resp: Effort normal and breath sounds normal. No respiratory distress.   GI: Soft. Mild left lower quadrant tenderness. There is no or guarding.   MSK: Normal range of motion. no edema.   Neuro: The patient is alert and oriented to person, place, and time.  PERRLA, EOMI, strength in upper/lower extremities normal and symmetrical.   Sensation normal. Speech normal.  GCS eye subscore is 4. GCS verbal subscore is 5. GCS motor subscore is 6.   Skin: Skin is warm and dry. No rash noted.   Psych: normal mood and affect. behavior is normal.       Emergency Department Course     Imaging:  Abd/pelvis CT no contrast - Stone Protocol   Final Result   IMPRESSION:    1.  At least partially obstructive distal left ureterovesicular junction calculus measuring 0.6 x 0.4 x 0.7 cm.   2.  Colonic diverticulosis without evidence for acute diverticulitis.   3.  Evaluation of the solid organs of the abdomen and pelvis is limited due to lack of IV contrast.           Report per radiology    Laboratory:  Labs Ordered and Resulted from Time of ED Arrival to Time of ED Departure   COMPREHENSIVE METABOLIC PANEL - Abnormal       Result Value    Sodium 137      Potassium 4.1      Chloride 107      Carbon Dioxide (CO2) 24      Anion Gap 6      Urea Nitrogen 31 (*)     Creatinine 1.32 (*)     Calcium 9.3      Glucose 143 (*)     Alkaline Phosphatase 60      AST 21      ALT 27      Protein Total 7.6      Albumin 4.1      Bilirubin Total 0.4      GFR Estimate 59 (*)    CBC WITH PLATELETS AND DIFFERENTIAL - Abnormal    WBC Count 13.6 (*)     RBC Count 4.22 (*)     Hemoglobin 13.2 (*)     Hematocrit 40.1      MCV 95      MCH 31.3      MCHC 32.9      RDW  12.2      Platelet Count 231      % Neutrophils 79      % Lymphocytes 15      % Monocytes 5      % Eosinophils 1      % Basophils 0      % Immature Granulocytes 0      NRBCs per 100 WBC 0      Absolute Neutrophils 10.7 (*)     Absolute Lymphocytes 2.0      Absolute Monocytes 0.7      Absolute Eosinophils 0.1      Absolute Basophils 0.1      Absolute Immature Granulocytes 0.1      Absolute NRBCs 0.0     LIPASE - Normal    Lipase 145     ROUTINE UA WITH MICROSCOPIC REFLEX TO CULTURE      Emergency Department Course:  Reviewed:  I reviewed nursing notes, vitals, past medical history and Care Everywhere    Assessments:  1711 I obtained history and examined the patient as noted above.   1803 I rechecked the patient and explained findings.     Consults:  1845 I consulted with Dr. Berry, [facility], regarding the patient's history and presentation here in the emergency department.         Interventions:  1708 NS 1L IV   1709 Morphine 4 mg IV    Zofran 4 mg IV  1739 Morphine 4 mg IV    1837 NS 1L IV     Disposition:  The patient was discharged to home.     Impression & Plan     Medical Decision Making:  Damien Dacosta Jr. is a 66 year old male who presents with left lower quadrant pain. A broad differential diagnosis was considered including diverticulitis, ureterolithiasis, tumor, colitis, cholecystitis, aneurysm, dissection, volvulus, appendicitis, splenic issue, stomach pathology, ulcer, hydronephrosis, pneumonia, rib fracture, UTI, pyelonephritis amongst many other etiologies.  Signs and symptoms consistent with ureterolithiasis.  Patients pain is controlled in ED and given flomax.  No signs of infected stone.  Patient is hemodynamically stable in ED. Plan is home with abdominal pain recheck by primary care physician or return to ED if symptoms worsen.  Return for fevers greater than 102, increasing pain, other new symptoms develop.  Ureterolithiasis precautions for home.  Questions were answered.     Diagnosis:     ICD-10-CM    1. Kidney stone  N20.0          The patient will follow up with Dr Berry.    Discharge Medications:  Discharge Medication List as of 2/6/2022  7:53 PM      START taking these medications    Details   oxyCODONE (ROXICODONE) 5 MG tablet Take 1-2 tablets (5-10 mg) by mouth every 6 hours as needed for severe pain, Disp-10 tablet, R-0, Local Print         flomax x 7 days    Scribe Disclosure:  SREEKANTH, Renetta Salinas, am serving as a scribe at 5:05 PM on 2/6/2022 to document services personally performed by Juni Key MD based on my observations and the provider's statements to me.             Juni Key MD  02/06/22 8100

## 2022-02-07 LAB — BACTERIA UR CULT: NO GROWTH

## 2022-02-11 ENCOUNTER — VIRTUAL VISIT (OUTPATIENT)
Dept: PHARMACY | Facility: CLINIC | Age: 67
End: 2022-02-11
Payer: COMMERCIAL

## 2022-02-11 DIAGNOSIS — K21.9 GASTROESOPHAGEAL REFLUX DISEASE WITHOUT ESOPHAGITIS: ICD-10-CM

## 2022-02-11 DIAGNOSIS — G62.9 NEUROPATHY: ICD-10-CM

## 2022-02-11 DIAGNOSIS — R25.1 TREMOR: Primary | ICD-10-CM

## 2022-02-11 DIAGNOSIS — N20.0 KIDNEY STONE: ICD-10-CM

## 2022-02-11 PROCEDURE — 99606 MTMS BY PHARM EST 15 MIN: CPT | Performed by: PHARMACIST

## 2022-02-11 NOTE — PATIENT INSTRUCTIONS
Recommendations from today's MTM visit:                                                      1. Please inform your urologist that you started on topiramate 2 months ago in case there is an association between the medication and your kidney stone    2. We will keep the topiramate at 75 mg daily for now until we hear back from urology    Follow-up: 3 months or sooner if needed    It was great to speak with you today.  I value your experience and would be very thankful for your time with providing feedback on our clinic survey. You may receive a survey via email or text message in the next few days.     To schedule another MTM appointment, please call the clinic directly or you may call the MTM scheduling line at [phone] or toll-free at [phone].     My Clinical Pharmacist's contact information:                                                      Please feel free to contact me with any questions or concerns you have.      Joaquina Alvarez, Pharm.D.  Medication Therapy Management Pharmacist  [facility]

## 2022-02-11 NOTE — Clinical Note
He has a kidney stone and is on Topamax. He has a urologist who may have been aware of the Topamax  but I asked the patient to make sure urologist knows this is a relatively new drug for him and could be the cause.

## 2022-02-11 NOTE — PROGRESS NOTES
"Medication Therapy Management (MTM) Encounter    ASSESSMENT:                            Medication Adherence/Access: No issues identified    Essential tremor/neuropathy: tremor improved with topiramate but I am concerned that the topiramate may have contributed to the kidney stone. I asked the patient to discuss this with his urologist. We may need to wean off topiramate if deemed the cause of the stone. He's only been on topiramate for about 2 months.     Kidney stone: see above    GERD: agree with plan for short-term use of PPI then stop    PLAN:                            1. Please inform your urologist that you started on topiramate 2 months ago in case there is an association between the medication and your kidney stone    2. We will keep the topiramate at 75 mg daily for now until we hear back from urology    Follow-up: 3 months or sooner if needed    SUBJECTIVE/OBJECTIVE:                          Damien Dacosta is a 66 year old male called for a follow-up visit.  Today's visit is a follow-up MTM visit from 1/19/22     Reason for visit: follow up on medications.    Allergies/ADRs: Reviewed in chart  Past Medical History: Reviewed in chart  Tobacco: He reports that he has quit smoking. His smoking use included cigars and dip, chew, snus or snuff. His smokeless tobacco use includes chew.  Alcohol: 1 cocktail 4 days/week before dinner - no clear effect on tremor    Medication Adherence/Access: no issues reported    Essential tremor/neuropathy: Currently taking gabapentin 1200 mg nightly and topiramate 75 mg nightly. Patient states that his tremor is better with the topiramate. He has still noticed that when he exercises he gets a little light-headed/winded more easily but admits he has just been getting back into regular exercise more often recently.     Kidney stone: was seen in the ER on 2/6/22 for a kidney stone. Has been in touch with his urologist about this and will be doing a 24 hour \"metabolic panel\" at " home. Has been using a strainer to try to capture the stone- hasn't passed yet. He reports a history of a bladder stone as well. Drinking lots of water.    GERD: Current medications include: Prilosec (omeprazole) daily - trying it for 30 days per his PCP. The heartburn has resolved since starting the medication. About 10-15 years ago he had heartburn but hadn't had any symptoms since.    Today's Vitals: There were no vitals taken for this visit.  ----------------    I spent 15 minutes with this patient today. I offer these suggestions for consideration by Dr. Delgado. A copy of the visit note was provided to the patient's provider(s).    The patient was sent via [email or web address] a summary of these recommendations.     Joaquina Alvarez, Pharm.D.  Medication Therapy Management Pharmacist  [facility]    Telemedicine Visit Details  Type of service:  Telephone visit  Start Time: 10:00 AM  End Time: 10:15 AM  Originating Location (patient location): Home  Distant Location (provider location):  [facility]     Medication Therapy Recommendations  No medication therapy recommendations to display

## 2022-02-19 ENCOUNTER — HEALTH MAINTENANCE LETTER (OUTPATIENT)
Age: 67
End: 2022-02-19

## 2022-03-16 DIAGNOSIS — R25.1 TREMOR: ICD-10-CM

## 2022-03-16 RX ORDER — TOPIRAMATE 25 MG/1
TABLET, FILM COATED ORAL
Qty: 90 TABLET | Refills: 11 | Status: SHIPPED | OUTPATIENT
Start: 2022-03-16 | End: 2023-03-30

## 2022-03-16 NOTE — TELEPHONE ENCOUNTER
Rx Authorization:    Requested Medication/ Dose: Topiramate 25MG tabs    Date last refill ordered: 12/21/21    Quantity ordered: 120 tabs    # refills: 11    Date of last clinic visit with ordering provider: 12/10/21    Date of next clinic visit with ordering provider: F/U 1 year    All pertinent protocol data (lab date/result):     Include pertinent information from patients message:

## 2022-04-16 ENCOUNTER — HEALTH MAINTENANCE LETTER (OUTPATIENT)
Age: 67
End: 2022-04-16

## 2022-07-25 ENCOUNTER — TRANSFERRED RECORDS (OUTPATIENT)
Dept: MULTI SPECIALTY CLINIC | Facility: CLINIC | Age: 67
End: 2022-07-25

## 2022-07-25 LAB — HBA1C MFR BLD: 6 %

## 2022-08-06 ENCOUNTER — HEALTH MAINTENANCE LETTER (OUTPATIENT)
Age: 67
End: 2022-08-06

## 2022-10-22 ENCOUNTER — HEALTH MAINTENANCE LETTER (OUTPATIENT)
Age: 67
End: 2022-10-22

## 2022-11-01 NOTE — Clinical Note
[facility] MEDICAL OFFICE CTR RADIATION ONCOLOGY  [address] [address]  [id number]  [facility] [address]  Dept Phone: [phone]    Special Treatment Procedure Note    Patient Name:  Cherri Otto  YOB: 1968  MRN:  [id number]  Account Number:    Referring Physician:  CHRISTA NORTON  Dictating Physician:  Angela Perera MD    Diagnosis:  Malignant neoplasm of left breast in female, estrogen receptor positive (CMS/HCC) C50.912, Z17.0, Ductal carcinoma in situ (DCIS) of left breast D05.12    Cancer Staging  Malignant neoplasm of left breast in female, estrogen receptor positive (CMS/HCC)  Staging form: Breast, AJCC 8th Edition  - Pathologic stage from 9/8/2022: Stage 0 (pTis (DCIS), pN0, cM0, ER+, NV+, HER2: Not Assessed) - Signed by Kayla Collins CNP on 9/21/2022      The above patient has completed a course of radiation therapy with the above listed diagnosis.    Additional time was required with the treatment management and/or planning phases due to: breath hold treatment technique requires additional treatment planning and daily set up time on the treatment machine.   He is not too keen on starting primidone or topiramate but we did discuss that we could incrementally adjust the gabapentin dose to see if it improves his tremor.  Are you okay with me adjusting his dose of gabapentin if it is helpful?

## 2022-12-04 ENCOUNTER — HEALTH MAINTENANCE LETTER (OUTPATIENT)
Age: 67
End: 2022-12-04

## 2023-03-28 DIAGNOSIS — R25.1 TREMOR: ICD-10-CM

## 2023-03-28 NOTE — TELEPHONE ENCOUNTER
Rx Authorization:  Requested Medication/ Dose topiramate (TOPAMAX) 25 MG tablet  Date last refill ordered: 3/16/22  Quantity ordered: 90 tablets  # refills: 1  Date of last clinic visit with ordering provider: 12/10/21  Date of next clinic visit with ordering provider:   All pertinent protocol data (lab date/result):   Include pertinent information from patients message:

## 2023-03-30 RX ORDER — TOPIRAMATE 25 MG/1
TABLET, FILM COATED ORAL
Qty: 90 TABLET | Refills: 0 | Status: SHIPPED | OUTPATIENT
Start: 2023-03-30 | End: 2023-05-02

## 2023-03-30 NOTE — TELEPHONE ENCOUNTER
Will send a 30 day supply to be signed by Dr. Delgado. An appointment is needed for further refills.

## 2023-04-01 ENCOUNTER — HEALTH MAINTENANCE LETTER (OUTPATIENT)
Age: 68
End: 2023-04-01

## 2023-04-22 DIAGNOSIS — R25.1 TREMOR: ICD-10-CM

## 2023-04-24 RX ORDER — TOPIRAMATE 25 MG/1
TABLET, FILM COATED ORAL
Qty: 90 TABLET | Refills: 11 | OUTPATIENT
Start: 2023-04-24

## 2023-04-24 NOTE — TELEPHONE ENCOUNTER
AMBERLY to call [facility] with questions or concerns or contact the surgeon office dr scruggs    Left Voicemail (1st Attempt), sent MyC for the patient to call back and schedule the following:    Appointment type: Return Movement Disorder  Provider: Dr. eDlgado  Return date: First available  Specialty phone number: [phone]  Additional appointment(s) needed: N/A  Additonal Notes: N/A    Marcos Murcia on 4/24/2023 at 5:17 PM

## 2023-04-24 NOTE — TELEPHONE ENCOUNTER
Is this a [place] patient? Yes    Last re-ordered: 3/30/2023 for a 30 supply with 0 refills    Last appointment: 12/10/2021 with Dr. Delgado    Next appointment: None    Action taken: Sent back to the navigator team. David needs an appointment to receive further refills or he may have his primary doctor take over the prescription.

## 2023-04-24 NOTE — TELEPHONE ENCOUNTER
Rx Authorization:  Requested Medication/ Dose TOPIRAMATE TABS 25MG  Date last refill ordered:   Quantity ordered:   # refills:   Date of last clinic visit with ordering provider:   Date of next clinic visit with ordering provider:   All pertinent protocol data (lab date/result):   Include pertinent information from patients message:

## 2023-04-26 PROBLEM — E11.29 MICROALBUMINURIA DUE TO TYPE 2 DIABETES MELLITUS (H): Status: ACTIVE | Noted: 2022-01-16

## 2023-04-26 PROBLEM — R80.9 MICROALBUMINURIA DUE TO TYPE 2 DIABETES MELLITUS (H): Status: ACTIVE | Noted: 2022-01-16

## 2023-04-26 RX ORDER — POTASSIUM CITRATE 10 MEQ/1
TABLET, EXTENDED RELEASE ORAL
COMMUNITY
End: 2023-05-02

## 2023-04-26 RX ORDER — POTASSIUM CITRATE 10 MEQ/1
TABLET, EXTENDED RELEASE ORAL
COMMUNITY
Start: 2023-04-21 | End: 2023-04-26

## 2023-04-26 RX ORDER — NAPROXEN 500 MG/1
TABLET ORAL
COMMUNITY
Start: 2022-09-12 | End: 2023-05-02

## 2023-04-26 RX ORDER — TAMSULOSIN HYDROCHLORIDE 0.4 MG/1
1 CAPSULE ORAL EVERY EVENING
COMMUNITY

## 2023-04-26 RX ORDER — CELECOXIB 200 MG/1
CAPSULE ORAL
COMMUNITY
Start: 2022-10-11 | End: 2023-05-02

## 2023-04-26 NOTE — PROGRESS NOTES
VIDEO VISIT    Date of Visit: May 2, 2023  Name: Damien Dacosta Jr.  Date of Birth 1955  [address] [facility] [address]   [email or web address]  [phone] (M)   [phone] (H)   [phone] (W)   María Dacosta  [id number]    [phone]     He is on propranolol and his heart rate is 50 and he has not noticed much benefit. He may want to stop the daily use of propranolol due to its limited benefit and use it as needed. I am not sure we can increase the dose of this medication further given his low heart rate. He had been seen by a neurologist in the past: Dr. Gabriella Elena      Discussed topiramate/topamax and primidone/mysoline and discussed having Joaquina Melgar's input from pharmacy     Discussed occupational therapy - and made referral     Discussed deep brain stimulation (DBS)     [email or web address]    Assessment:  (G25.9) Movement disorder  (primary encounter diagnosis)  (R25.1) Tremor  Tremor - diagnoses of essential tremor.   Propranolol inderal 20mg twice daily  - stopped  Started on propranolol in December 2020  Tremor duration 3 years  Right handed  Bilateral hand tremor  Handwriting is affected. shakey  Utensils - difficulties with fork   May need two hands to hold a cup  He has not dropped things but has significant shaking.   He has a bit of head tremor.   Alleviating factors - no clear - not sure if alcohol helps.   Tremor is better in the morning and increased after working out.   Has had problems with putting - short puts.      (G62.9) Neuropathy  Gabapentin neurontin 300mg x4 at night.   For neuropathy  May have some creepy crawling leg feelings   The gabapentin is helpful for his neuropathy   If he forgets to take it he cannot fall asleep.     Review of diagnosis        Review of diagnosis    Tremor  neuropathy    Avoidance of dopamine blockers   Not taking    Motor complication review   n/a    Review of Impulse control disorders   Not     Review of  surgical or medication options   reviewed    Gait/Balance/Falls   No falls or balance    Exercise/Therapy performed/offered   Exercising -  Playing golf  Does personal training weekly  Does physical th erapy for his hip - had hip replacement last fall  Elliptical and rowing    Cognitive/Driving   Denies  retired    Mood   Mood is fine    Hallucinations/delusions   denies    Sleep   able to fall asleep relatively quickly within 15-20 minutes and sleeps through the night.   He gets up to urinate once nightly  Snores a bit  No loss of smell  No talking in his sleep  Goes to bed around 1030p and wakes up 7am  Taking gabapentin helps his sleep     Bladder/Renal/Prostate/Gyn/Other  he has been diagnosed with bph - shaved the prostate  Bladder stone on 11/26/2021  Not sure as to etiology of bladder stone but presumed not linked to increased risk of kidney stone  He had procedure to alleviate the altered opening for his urinary problems.   No renal stones    GI/Constipation/GERD   Omeprazole prilosec 20mg  Senna-docusate senokot-S 8.6-50 -  Not taking  Regular bowels     ENDO/Lipid/DM/Bone density/Thyroid  Metformin glucophage 500mg  In am and 2 in pm. Has 5.8 a1c and 130s   Has a bit of neuropathy. No renal or eye problems  Has some involvement in the bottom of his feet.   a1c 5.9 on 11/18/2021  6.0 on 1/18/2023     Cardio/heart/Hyper or Hypotensive   ECG 11/26/2021  Last stress echocardiogram - 10/21/2014  No issues    Vision/Dry Eyes/Cataracts/Glaucoma/Macular   Has not had cataract     Heme/Anticoagulation/Antiplatelet/Anemia/Other  Aspirin 325mg - not taking  B12 methylcobalamin taking     ENT/Resp  No loss of smell  Had covid in the past  2022 or so     Skin/Cancer/Seborrhea/other  No skin cancer  Precancerous lesions removed    Musculoskeletal/Pain/Headache  Hip surgery 10/2022  Walking fine     Other:  Acetaminophen tylenol 325mg as needed   Oxycodone IR roxicodone 5mg - not taking      Amoxicillin amoxil  500mg prior to dental      Medications     8am   8pm   Acetaminophen tylenol 325mg prn       Amoxicillin amoxil 500mg  dental        Aspirin 325mg stopped       Atorvastatin lipitor 40mg  1       Celecoxib celebrex 200mg stopped     Gabapentin neurontin 300mg (allie tran)     4   Magnesium citrate 150mg 2       Metformin glucophage 500mg 1   2   MVI 1       Naproxen naprosyn 500mg stopped     Nonformularly Vit B12 100mcg, vit b6 46mg, 400 mcg folate 1       Omeprazole prilosec 20mg  1     Oxycodone roxicodone 5mg  stopped     Potassium citrate urocit K 10meq CR 2  2   Propranolol inderal 20mg  stopped       Senna-docusate senokot-S 8.6-50 stopped   1   Tadalafil cialis 5mg prn       Tamsulosin flomax 0.4mg     1    Topiramate topamax 25mg   3   Vitamin D3 cholecalciferol 50 mcg (2000u) 2 (winter)       Zinc 25mg 2                Plan:  Tremors are better with topiramate (topamax)  Has improvement with topiramate 3 x 25mg at night   Aware of stone risks  Wishes to stay on this medication and not increase the dose  Medication was refilled.     Has neuropathy   Taking gabapentin 4 x 300mg at night   Has diabetes with A1c was 6.0  He is getting this from allie RODRIGUEZ from Dr. Lewis's o ffice    Last visit was 12/2021    Return visit  ~ 1 year or so.           Medical Decision Making:  #  Chronic progressive medical conditions addressed  Neuropathy and tremor  Review and/or interpretation of unique test or documentation from a provider outside of neurology yes a1c   Independent historian provided additional details  no   Prescription drug management and review of potential side effects and/or monitoring for side effects  yes   Health impacted by social determinants of health  no    I have reviewed the note as documented above.  This accurately captures the substance of my conversation with the patient and total time spent preparing for visit, executing visit and completing visit on the day of the visit:  20 minutes.  "    Jonathan Delgado MD      ------------------------------------------------------------------------------------------------------------------------------------------------------------------------    Video-Visit Details    The patient has been notified of following:     \"After a review of the patient s situation, this visit was changed from an in-person visit to a video visit to reduce the risk of COVID-19 exposure.   The patient is being evaluated via a billable video visit.\"    \"This video visit will be conducted via a call between you and your physician/provider. We have found that certain health care needs can be provided without the need for an in-person physical exam.  This service lets us provide the care you need with a video conversation.  If a prescription is necessary we can send it directly to your pharmacy.  If lab work is needed we can place an order for that and you can then stop by our lab to have the test done at a later time.    If during the course of the call the physician/provider feels a video visit is not appropriate, you will not be charged for this service.\"     Patient has given verbal consent for Video visit? Yes    Patient would like the video invitation sent by:     Type of service:  Video Visit    Video Start Time:     Video End Time (time video stopped):     Duration:  minutes - see above    Originating Location (pt. Location):     Distant Location (provider location):  [facility]     Mode of Communication:  Video Conference via Apptimize (and if not possible then doximity)      Jonathan Delgado MD      --------------------------------------------------------------------------------------------------------------    Damien JAIN Praneeth Puente is a 67 year old male who is being evaluated via a billable video visit.      Charts reviewed  Consult from  Images reviewed        I have reviewed and updated the patient's Past Medical History, Social History, Family History " and Medication List.    ALLERGIES  Shellfish allergy    Lasts visit details if there was a last visit:       14 Review of systems  are negative except for   Patient Active Problem List   Diagnosis     Numbness and tingling in left arm     Musculoskeletal disorder involving upper trapezius muscle     Right knee pain     Left knee pain     S/P total knee replacement using cement, right     Acute pain of right knee     Knee effusion, right     S/P total knee replacement using cement, left     Benign prostatic hyperplasia with urinary frequency     Family history of cardiovascular disease     Routine general medical examination at a health care facility     Type 2 diabetes, controlled, with peripheral neuropathy (H)     Hyperlipidemia     Polyneuropathy associated with underlying disease (H)     Increased frequency of urination     Diabetes mellitus (H)     Microalbuminuria due to type 2 diabetes mellitus (H)        Allergies   Allergen Reactions     Shellfish Allergy Anaphylaxis and Other (See Comments)     Past Surgical History:   Procedure Laterality Date     ARTHROPLASTY KNEE Right 09/20/2016    Procedure: ARTHROPLASTY KNEE;  Surgeon: Tommie Gramajo MD;  Location:  OR     ARTHROPLASTY KNEE Left 03/15/2018    Procedure: ARTHROPLASTY KNEE;  LEFT TOTAL KNEE ARTHROPLASTY;  Surgeon: Tommie Gramajo MD;  Location:  OR     COLONOSCOPY       CYSTOSCOPY PROSTATE W/ LASER N/A 11/26/2021    Procedure: CYSTOSCOPY WITH QUANTA LASER TRANSURETHRAL INCISION OF THE PROSTATE;  Surgeon: Arsh Herbert MD;  Location: [facility] OR     CYSTOSCOPY, WITH LITHOLAPAXY USING HOLMIUM LASER N/A 11/26/2021    Procedure: CYSTOLITHOLAPAXY;  Surgeon: Arsh Herbert MD;  Location: [facility] OR     ENT SURGERY      tonsillectomy     GENITOURINARY SURGERY  2012    laser TURP     ORTHOPEDIC SURGERY Bilateral     knee arthroscopy     other surgery  11/26/2021    bladder stone -     Past Medical History:   Diagnosis Date      Arthritis     osteoarthrosis of lower keg     Diabetes (H)     type 2     Hyperlipidemia      Social History     Socioeconomic History     Marital status:      Spouse name: Not on file     Number of children: Not on file     Years of education: Not on file     Highest education level: Not on file   Occupational History     Not on file   Tobacco Use     Smoking status: Former     Types: Cigars, Dip, chew, snus or snuff     Smokeless tobacco: Current     Types: Chew     Tobacco comments:     quit cigs 20 years ago   Vaping Use     Vaping status: Not on file   Substance and Sexual Activity     Alcohol use: Yes     Comment: 2-4/ week     Drug use: No     Sexual activity: Not on file   Other Topics Concern     Parent/sibling w/ CABG, MI or angioplasty before 65F 55M? 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 onset 20 years before passed away     Other - See Comments Mother         head and hand tremor     Prostate Cancer Father      Coronary Artery Disease Father      Heart Failure Father      Diabetes Father      Cancer Sister         bone cancer, born 150     Tremor Sister         present for 5 years - 2015     Prostate Cancer Brother         born 1948     Tremor Brother         5 years - 2015     Tremor Maternal Grandfather         hand when using hands. long standing     Other - See Comments Zion roberts     Other - See Comments Zion         kaykay roberts     Other -  See Comments Daughter         [place]     Current Outpatient Medications   Medication Sig Dispense Refill     amoxicillin (AMOXIL) 500 MG tablet Take 4 tabs oral 1 hour before dental work       atorvastatin (LIPITOR) 40 MG tablet Take 40 mg by mouth every morning        celecoxib (CELEBREX) 200 MG capsule Dose?       gabapentin (NEURONTIN) 300 MG capsule 4 x 300mg capsules (1200mg) by mouth at bedtime       MAGNESIUM CITRATE PO 2 tabs by mouth daily (each contains 150mg) = 300mg/day       metFORMIN (GLUCOPHAGE) 500 MG tablet TAKE 1 TABLET EVERY MORNING AND 2 TABLETS AT BEDTIME       multivitamin (ONE-DAILY) tablet Take 1 tablet by mouth daily       naproxen (NAPROSYN) 500 MG tablet Dose?       NONFORMULARY 1 tab by mouth daily 1100 mcg vitamin B12, 46 mg vitamin B6, 400 mcg folate       omeprazole (PRILOSEC) 20 MG DR capsule Take 20 mg by mouth daily       oxyCODONE (ROXICODONE) 5 MG tablet Take 1-2 tablets (5-10 mg) by mouth every 6 hours as needed for severe pain 10 tablet 0     potassium citrate (UROCIT-K) 10 MEQ (1080 MG) CR tablet 2 tabs twice daily       potassium citrate (UROCIT-K) 10 MEQ (1080 MG) CR tablet Dose?       tadalafil (CIALIS) 5 MG tablet Take 5 mg by mouth daily        tamsulosin (FLOMAX) 0.4 MG capsule Take 1 capsule by mouth every evening       topiramate (TOPAMAX) 25 MG tablet TAKE 3 TABLETS NIGHTLY 90 tablet 0     vitamin D3 (CHOLECALCIFEROL) 50 mcg (2000 units) tablet Take 2 tablets by mouth daily        Zinc 25 MG TABS 2 x 25mg tabs by mouth daily

## 2023-05-02 ENCOUNTER — VIRTUAL VISIT (OUTPATIENT)
Dept: NEUROLOGY | Facility: CLINIC | Age: 68
End: 2023-05-02
Payer: COMMERCIAL

## 2023-05-02 DIAGNOSIS — R25.1 TREMOR: ICD-10-CM

## 2023-05-02 DIAGNOSIS — G25.9 MOVEMENT DISORDER: Primary | ICD-10-CM

## 2023-05-02 PROCEDURE — 99213 OFFICE O/P EST LOW 20 MIN: CPT | Mod: VID | Performed by: PSYCHIATRY & NEUROLOGY

## 2023-05-02 RX ORDER — POTASSIUM CITRATE 10 MEQ/1
20 TABLET, EXTENDED RELEASE ORAL 2 TIMES DAILY
COMMUNITY
Start: 2023-01-18

## 2023-05-02 RX ORDER — TOPIRAMATE 25 MG/1
4 TABLET, FILM COATED ORAL AT BEDTIME
COMMUNITY
Start: 2023-01-18 | End: 2023-05-02

## 2023-05-02 RX ORDER — CHOLECALCIFEROL (VITAMIN D3) 50 MCG
TABLET ORAL
COMMUNITY
Start: 2023-05-02

## 2023-05-02 RX ORDER — TOPIRAMATE 25 MG/1
TABLET, FILM COATED ORAL
Qty: 90 TABLET | Refills: 3 | Status: SHIPPED | OUTPATIENT
Start: 2023-05-02 | End: 2023-07-20

## 2023-05-02 NOTE — PROGRESS NOTES
David is a 67 year old who is being evaluated via a billable video visit.      How would you like to obtain your AVS? [employer]  If the video visit is dropped, the invitation should be resent by: [phone]        Video-Visit Details    Type of service:  Video Visit   Video Start Time:   Video End Time:    Originating Location (pt. Location): Home    Distant Location (provider location):  Off-site  Platform used for Video Visit: Fredo

## 2023-05-02 NOTE — PATIENT INSTRUCTIONS
Medications     8am   8pm   Acetaminophen tylenol 325mg prn       Amoxicillin amoxil 500mg  dental        Aspirin 325mg stopped       Atorvastatin lipitor 40mg  1       Celecoxib celebrex 200mg stopped     Gabapentin neurontin 300mg (allie tran)     4   Magnesium citrate 150mg 2       Metformin glucophage 500mg 1   2   MVI 1       Naproxen naprosyn 500mg stopped     Nonformularly Vit B12 100mcg, vit b6 46mg, 400 mcg folate 1       Omeprazole prilosec 20mg  1     Oxycodone roxicodone 5mg  stopped     Potassium citrate urocit K 10meq CR 2  2   Propranolol inderal 20mg  stopped       Senna-docusate senokot-S 8.6-50 stopped   1   Tadalafil cialis 5mg prn       Tamsulosin flomax 0.4mg     1    Topiramate topamax 25mg   3   Vitamin D3 cholecalciferol 50 mcg (2000u) 2 (winter)       Zinc 25mg 2                Plan:  Tremors are better with topiramate (topamax)  Has improvement with topiramate 3 x 25mg at night   Aware of stone risks  Wishes to stay on this medication and not increase the dose  Medication was refilled.     Has neuropathy   Taking gabapentin 4 x 300mg at night   Has diabetes with A1c was 6.0  He is getting this from allie RODRIGUEZ from Dr. Lewis's o ffice    Last visit was 12/2021    Return visit  ~ 1 year or so.

## 2023-05-02 NOTE — LETTER
5/2/2023       RE: Damien Dacosta Jr.  [address]  [place] [address]     Dear Colleague,    Thank you for referring your patient, Damien Dacosta Jr., to the [facility] [place] at [facility]. Please see a copy of my visit note below.        VIDEO VISIT    Date of Visit: May 2, 2023  Name: Damien Dacosta Jr.  Date of Birth 1955  [address]   [place] [address]   [email or web address]  [phone] (M)   [phone] (H)   [phone] (W)   María Dacosta  [id number]    [phone]     He is on propranolol and his heart rate is 50 and he has not noticed much benefit. He may want to stop the daily use of propranolol due to its limited benefit and use it as needed. I am not sure we can increase the dose of this medication further given his low heart rate. He had been seen by a neurologist in the past: Dr. Gabriella Elena      Discussed topiramate/topamax and primidone/mysoline and discussed having Joaquina Melgar's input from pharmacy     Discussed occupational therapy - and made referral     Discussed deep brain stimulation (DBS)     [email or web address]    Assessment:  (G25.9) Movement disorder  (primary encounter diagnosis)  (R25.1) Tremor  Tremor - diagnoses of essential tremor.   Propranolol inderal 20mg twice daily  - stopped  Started on propranolol in December 2020  Tremor duration 3 years  Right handed  Bilateral hand tremor  Handwriting is affected. shakey  Utensils - difficulties with fork   May need two hands to hold a cup  He has not dropped things but has significant shaking.   He has a bit of head tremor.   Alleviating factors - no clear - not sure if alcohol helps.   Tremor is better in the morning and increased after working out.   Has had problems with putting - short puts.      (G62.9) Neuropathy  Gabapentin neurontin 300mg x4 at night.   For neuropathy  May have some creepy crawling  leg feelings   The gabapentin is helpful for his neuropathy   If he forgets to take it he cannot fall asleep.     Review of diagnosis        Review of diagnosis    Tremor  neuropathy    Avoidance of dopamine blockers   Not taking    Motor complication review   n/a    Review of Impulse control disorders   Not     Review of surgical or medication options   reviewed    Gait/Balance/Falls   No falls or balance    Exercise/Therapy performed/offered   Exercising -  Playing golf  Does personal training weekly  Does physical th erapy for his hip - had hip replacement last fall  Elliptical and rowing    Cognitive/Driving   Denies  retired    Mood   Mood is fine    Hallucinations/delusions   denies    Sleep   able to fall asleep relatively quickly within 15-20 minutes and sleeps through the night.   He gets up to urinate once nightly  Snores a bit  No loss of smell  No talking in his sleep  Goes to bed around 1030p and wakes up 7am  Taking gabapentin helps his sleep     Bladder/Renal/Prostate/Gyn/Other  he has been diagnosed with bph - shaved the prostate  Bladder stone on 11/26/2021  Not sure as to etiology of bladder stone but presumed not linked to increased risk of kidney stone  He had procedure to alleviate the altered opening for his urinary problems.   No renal stones    GI/Constipation/GERD   Omeprazole prilosec 20mg  Senna-docusate senokot-S 8.6-50 -  Not taking  Regular bowels     ENDO/Lipid/DM/Bone density/Thyroid  Metformin glucophage 500mg  In am and 2 in pm. Has 5.8 a1c and 130s   Has a bit of neuropathy. No renal or eye problems  Has some involvement in the bottom of his feet.   a1c 5.9 on 11/18/2021  6.0 on 1/18/2023     Cardio/heart/Hyper or Hypotensive   ECG 11/26/2021  Last stress echocardiogram - 10/21/2014  No issues    Vision/Dry Eyes/Cataracts/Glaucoma/Macular   Has not had cataract     Heme/Anticoagulation/Antiplatelet/Anemia/Other  Aspirin 325mg - not taking  B12 methylcobalamin taking      ENT/Resp  No loss of smell  Had covid in the past  2022 or so     Skin/Cancer/Seborrhea/other  No skin cancer  Precancerous lesions removed    Musculoskeletal/Pain/Headache  Hip surgery 10/2022  Walking fine     Other:  Acetaminophen tylenol 325mg as needed   Oxycodone IR roxicodone 5mg - not taking      Amoxicillin amoxil 500mg prior to dental      Medications     8am   8pm   Acetaminophen tylenol 325mg prn       Amoxicillin amoxil 500mg  dental        Aspirin 325mg stopped       Atorvastatin lipitor 40mg  1       Celecoxib celebrex 200mg stopped     Gabapentin neurontin 300mg (allie tran)     4   Magnesium citrate 150mg 2       Metformin glucophage 500mg 1   2   MVI 1       Naproxen naprosyn 500mg stopped     Nonformularly Vit B12 100mcg, vit b6 46mg, 400 mcg folate 1       Omeprazole prilosec 20mg  1     Oxycodone roxicodone 5mg  stopped     Potassium citrate urocit K 10meq CR 2  2   Propranolol inderal 20mg  stopped       Senna-docusate senokot-S 8.6-50 stopped   1   Tadalafil cialis 5mg prn       Tamsulosin flomax 0.4mg     1    Topiramate topamax 25mg   3   Vitamin D3 cholecalciferol 50 mcg (2000u) 2 (winter)       Zinc 25mg 2                Plan:  Tremors are better with topiramate (topamax)  Has improvement with topiramate 3 x 25mg at night   Aware of stone risks  Wishes to stay on this medication and not increase the dose  Medication was refilled.     Has neuropathy   Taking gabapentin 4 x 300mg at night   Has diabetes with A1c was 6.0  He is getting this from allie RODRIGUEZ from Dr. Lewis's o ffice    Last visit was 12/2021    Return visit  ~ 1 year or so.           Medical Decision Making:  #  Chronic progressive medical conditions addressed  Neuropathy and tremor  Review and/or interpretation of unique test or documentation from a provider outside of neurology yes a1c   Independent historian provided additional details  no   Prescription drug management and review of potential side effects and/or  monitoring for side effects  yes   Health impacted by social determinants of health  no    I have reviewed the note as documented above.  This accurately captures the substance of my conversation with the patient and total time spent preparing for visit, executing visit and completing visit on the day of the visit:  20 minutes.     Jonathan Delgado MD      ----------------------------------------------------------------------------------------------------------------------------------------------    --------------------------------------------------------------------------------------------------------------    Damien Dacosta Miguel A is a 67 year old male who is being evaluated via a billable video visit.      Charts reviewed  Consult from  Images reviewed        I have reviewed and updated the patient's Past Medical History, Social History, Family History and Medication List.    ALLERGIES  Shellfish allergy    Lasts visit details if there was a last visit:       14 Review of systems  are negative except for   Patient Active Problem List   Diagnosis    Numbness and tingling in left arm    Musculoskeletal disorder involving upper trapezius muscle    Right knee pain    Left knee pain    S/P total knee replacement using cement, right    Acute pain of right knee    Knee effusion, right    S/P total knee replacement using cement, left    Benign prostatic hyperplasia with urinary frequency    Family history of cardiovascular disease    Routine general medical examination at a health care facility    Type 2 diabetes, controlled, with peripheral neuropathy (H)    Hyperlipidemia    Polyneuropathy associated with underlying disease (H)    Increased frequency of urination    Diabetes mellitus (H)    Microalbuminuria due to type 2 diabetes mellitus (H)        Allergies   Allergen Reactions    Shellfish Allergy Anaphylaxis and Other (See Comments)     Past Surgical History:   Procedure Laterality Date    ARTHROPLASTY KNEE Right 09/20/2016     Procedure: ARTHROPLASTY KNEE;  Surgeon: Tommie Gramajo MD;  Location:  OR    ARTHROPLASTY KNEE Left 03/15/2018    Procedure: ARTHROPLASTY KNEE;  LEFT TOTAL KNEE ARTHROPLASTY;  Surgeon: Tommie Gramajo MD;  Location:  OR    COLONOSCOPY      CYSTOSCOPY PROSTATE W/ LASER N/A 11/26/2021    Procedure: CYSTOSCOPY WITH QUANTA LASER TRANSURETHRAL INCISION OF THE PROSTATE;  Surgeon: Arsh Herbert MD;  Location: [facility] [place] OR    CYSTOSCOPY, WITH LITHOLAPAXY USING HOLMIUM LASER N/A 11/26/2021    Procedure: CYSTOLITHOLAPAXY;  Surgeon: Arsh Herbert MD;  Location: [facility] [place] OR    ENT SURGERY      tonsillectomy    GENITOURINARY SURGERY  2012    laser TURP    ORTHOPEDIC SURGERY Bilateral     knee arthroscopy    other surgery  11/26/2021    bladder stone -     Past Medical History:   Diagnosis Date    Arthritis     osteoarthrosis of lower keg    Diabetes (H)     type 2    Hyperlipidemia      Social History     Socioeconomic History    Marital status:      Spouse name: Not on file    Number of children: Not on file    Years of education: Not on file    Highest education level: Not on file   Occupational History    Not on file   Tobacco Use    Smoking status: Former     Types: Cigars, Dip, chew, snus or snuff    Smokeless tobacco: Current     Types: Chew    Tobacco comments:     quit cigs 20 years ago   Vaping Use    Vaping status: Not on file   Substance and Sexual Activity    Alcohol use: Yes     Comment: 2-4/ week    Drug use: No    Sexual activity: Not on file   Other Topics Concern    Parent/sibling w/ CABG, MI or angioplasty before 65F 55M? 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 onset 20 years before passed away    Other - See Comments Mother         head and hand tremor    Prostate Cancer Father     Coronary Artery Disease Father     Heart Failure Father     Diabetes Father     Cancer Sister         bone cancer, born 150    Tremor Sister         present for 5 years - 2015    Prostate Cancer Brother         born 1948    Tremor Brother         5 years - 2015    Tremor Maternal Grandfather         hand when using hands. long standing    Other - See Comments Zion roberts    Other - See Comments Zion roberts    Other - See Comments Daughter [employer]     Current Outpatient Medications   Medication Sig Dispense Refill    amoxicillin (AMOXIL) 500 MG tablet Take 4 tabs oral 1 hour before dental work      atorvastatin (LIPITOR) 40 MG tablet Take 40 mg by mouth every morning       celecoxib (CELEBREX) 200 MG capsule Dose?      gabapentin (NEURONTIN) 300 MG capsule 4 x 300mg capsules (1200mg) by mouth at bedtime      MAGNESIUM CITRATE PO 2 tabs by mouth daily (each contains 150mg) = 300mg/day      metFORMIN (GLUCOPHAGE) 500 MG tablet TAKE 1 TABLET EVERY MORNING AND 2 TABLETS AT BEDTIME      multivitamin (ONE-DAILY) tablet Take 1 tablet by mouth daily      naproxen (NAPROSYN) 500 MG tablet Dose?      NONFORMULARY 1 tab by mouth daily 1100 mcg vitamin B12, 46 mg vitamin B6, 400 mcg folate      omeprazole (PRILOSEC) 20 MG DR capsule Take 20 mg by mouth daily      oxyCODONE (ROXICODONE) 5 MG tablet Take 1-2 tablets (5-10 mg) by mouth every 6 hours as needed for severe pain 10 tablet 0    potassium citrate (UROCIT-K) 10 MEQ (1080 MG) CR tablet 2 tabs twice daily      potassium citrate (UROCIT-K) 10 MEQ (1080 MG) CR tablet Dose?      tadalafil  (CIALIS) 5 MG tablet Take 5 mg by mouth daily       tamsulosin (FLOMAX) 0.4 MG capsule Take 1 capsule by mouth every evening      topiramate (TOPAMAX) 25 MG tablet TAKE 3 TABLETS NIGHTLY 90 tablet 0    vitamin D3 (CHOLECALCIFEROL) 50 mcg (2000 units) tablet Take 2 tablets by mouth daily       Zinc 25 MG TABS 2 x 25mg tabs by mouth daily         Again, thank you for allowing me to participate in the care of your patient.      Sincerely,    Jonathan Delgado MD

## 2023-07-19 DIAGNOSIS — R25.1 TREMOR: ICD-10-CM

## 2023-07-20 RX ORDER — TOPIRAMATE 25 MG/1
TABLET, FILM COATED ORAL
Qty: 90 TABLET | Refills: 11 | Status: SHIPPED | OUTPATIENT
Start: 2023-07-20 | End: 2024-01-26

## 2023-07-20 NOTE — TELEPHONE ENCOUNTER
Rx Authorization:  Requested Medication/ Dose: Topiramate TABS 25MG  Date last refill ordered:   Quantity ordered:   # refills:   Date of last clinic visit with ordering provider:   Date of next clinic visit with ordering provider:   All pertinent protocol data (lab date/result):   Include pertinent information from patients message:

## 2023-07-20 NOTE — TELEPHONE ENCOUNTER
Medication and strength: Topiramate 25 mg    Is this a [facility] patient? Yes    Last appointment: 5/2/2023 with Dr. Delgado    Next appointment: 4/9/2024    Last re-ordered: 5/2/2023 for a 30 day supply with 3 refills    Action taken: Sent to be signed by the provider

## 2023-08-26 ENCOUNTER — HEALTH MAINTENANCE LETTER (OUTPATIENT)
Age: 68
End: 2023-08-26

## 2024-01-02 RX ORDER — IBUPROFEN 800 MG/1
TABLET, FILM COATED ORAL
COMMUNITY
Start: 2023-10-09 | End: 2024-02-26

## 2024-01-02 NOTE — PROGRESS NOTES
Diagnosis/Summary/Recommendations:    PATIENT: Damien Dacosta Jr.  68 year old male     : 1955    VALERIANO: 2024    MRN: [id number]  [address]   [facility] [id number]   [email or web address]  [phone] (M)   [phone] (H)   [phone] (W)   María Dacosta  [id number]     [phone]       [email or web address]     Assessment:  (G25.9) Movement disorder  (primary encounter diagnosis)  (R25.1) Tremor  Tremor - diagnoses of essential tremor.   Propranolol inderal 20mg twice daily  - stopped  Started on propranolol in 2020  Tremor duration 3 years  Right handed  Bilateral hand tremor  Handwriting is affected. shakey  Utensils - difficulties with fork   May need two hands to hold a cup  He has not dropped things but has significant shaking.   He has a bit of head tremor.   Alleviating factors - no clear - not sure if alcohol helps.   Tremor is better in the morning and increased after working out.   Has had problems with putting - short puts.      (G62.9) Neuropathy  Gabapentin neurontin 300mg x4 at night.   For neuropathy  May have some creepy crawling leg feelings   The gabapentin is helpful for his neuropathy   If he forgets to take it he cannot fall asleep.     Review of diagnosis          Review of diagnosis    Tremor  neuropathy     Avoidance of dopamine blockers   Not taking     Motor complication review   n/a     Review of Impulse control disorders   Not      Review of surgical or medication options   reviewed     Gait/Balance/Falls   No falls or balance     Exercise/Therapy performed/offered   Exercising -  Playing golf  Does personal training weekly  Does physical th erapy for his hip - had hip replacement last fall  Elliptical and rowing     Cognitive/Driving   Denies  retired     Mood   Mood is fine     Hallucinations/delusions   denies     Sleep   able to fall asleep relatively quickly within 15-20 minutes and sleeps through the night.   He gets up to  urinate once nightly  Snores a bit  No loss of smell  No talking in his sleep  Goes to bed around 1030p and wakes up 7am  Taking gabapentin helps his sleep      Bladder/Renal/Prostate/Gyn/Other  he has been diagnosed with bph - shaved the prostate  Bladder stone on 11/26/2021  Not sure as to etiology of bladder stone but presumed not linked to increased risk of kidney stone  He had procedure to alleviate the altered opening for his urinary problems.   No renal stones     GI/Constipation/GERD   Omeprazole prilosec 20mg  Senna-docusate senokot-S 8.6-50 -  Not taking  Regular bowels      ENDO/Lipid/DM/Bone density/Thyroid  Metformin glucophage 500mg  In am and 2 in pm. Has 5.8 a1c and 130s   Has a bit of neuropathy. No renal or eye problems  Has some involvement in the bottom of his feet.   a1c 5.9 on 11/18/2021  6.0 on 1/18/2023     Cardio/heart/Hyper or Hypotensive   ECG 11/26/2021  Last stress echocardiogram - 10/21/2014  No issues     Vision/Dry Eyes/Cataracts/Glaucoma/Macular   Has not had cataract      Heme/Anticoagulation/Antiplatelet/Anemia/Other  Aspirin 325mg - not taking  B12 methylcobalamin taking      ENT/Resp  No loss of smell  Had covid in the past  2022 or so      Skin/Cancer/Seborrhea/other  No skin cancer  Precancerous lesions removed     Musculoskeletal/Pain/Headache  Hip surgery 10/2022  Walking fine      Other:  Acetaminophen tylenol 325mg as needed   Oxycodone IR roxicodone 5mg - not taking      Amoxicillin amoxil 500mg prior to dental     Plan:  Tremors are better with topiramate (topamax)  Has improvement with topiramate 3 x 25mg at night   Aware of stone risks  Wishes to stay on this medication and not increase the dose  Medication was refilled.      Has neuropathy   Taking gabapentin 4 x 300mg at night   Has diabetes with A1c was 6.0  He is getting this from allie RODRIGUEZ from Dr. [facility] o ffice     Last visit was 12/2021     Return visit  ~ 1 year or so.      He is on propranolol and his  heart rate is 50 and he has not noticed much benefit. He may want to stop the daily use of propranolol due to its limited benefit and use it as needed. I am not sure we can increase the dose of this medication further given his low heart rate. He had been seen by a neurologist in the past: Dr. Gabriella Elena      topiramate/topamax and primidone/mysoline and discussed having Joaquina Melgar's input from pharmacy     Discussed occupational therapy - and made referral     Discussed deep brain stimulation (DBS)       Medications     8am   8pm   Acetaminophen tylenol 325mg prn       Amoxicillin amoxil 500mg  dental        Atorvastatin lipitor 40mg  1       Gabapentin neurontin 300mg (allie tran)     4   Magnesium citrate 150mg 2       Metformin glucophage 500mg 1   2   MVI 1       Non Vit B12 100mcg, vit b6 46mg, 400 mcg folate 1       Omeprazole prilosec 20mg  1       Potassium citrate urocit K 10meq CR 2   2   Propranolol inderal 20mg  stopped       Senna-docusate senokot-S 8.6-50 stopped   1   Tadalafil cialis 5mg prn       Tamsulosin flomax 0.4mg     1    Topiramate topamax 25mg     3   Vit D3 cholecalciferol 50 mcg (2000u) 2 (winter)       Zinc 25mg 2                      Plan:    Last visit was 5/2023    He had a bladder stone 2021 - prior to topiramate  Has not had a renal stone since being on topiramate  He sees a urologist - [facility]  Will be getting a potassium level on Monday  Urinary habits are okay   Night time gets up once per night - sometimes he has some hesitancy     He has a low heart rate and did not have much benefit from propranolol and is not on this.   He could try it again - 10mg as needed in the evening but will forego    His tremor is okay in the morning but at the end of the day the tremor is more noticeable and may affect his ability to use utensils.     He had tried topiramate topamax a few days taking in the morning and was not sure the tremor is better    His tremor may be worse later in  the day due to fatigue and after exercise    Discussed primidone/mysoline which could be added but has side effects    As his tremor has worsened     IETF international Essential Tremor Foundation   Junito Wray - tremor research   [facility]   [email or web address]    Pharmacy ([facility]) consultation and medication management  Please call the scheduling number @ [phone] to set up an appointment with pharmacists Joaquina Alvarez or Karolyn Cameron.     Discussed research - [employer], [employer], [employer]     He may increase to 4/day - could  take it 2 pills twice daily     Going [place] - see brother and sister  Go in a week     Return 1  year or so         Coding statement:   Medical Decision Making:  #  Chronic progressive medical conditions addressed  - see above --   Review and/or interpretation of unique test or documentation from a provider outside of neurology no   Independent historian provided additional details  yes I  Prescription drug management and review of potential side effects and/or monitoring for side effects  -- see above ---  Health impacted by social determinants of health  no    I have reviewed the note as documented above.  This accurately captures the substance of my conversation with the patient and total time spent preparing for visit, executing visit and completing visit on the day of the visit:  30 minutes.  The portion of this total time included face to face time 29 minutes      Jonathan Delgado MD     ______________________________________    Last visit date and details:             ______________________________________      Patient was asked about 14 Review of systems including changes in vision (dry eyes, double vision), hearing, heart, lungs, musculoskeletal, depression, anxiety, snoring, RBD, insomnia, urinary frequency, urinary urgency, constipation, swallowing problems, hematological, ID, allergies, skin problems: seborrhea, endocrinological: thyroid, diabetes,  cholesterol; balance, weight changes, and other neurological problems and these were not significant at this time except for   Patient Active Problem List   Diagnosis    Numbness and tingling in left arm    Musculoskeletal disorder involving upper trapezius muscle    Right knee pain    Left knee pain    S/P total knee replacement using cement, right    Acute pain of right knee    Knee effusion, right    S/P total knee replacement using cement, left    Benign prostatic hyperplasia with urinary frequency    Family history of cardiovascular disease    Routine general medical examination at a health care facility    Type 2 diabetes, controlled, with peripheral neuropathy (H)    Hyperlipidemia    Polyneuropathy associated with underlying disease (H24)    Increased frequency of urination    Diabetes mellitus (H)    Microalbuminuria due to type 2 diabetes mellitus (H)          Allergies   Allergen Reactions    Shellfish Allergy Anaphylaxis and Other (See Comments)     Past Surgical History:   Procedure Laterality Date    ARTHROPLASTY KNEE Right 09/20/2016    Procedure: ARTHROPLASTY KNEE;  Surgeon: Tommie Gramajo MD;  Location:  OR    ARTHROPLASTY KNEE Left 03/15/2018    Procedure: ARTHROPLASTY KNEE;  LEFT TOTAL KNEE ARTHROPLASTY;  Surgeon: Tommie Gramajo MD;  Location:  OR    COLONOSCOPY      CYSTOSCOPY PROSTATE W/ LASER N/A 11/26/2021    Procedure: CYSTOSCOPY WITH QUANTA LASER TRANSURETHRAL INCISION OF THE PROSTATE;  Surgeon: Arsh Herbert MD;  Location: [facility] [place]    CYSTOSCOPY, WITH LITHOLAPAXY USING HOLMIUM LASER N/A 11/26/2021    Procedure: CYSTOLITHOLAPAXY;  Surgeon: Arsh Herbert MD;  Location: [facility] [place]    ENT SURGERY      tonsillectomy    GENITOURINARY SURGERY  2012    laser TURP    hip surgery Right 10/10/2022    right hip surgery    ORTHOPEDIC SURGERY Bilateral     knee arthroscopy    other surgery  11/26/2021    bladder stone -     Past Medical History:   Diagnosis Date     Arthritis     osteoarthrosis of lower keg    Diabetes (H)     type 2    Hyperlipidemia      Social History     Socioeconomic History    Marital status:      Spouse name: Not on file    Number of children: Not on file    Years of education: Not on file    Highest education level: Not on file   Occupational History    Not on file   Tobacco Use    Smoking status: Former     Types: Cigars, Dip, chew, snus or snuff    Smokeless tobacco: Current     Types: Chew    Tobacco comments:     quit cigs 20 years ago   Substance and Sexual Activity    Alcohol use: Yes     Comment: 2-4/ week    Drug use: No    Sexual activity: Not on file   Other Topics Concern    Parent/sibling w/ CABG, MI or angioplasty before 65F 55M? 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        Wife is extended furlough -         3 kids - 2 sons and 1 daughter     Social Determinants of Health     Financial Resource Strain: Not on file   Food Insecurity: Not on file   Transportation Needs: Not on file   Physical Activity: Not on file   Stress: Not on file   Social Connections: Not on file   Interpersonal Safety: Not on file   Housing Stability: Not on file       Drug and lactation database from the [place] [employer] of Medicine:  http://toxnet.nlm.nih.gov/cgi-bin/sis/htmlgen?LACT      B/P: Data Unavailable, T: Data Unavailable, P: Data Unavailable, R: Data Unavailable 0 lbs 0 oz  There were no vitals taken for this visit., There is no height or weight on file to calculate BMI.  Medications and Vitals not listed above were documented in the cart and reviewed by me.     Current Outpatient Medications   Medication Sig Dispense Refill    ibuprofen (ADVIL/MOTRIN) 800 MG tablet       amoxicillin (AMOXIL) 500 MG tablet Take 4 tabs oral 1 hour before dental work      atorvastatin (LIPITOR) 40 MG tablet Take 40 mg by mouth every morning       gabapentin (NEURONTIN) 300 MG capsule 4 x 300mg capsules (1200mg)  by mouth at bedtime      MAGNESIUM CITRATE PO 2 tabs by mouth daily (each contains 150mg) = 300mg/day      metFORMIN (GLUCOPHAGE) 500 MG tablet TAKE 1 TABLET EVERY MORNING AND 2 TABLETS AT BEDTIME      multivitamin (ONE-DAILY) tablet Take 1 tablet by mouth daily      NONFORMULARY 1 tab by mouth daily 1100 mcg vitamin B12, 46 mg vitamin B6, 400 mcg folate      omeprazole (PRILOSEC) 20 MG DR capsule Take 20 mg by mouth daily      potassium citrate (UROCIT-K) 10 MEQ (1080 MG) CR tablet Take 20 mEq by mouth 2 times daily      tadalafil (CIALIS) 5 MG tablet Take 5 mg by mouth daily      tamsulosin (FLOMAX) 0.4 MG capsule Take 1 capsule by mouth every evening      topiramate (TOPAMAX) 25 MG tablet TAKE 3 TABLETS NIGHTLY 90 tablet 11    vitamin D3 (CHOLECALCIFEROL) 50 mcg (2000 units) tablet 2 tabs daily (winter only)      Zinc 25 MG TABS 2 x 25mg tabs by mouth daily           Jonathan Delgado MD

## 2024-01-13 ENCOUNTER — HEALTH MAINTENANCE LETTER (OUTPATIENT)
Age: 69
End: 2024-01-13

## 2024-01-26 ENCOUNTER — OFFICE VISIT (OUTPATIENT)
Dept: NEUROLOGY | Facility: CLINIC | Age: 69
End: 2024-01-26
Payer: COMMERCIAL

## 2024-01-26 VITALS
SYSTOLIC BLOOD PRESSURE: 122 MMHG | OXYGEN SATURATION: 96 % | HEART RATE: 54 BPM | DIASTOLIC BLOOD PRESSURE: 67 MMHG | RESPIRATION RATE: 16 BRPM

## 2024-01-26 DIAGNOSIS — R25.1 TREMOR: Primary | ICD-10-CM

## 2024-01-26 PROCEDURE — 99214 OFFICE O/P EST MOD 30 MIN: CPT | Performed by: PSYCHIATRY & NEUROLOGY

## 2024-01-26 RX ORDER — L. ACIDOPHILUS/BIFIDO. LONGUM 15 MG
CAPSULE,DELAYED RELEASE (ENTERIC COATED) ORAL
COMMUNITY
Start: 2023-11-30

## 2024-01-26 RX ORDER — GINSENG 100 MG
1 CAPSULE ORAL DAILY
COMMUNITY
Start: 2023-12-13 | End: 2024-02-26

## 2024-01-26 RX ORDER — TOPIRAMATE 25 MG/1
TABLET, FILM COATED ORAL
Qty: 360 TABLET | Refills: 11 | Status: SHIPPED | OUTPATIENT
Start: 2024-01-26

## 2024-01-26 ASSESSMENT — PAIN SCALES - GENERAL: PAINLEVEL: NO PAIN (0)

## 2024-01-26 NOTE — LETTER
2024       RE: Damien Dacosta Jr.  [address] [facility] [address]     Dear Colleague,    Thank you for referring your patient, Damien Dacosta Jr., to the [facility] NEUROLOGY CLINIC [place] [facility]. Please see a copy of my visit note below.        Diagnosis/Summary/Recommendations:    PATIENT: Damien Dacosta Jr.  68 year old male     : 1955    VALERIANO: 2024    MRN: [id number]  [address] [facility] [address]   [email or web address]  [phone] (M)   [phone] (H)   [phone] (W)   María Dacosta  [id number]     [phone]       [email or web address]     Assessment:  (G25.9) Movement disorder  (primary encounter diagnosis)  (R25.1) Tremor  Tremor - diagnoses of essential tremor.   Propranolol inderal 20mg twice daily  - stopped  Started on propranolol in 2020  Tremor duration 3 years  Right handed  Bilateral hand tremor  Handwriting is affected. shakey  Utensils - difficulties with fork   May need two hands to hold a cup  He has not dropped things but has significant shaking.   He has a bit of head tremor.   Alleviating factors - no clear - not sure if alcohol helps.   Tremor is better in the morning and increased after working out.   Has had problems with putting - short puts.      (G62.9) Neuropathy  Gabapentin neurontin 300mg x4 at night.   For neuropathy  May have some creepy crawling leg feelings   The gabapentin is helpful for his neuropathy   If he forgets to take it he cannot fall asleep.     Review of diagnosis          Review of diagnosis    Tremor  neuropathy     Avoidance of dopamine blockers   Not taking     Motor complication review   n/a     Review of Impulse control disorders   Not      Review of surgical or medication options   reviewed     Gait/Balance/Falls   No falls or balance     Exercise/Therapy performed/offered   Exercising -  Playing golf  Does  personal training weekly  Does physical th erapy for his hip - had hip replacement last fall  Elliptical and rowing     Cognitive/Driving   Denies  retired     Mood   Mood is fine     Hallucinations/delusions   denies     Sleep   able to fall asleep relatively quickly within 15-20 minutes and sleeps through the night.   He gets up to urinate once nightly  Snores a bit  No loss of smell  No talking in his sleep  Goes to bed around 1030p and wakes up 7am  Taking gabapentin helps his sleep      Bladder/Renal/Prostate/Gyn/Other  he has been diagnosed with bph - shaved the prostate  Bladder stone on 11/26/2021  Not sure as to etiology of bladder stone but presumed not linked to increased risk of kidney stone  He had procedure to alleviate the altered opening for his urinary problems.   No renal stones     GI/Constipation/GERD   Omeprazole prilosec 20mg  Senna-docusate senokot-S 8.6-50 -  Not taking  Regular bowels      ENDO/Lipid/DM/Bone density/Thyroid  Metformin glucophage 500mg  In am and 2 in pm. Has 5.8 a1c and 130s   Has a bit of neuropathy. No renal or eye problems  Has some involvement in the bottom of his feet.   a1c 5.9 on 11/18/2021  6.0 on 1/18/2023     Cardio/heart/Hyper or Hypotensive   ECG 11/26/2021  Last stress echocardiogram - 10/21/2014  No issues     Vision/Dry Eyes/Cataracts/Glaucoma/Macular   Has not had cataract      Heme/Anticoagulation/Antiplatelet/Anemia/Other  Aspirin 325mg - not taking  B12 methylcobalamin taking      ENT/Resp  No loss of smell  Had covid in the past  2022 or so      Skin/Cancer/Seborrhea/other  No skin cancer  Precancerous lesions removed     Musculoskeletal/Pain/Headache  Hip surgery 10/2022  Walking fine      Other:  Acetaminophen tylenol 325mg as needed   Oxycodone IR roxicodone 5mg - not taking      Amoxicillin amoxil 500mg prior to dental     Plan:  Tremors are better with topiramate (topamax)  Has improvement with topiramate 3 x 25mg at night   Aware of stone  risks  Wishes to stay on this medication and not increase the dose  Medication was refilled.      Has neuropathy   Taking gabapentin 4 x 300mg at night   Has diabetes with A1c was 6.0  He is getting this from allie Salinas - [place] from Dr. Lewis's o ffice     Last visit was 12/2021     Return visit  ~ 1 year or so.      He is on propranolol and his heart rate is 50 and he has not noticed much benefit. He may want to stop the daily use of propranolol due to its limited benefit and use it as needed. I am not sure we can increase the dose of this medication further given his low heart rate. He had been seen by a neurologist in the past: Dr. Gabriella Elena      topiramate/topamax and primidone/mysoline and discussed having Joaquina Melgar's input from pharmacy     Discussed occupational therapy - and made referral     Discussed deep brain stimulation (DBS)       Medications     8am   8pm   Acetaminophen tylenol 325mg prn       Amoxicillin amoxil 500mg  dental        Atorvastatin lipitor 40mg  1       Gabapentin neurontin 300mg (allie salinas)     4   Magnesium citrate 150mg 2       Metformin glucophage 500mg 1   2   MVI 1       Non Vit B12 100mcg, vit b6 46mg, 400 mcg folate 1       Omeprazole prilosec 20mg  1       Potassium citrate urocit K 10meq CR 2   2   Propranolol inderal 20mg  stopped       Senna-docusate senokot-S 8.6-50 stopped   1   Tadalafil cialis 5mg prn       Tamsulosin flomax 0.4mg     1    Topiramate topamax 25mg     3   Vit D3 cholecalciferol 50 mcg (2000u) 2 (winter)       Zinc 25mg 2                      Plan:    Last visit was 5/2023    He had a bladder stone 2021 - prior to topiramate  Has not had a renal stone since being on topiramate  He [facility]rolog[facility]  Will be getting a potassium level on Monday  Urinary habits are okay   Night time gets up once per night - sometimes he has some hesitancy     He has a low heart rate and did not have much benefit from propranolol and is not on this.   He  could try it again - 10mg as needed in the evening but will forego    His tremor is okay in the morning but at the end of the day the tremor is more noticeable and may affect his ability to use utensils.     He had tried topiramate topamax a few days taking in the morning and was not sure the tremor is better    His tremor may be worse later in the day due to fatigue and after exercise    Discussed primidone/mysoline which could be added but has side effects    As his tremor has worsened     IETF international Essential Tremor Foundation   Junito Wray - tremor research   [facility]   [email or web address]    Pharmacy (MTM) consultation and medication management  Please call the scheduling number @ [phone] to set up an appointment with pharmacists Joaquina Alvarez or Karolyn Cameron.     Discussed research - [employer], [employer], [employer]     He may increase to 4/day - could  take it 2 pills twice daily     Going [place] - see brother and sister  Go in a week     Return 1  year or so         Coding statement:   Medical Decision Making:  #  Chronic progressive medical conditions addressed  - see above --   Review and/or interpretation of unique test or documentation from a provider outside of neurology no   Independent historian provided additional details  yes I  Prescription drug management and review of potential side effects and/or monitoring for side effects  -- see above ---  Health impacted by social determinants of health  no    I have reviewed the note as documented above.  This accurately captures the substance of my conversation with the patient and total time spent preparing for visit, executing visit and completing visit on the day of the visit:  30 minutes.  The portion of this total time included face to face time 29 minutes      Jonathan Delgado MD     ______________________________________    Last visit date and details:              ______________________________________      Patient was asked about 14 Review of systems including changes in vision (dry eyes, double vision), hearing, heart, lungs, musculoskeletal, depression, anxiety, snoring, RBD, insomnia, urinary frequency, urinary urgency, constipation, swallowing problems, hematological, ID, allergies, skin problems: seborrhea, endocrinological: thyroid, diabetes, cholesterol; balance, weight changes, and other neurological problems and these were not significant at this time except for   Patient Active Problem List   Diagnosis    Numbness and tingling in left arm    Musculoskeletal disorder involving upper trapezius muscle    Right knee pain    Left knee pain    S/P total knee replacement using cement, right    Acute pain of right knee    Knee effusion, right    S/P total knee replacement using cement, left    Benign prostatic hyperplasia with urinary frequency    Family history of cardiovascular disease    Routine general medical examination at a health care facility    Type 2 diabetes, controlled, with peripheral neuropathy (H)    Hyperlipidemia    Polyneuropathy associated with underlying disease (H24)    Increased frequency of urination    Diabetes mellitus (H)    Microalbuminuria due to type 2 diabetes mellitus (H)          Allergies   Allergen Reactions    Shellfish Allergy Anaphylaxis and Other (See Comments)     Past Surgical History:   Procedure Laterality Date    ARTHROPLASTY KNEE Right 09/20/2016    Procedure: ARTHROPLASTY KNEE;  Surgeon: Tommie Gramajo MD;  Location:  OR    ARTHROPLASTY KNEE Left 03/15/2018    Procedure: ARTHROPLASTY KNEE;  LEFT TOTAL KNEE ARTHROPLASTY;  Surgeon: Tommie Gramajo MD;  Location:  OR    COLONOSCOPY      CYSTOSCOPY PROSTATE W/ LASER N/A 11/26/2021    Procedure: CYSTOSCOPY WITH QUANTA LASER TRANSURETHRAL INCISION OF THE PROSTATE;  Surgeon: Arsh Herbert MD;  [facility]    CYSTOSCOPY, WITH LITHOLAPAXY USING  HOLMIUM LASER N/A 11/26/2021    Procedure: CYSTOLITHOLAPAXY;  Surgeon: Arsh Herbert MD;  Location: [facility] OR    ENT SURGERY      tonsillectomy    GENITOURINARY SURGERY  2012    laser TURP    hip surgery Right 10/10/2022    right hip surgery    ORTHOPEDIC SURGERY Bilateral     knee arthroscopy    other surgery  11/26/2021    bladder stone -     Past Medical History:   Diagnosis Date    Arthritis     osteoarthrosis of lower keg    Diabetes (H)     type 2    Hyperlipidemia      Social History     Socioeconomic History    Marital status:      Spouse name: Not on file    Number of children: Not on file    Years of education: Not on file    Highest education level: Not on file   Occupational History    Not on file   Tobacco Use    Smoking status: Former     Types: Cigars, Dip, chew, snus or snuff    Smokeless tobacco: Current     Types: Chew    Tobacco comments:     quit cigs 20 years ago   Substance and Sexual Activity    Alcohol use: Yes     Comment: 2-4/ week    Drug use: No    Sexual activity: Not on file   Other Topics Concern    Parent/sibling w/ CABG, MI or angioplasty before 65F 55M? 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        Wife is extended furlough -         3 kids - 2 sons and 1 daughter     Social Determinants of Health     Financial Resource Strain: Not on file   Food Insecurity: Not on file   Transportation Needs: Not on file   Physical Activity: Not on file   Stress: Not on file   Social Connections: Not on file   Interpersonal Safety: Not on file   Housing Stability: Not on file       Drug and lactation database from the [place] National Library of Medicine:  http://toxnet.nlm.nih.gov/cgi-bin/sis/htmlgen?LACT      B/P: Data Unavailable, T: Data Unavailable, P: Data Unavailable, R: Data Unavailable 0 lbs 0 oz  There were no vitals taken for this visit., There is no height or weight on file to calculate BMI.  Medications and Vitals not  listed above were documented in the cart and reviewed by me.     Current Outpatient Medications   Medication Sig Dispense Refill    ibuprofen (ADVIL/MOTRIN) 800 MG tablet       amoxicillin (AMOXIL) 500 MG tablet Take 4 tabs oral 1 hour before dental work      atorvastatin (LIPITOR) 40 MG tablet Take 40 mg by mouth every morning       gabapentin (NEURONTIN) 300 MG capsule 4 x 300mg capsules (1200mg) by mouth at bedtime      MAGNESIUM CITRATE PO 2 tabs by mouth daily (each contains 150mg) = 300mg/day      metFORMIN (GLUCOPHAGE) 500 MG tablet TAKE 1 TABLET EVERY MORNING AND 2 TABLETS AT BEDTIME      multivitamin (ONE-DAILY) tablet Take 1 tablet by mouth daily      NONFORMULARY 1 tab by mouth daily 1100 mcg vitamin B12, 46 mg vitamin B6, 400 mcg folate      omeprazole (PRILOSEC) 20 MG DR capsule Take 20 mg by mouth daily      potassium citrate (UROCIT-K) 10 MEQ (1080 MG) CR tablet Take 20 mEq by mouth 2 times daily      tadalafil (CIALIS) 5 MG tablet Take 5 mg by mouth daily      tamsulosin (FLOMAX) 0.4 MG capsule Take 1 capsule by mouth every evening      topiramate (TOPAMAX) 25 MG tablet TAKE 3 TABLETS NIGHTLY 90 tablet 11    vitamin D3 (CHOLECALCIFEROL) 50 mcg (2000 units) tablet 2 tabs daily (winter only)      Zinc 25 MG TABS 2 x 25mg tabs by mouth daily           Jonathan Delgado MD

## 2024-01-26 NOTE — NURSING NOTE
Chief Complaint   Patient presents with    Tremors     /67 (BP Location: Right arm, Patient Position: Sitting, Cuff Size: Adult Large)   Pulse 54   Resp 16   SpO2 96%     CHALINO CODY

## 2024-01-26 NOTE — PATIENT INSTRUCTIONS
Medications     8am   8pm   Acetaminophen tylenol 325mg prn       Amoxicillin amoxil 500mg  dental        Atorvastatin lipitor 40mg  1       Gabapentin neurontin 300mg (allie tran)     4   Magnesium citrate 150mg 2       Metformin glucophage 500mg 1   2   MVI 1       Non Vit B12 100mcg, vit b6 46mg, 400 mcg folate 1       Omeprazole prilosec 20mg  1       Potassium citrate urocit K 10meq CR 2   2   Propranolol inderal 20mg  stopped       Senna-docusate senokot-S 8.6-50 stopped   1   Tadalafil cialis 5mg prn       Tamsulosin flomax 0.4mg     1    Topiramate topamax 25mg     3   Vit D3 cholecalciferol 50 mcg (2000u) 2 (winter)       Zinc 25mg 2                      Plan:    Last visit was 5/2023    He had a bladder stone 2021 - prior to topiramate  Has not had a renal stone since being on topiramate  He sees a urologist - [facility]  Will be getting a potassium level on Monday  Urinary habits are okay   Night time gets up once per night - sometimes he has some hesitancy     He has a low heart rate and did not have much benefit from propranolol and is not on this.   He could try it again - 10mg as needed in the evening but will forego    His tremor is okay in the morning but at the end of the day the tremor is more noticeable and may affect his ability to use utensils.     He had tried topiramate topamax a few days taking in the morning and was not sure the tremor is better    His tremor may be worse later in the day due to fatigue and after exercise    Discussed primidone/mysoline which could be added but has side effects    As his tremor has worsened     IETF international Essential Tremor Foundation   Junito Wray - tremor research   [facility]   [email or web address]    Pharmacy (MTM) consultation and medication management  Please call the scheduling number @ [phone] to set up an appointment with pharmacists Joaquina Alvarez or Karolyn Cameron.     Discussed research - [employer], [employer]     He may increase to 4/day - could  take it 2 pills twice daily     Going [place] - see brother and sister  Go in a week     Return 1  year or so

## 2024-02-26 ENCOUNTER — VIRTUAL VISIT (OUTPATIENT)
Dept: NEUROLOGY | Facility: CLINIC | Age: 69
End: 2024-02-26
Attending: PSYCHIATRY & NEUROLOGY
Payer: COMMERCIAL

## 2024-02-26 DIAGNOSIS — Z87.442 H/O RENAL CALCULI: ICD-10-CM

## 2024-02-26 DIAGNOSIS — E78.5 HYPERLIPIDEMIA, UNSPECIFIED HYPERLIPIDEMIA TYPE: ICD-10-CM

## 2024-02-26 DIAGNOSIS — N40.1 BENIGN PROSTATIC HYPERPLASIA WITH URINARY FREQUENCY: ICD-10-CM

## 2024-02-26 DIAGNOSIS — R25.1 TREMOR: Primary | ICD-10-CM

## 2024-02-26 DIAGNOSIS — Z78.9 TAKES DIETARY SUPPLEMENTS: ICD-10-CM

## 2024-02-26 DIAGNOSIS — E11.42 TYPE 2 DIABETES, CONTROLLED, WITH PERIPHERAL NEUROPATHY (H): ICD-10-CM

## 2024-02-26 DIAGNOSIS — G63 POLYNEUROPATHY ASSOCIATED WITH UNDERLYING DISEASE (H): ICD-10-CM

## 2024-02-26 DIAGNOSIS — R52 PAIN: ICD-10-CM

## 2024-02-26 DIAGNOSIS — R35.0 BENIGN PROSTATIC HYPERPLASIA WITH URINARY FREQUENCY: ICD-10-CM

## 2024-02-26 PROCEDURE — 99207 PR NO BILLABLE SERVICE THIS VISIT: CPT | Mod: 93 | Performed by: PHARMACIST

## 2024-02-26 RX ORDER — CHLORTHALIDONE 25 MG/1
TABLET ORAL
COMMUNITY
Start: 2024-02-22

## 2024-02-26 RX ORDER — IBUPROFEN 800 MG/1
800 TABLET, FILM COATED ORAL EVERY 6 HOURS PRN
COMMUNITY

## 2024-02-26 NOTE — PATIENT INSTRUCTIONS
"Recommendations from today's MTM visit:                                                      Continue current dose of topiramate 25 mg- 2 tablets twice daily. Monitor for side effects such as drowsiness, dizziness, and cognitive fogginess. You are aware of the risk of kidney stones.   We discussed that Tylenol is the preferred pain reliever as it will not affect your kidneys; however, ibuprofen can be used sparingly as needed.   We discussed that some of your vitamins may be unnecessary as your multivitamin covers many of them. You could consider stopping the zinc, magnesium, and B vitamins. Vitamin D is okay to continue in the winter months.     Follow-up: yearly or as needed     It was great speaking with you today.  I value your experience and would be very thankful for your time in providing feedback in our clinic survey. In the next few days, you may receive an email or text message from [employer] with a link to a survey related to your  clinical pharmacist.\"     To schedule another MTM appointment, please call the clinic directly or you may call the MTM scheduling line at [phone].    My Clinical Pharmacist's contact information:                                                      Please feel free to contact me with any questions or concerns you have.      Joaquina Alvarez, Pharm.D.  Medication Therapy Management Pharmacist  [facility]     "

## 2024-02-26 NOTE — PROGRESS NOTES
Medication Therapy Management (MTM) Encounter    ASSESSMENT:                            Medication Adherence/Access: No issues identified    Tremor:  Higher dose of topiramate has been helpful and is well tolerated. He is monitoring closely for kidney stones.     Pain:   Discussed that Tylenol would be preferred given his slightly reduced kidney function but occasional ibuprofen is okay to use     H/o Kidney stones:  Recently started chlorthalidone and will have repeat labs in 4 months.     Diabetes   Stable     Hyperlipidemia   Stable     Neuropathy:    Stable; gabapentin dose appropriate for current kidney function     BPH:  Stable     Supplements:  Patient appears to be on some duplicative and unnecessary supplements; could consider stopping the zinc, B vitamins, and magnesium     PLAN:                            Continue current dose of topiramate 25 mg- 2 tablets twice daily. Monitor for side effects such as drowsiness, dizziness, and cognitive fogginess. You are aware of the risk of kidney stones.   We discussed that Tylenol is the preferred pain reliever as it will not affect your kidneys; however, ibuprofen can be used sparingly as needed.   We discussed that some of your vitamins may be unnecessary as your multivitamin covers many of them. You could consider stopping the zinc, magnesium, and B vitamins. Vitamin D is okay to continue in the winter months.     Follow-up: yearly or as needed     SUBJECTIVE/OBJECTIVE:                          David Dacosta is a 68 year old male called for a follow-up visit from 2/11/22.       Reason for visit: yearly medication review.    Allergies/ADRs: Reviewed in chart  Past Medical History: Reviewed in chart  Tobacco: He reports that he has quit smoking. His smoking use included cigars and dip, chew, snus or snuff. His smokeless tobacco use includes chew.  Alcohol: 3-4 cocktails per week    Medication Adherence/Access: no issues reported    Tremor:  Topiramate 50 mg twice daily    Gabapentin 1200 mg nightly (for neuropathy)   Patient states he was previously taking topiramate 75 mg nightly but was noticing tremor was increasing in the evenings so he switched to the 50 mg twice daily dosing after seeing Dr. Delgado. This change in dosing has been helpful and he has not noticed any new side effects during the day. He would like to continue this current medication regimen.     Pain:   Ibuprofen 800 mg as needed   Usually takes this after playing golf due to soreness in his knees, hip, back.he takes the medication sparingly.     H/o Kidney stones:  Potassium citrate 1080 mg 2 tablets twice daily   Chlorthalidone 12.5 mg daily   Patient has a history of kidney stones and is on the potassium citrate for this; however, his urologist recently added chlorthalidone due to hyperkalemia and he will repeat labs in 4 months.     24 labs:  BUN 31  Creatinine 1.13    Stone Analysis: Uric Acid (80%)/Calcium Oxalate monohydrate (20%)   Last Stone Event: Spontaneous stone passage 2022   Last Imaging: Renal US 24 without stones, hydronephrosis     BP Readings from Last 3 Encounters:   24 122/67   22 (!) 162/65   21 122/60     Diabetes   Metformin 500 mg AM/1000 mg PM  Patient is not experiencing side effects.  Blood sugar monitorin on average when fasting   Current diabetes symptoms: none     Eye exam is up to date  Foot exam: not discussed   23 A1c: 6.0%    Hyperlipidemia   atorvastatin 40mg daily  Patient reports no significant myalgias or other side effects.  The 10-year ASCVD risk score (Leonel MUNGUIA, et al., 2019) is: 26.5%    Values used to calculate the score:      Age: 68 years      Sex: Male      Is Non- : No      Diabetic: Yes      Tobacco smoker: No      Systolic Blood Pressure: 122 mmHg      Is BP treated: No      HDL Cholesterol: 50 mg/dL      Total Cholesterol: 154 mg/dL     Recent Labs   Lab Test 20  1238   TRIG 118      Neuropathy:    Gabapentin 1200 mg nightly   Patient reports no concerns at this time.     BPH:  Tamsulosin 0.4 mg daily   Cialis 5 mg daily   Patient reports no concerns at this time.     Supplements   Multivitamin daily   Magnesium glycinate 240 mg daily (2 x 120 mg)  Zn-Zyme (zinc) 50 mg daily (2 x 25 mg)   Progressive Professional Methyl-B12 (B6, folate, B12 blend)  Vitamin D3 4000 units daily in fall-spring but none during summer   No reported issues at this time. He states that several of these were recommended by his chiropractor. He thinks the zinc and magnesium are for diabetes.   12/13/23 vitamin D = 53.4        Today's Vitals: There were no vitals taken for this visit.  ----------------      I spent 41 minutes with this patient today. All changes were made via collaborative practice agreement with Dr. Delgado. A copy of the visit note was provided to the patient's provider(s).    A summary of these recommendations was sent via [employer].    Joaquina Alvarez, Pharm.D.  Medication Therapy Management Pharmacist  [facility] Neurology    Telemedicine Visit Details  Type of service:  Telephone visit  Start Time:  1:00 PM  End Time: 1:41 PM     Medication Therapy Recommendations  Takes dietary supplements    Current Medication: Zinc 25 MG TABS   Rationale: Duplicate Therapy - Unnecessary medication therapy - Indication   Recommendation: Discontinue Medication   Status: Accepted - no CPA Needed

## 2024-02-26 NOTE — Clinical Note
"2024       RE: Damien Dacosta .  [address] [facility] [address]     Dear Colleague,    Thank you for referring your patient, Damien Dacosta Jr., to the [facility]ULT[facility]LE [facility] [place] at [facility]. Please see a copy of my visit note below.    Medication Therapy Management (MTM) Encounter    ASSESSMENT:                            Medication Adherence/Access: {adherencechoices:557615}    ***:   ***    PLAN:                            ***    Follow-up: {followuptest2:060303}    SUBJECTIVE/OBJECTIVE:                          David Dacosta is a 68 year old male called for a follow-up visit from 22. {mtmvisitdetails:351358}      Reason for visit: ***.    Allergies/ADRs: {1/2/3/4/5:821587}  Past Medical History: {1/2/3/4/5:895742}  Tobacco: He reports that he has quit smoking. His smoking use included cigars and dip, chew, snus or snuff. His smokeless tobacco use includes chew.  Alcohol: {ALCOHOL CONSUMPTION HX:263775}  {Social and Goals:045327}  Medication Adherence/Access: {fumedadherence:732489}    Tremor:  Topiramate   Propranolol- not effective?     Diabetes  Metformin 500 mg AM/1000 mg PM  {ASAyesno:376519}  {sideeffects:704258}  Blood sugar monitoring: {MT self monitorin}  Current diabetes symptoms: {Diabetes Symptoms:052600}  Diet/Exercise: ***     Eye exam is up to date  Foot exam: {up to date:790059}  Urine Albumin: No results found for: \"UMALCR\"   23 A1c: 6.0%    GERD   Omeprazole *** mg {FREQUENCY5:686825}   {gerd2:286839}       Hyperlipidemia  atorvastatin 40mg daily  Patient reports {mtmlipidsideeffect:019472}  {lipidascvd:432147}       Recent Labs   Lab Test 20  1238   TRIG 118       Supplements  Vitamin B   Vitamin D3  Zinc   {supplement:442717}         Today's Vitals: There were no vitals taken for this visit.  ----------------  {HERMANN?:181153}    I spent {[facility] total time 3:612452} with this " "patient today{MTMpartdbillingquestion:302822}. { :514811}. A copy of the visit note was provided to the patient's provider(s).    A summary of these recommendations {GIVEN/NOT GIVEN:865454}.    Joaquina Alavrez, Pharm.D.  Medication Therapy Management Pharmacist  [facility]    Telemedicine Visit Details  Type of service:  {telemedvisitmtm:994738::\"Telephone visit\"}  Start Time: {video/phone visit start time:1529}  End Time: {video/phone visit end time:1529}     Medication Therapy Recommendations  No medication therapy recommendations to display     Medication Therapy Management (MTM) Encounter    ASSESSMENT:                            Medication Adherence/Access: {adherencechoices:088155}    ***:   ***    PLAN:                            ***    Follow-up: {followuptest2:321473}    SUBJECTIVE/OBJECTIVE:                          David Dacosta is a 68 year old male called for a follow-up visit from 22.       Reason for visit: yearly medication review.    Allergies/ADRs: Reviewed in chart  Past Medical History: Reviewed in chart  Tobacco: He reports that he has quit smoking. His smoking use included cigars and dip, chew, snus or snuff. His smokeless tobacco use includes chew.  Alcohol: {ALCOHOL CONSUMPTION HX:049209}    Medication Adherence/Access: no issues reported    Tremor:  Topiramate 50 mg twice daily   He was previously taking topiramate 75 mg nightly but was noticing tremor was increasing so he switched to the 50 mg twice daily dosing. He was having more tremor in the later evening.  Since switching the dose he notices a \"little\" difference in his tremor.    Patient has a history of kidney stones and his nephrologist is monitoring this.     Diabetes  Metformin 500 mg AM/1000 mg PM  Patient is not experiencing side effects.  Blood sugar monitoring: {MTM self monitorin}  Current diabetes symptoms: {Diabetes Symptoms:233477}  Diet/Exercise: ***     Eye exam is up to date  Foot exam: {up " "to date:340646}  Urine Albumin: No results found for: \"UMALCR\"   12/13/23 A1c: 6.0%    GERD   Omeprazole *** mg {FREQUENCY5:040549}   {gerd2:354444}       Hyperlipidemia  atorvastatin 40mg daily  Patient reports no significant myalgias or other side effects.  The 10-year ASCVD risk score (Leonel MUNGUIA, et al., 2019) is: 26.5%    Values used to calculate the score:      Age: 68 years      Sex: Male      Is Non- : No      Diabetic: Yes      Tobacco smoker: No      Systolic Blood Pressure: 122 mmHg      Is BP treated: No      HDL Cholesterol: 50 mg/dL      Total Cholesterol: 154 mg/dL     Recent Labs   Lab Test 03/16/20  1238   TRIG 118       Supplements  Vitamin B   Vitamin D3  Zinc   No reported issues at this time.          Today's Vitals: There were no vitals taken for this visit.  ----------------      I spent *** minutes with this patient today{MTMpartdbillingquestion:695496}. { :290081}. A copy of the visit note was provided to the patient's provider(s).    A summary of these recommendations {GIVEN/NOT GIVEN:325152}.    Joaquina Alvarez, Pharm.D.  Medication Therapy Management Pharmacist  [facility]    Telemedicine Visit Details  Type of service:  Telephone visit  Start Time:  1:00 PM  End Time: {video/phone visit end time:014495}     Medication Therapy Recommendations  No medication therapy recommendations to display       Again, thank you for allowing me to participate in the care of your patient.      Sincerely,    Joaquina Alvarez [facility]    "

## 2024-03-23 ENCOUNTER — HEALTH MAINTENANCE LETTER (OUTPATIENT)
Age: 69
End: 2024-03-23

## 2024-06-01 ENCOUNTER — HEALTH MAINTENANCE LETTER (OUTPATIENT)
Age: 69
End: 2024-06-01

## 2024-10-19 ENCOUNTER — HEALTH MAINTENANCE LETTER (OUTPATIENT)
Age: 69
End: 2024-10-19

## 2024-10-28 PROBLEM — Z96.641 HISTORY OF RIGHT HIP REPLACEMENT: Status: ACTIVE | Noted: 2022-10-24

## 2024-11-14 NOTE — PROGRESS NOTES
Diagnosis/Summary/Recommendations:    PATIENT: Damien Dacosta Jr.  69 year old male     : 1955    VALERIANO: Dec 3, 2024     MRN: [id number]  MRN: [id number]  [address] [facility] [address]   [email or web address]  [phone] (M)   [phone] (H)   [phone] (W)   María Dacosta  [id number]     [phone]      [email or web address]     Assessment:  (G25.9) Movement disorder  (primary encounter diagnosis)  (R25.1) Tremor  Tremor - diagnoses of essential tremor.   Propranolol inderal 20mg twice daily  - stopped  Started on propranolol in 2020  Tremor duration 3 years  Right handed  Bilateral hand tremor  Handwriting is affected. shakey  Utensils - difficulties with fork   May need two hands to hold a cup  He has not dropped things but has significant shaking.   He has a bit of head tremor.   Alleviating factors - no clear - not sure if alcohol helps.   Tremor is better in the morning and increased after working out.   Has had problems with putting - short puts.      (G62.9) Neuropathy  Gabapentin neurontin 300mg x4 at night.   For neuropathy  May have some creepy crawling leg feelings   The gabapentin is helpful for his neuropathy   If he forgets to take it he cannot fall asleep.     Review of diagnosis        Review of diagnosis    Tremor  neuropathy     Avoidance of dopamine blockers   Not taking     Motor complication review   n/a     Review of Impulse control disorders   Not      Review of surgical or medication options   reviewed     Gait/Balance/Falls   No falls or balance     Exercise/Therapy performed/offered   Exercising -  Playing golf  Does personal training weekly  Does physical th erapy for his hip - had hip replacement last fall  Elliptical and rowing     Cognitive/Driving   Denies  retired     Mood   Mood is fine     Hallucinations/delusions   denies     Sleep   able to fall asleep relatively quickly within 15-20 minutes and sleeps through the night.   He  gets up to urinate once nightly  Snores a bit  No loss of smell  No talking in his sleep  Goes to bed around 1030p and wakes up 7am  Taking gabapentin helps his sleep      Bladder/Renal/Prostate/Gyn/Other  he has been diagnosed with bph - shaved the prostate  Bladder stone on 11/26/2021  Not sure as to etiology of bladder stone but presumed not linked to increased risk of kidney stone  He had procedure to alleviate the altered opening for his urinary problems.   No renal stones     GI/Constipation/GERD   Omeprazole prilosec 20mg  Senna-docusate senokot-S 8.6-50 -  Not taking  Regular bowels      ENDO/Lipid/DM/Bone density/Thyroid  Metformin glucophage 500mg  In am and 2 in pm. Has 5.8 a1c and 130s   Has a bit of neuropathy. No renal or eye problems  Has some involvement in the bottom of his feet.   a1c 5.9 on 11/18/2021  6.0 on 1/18/2023     Cardio/heart/Hyper or Hypotensive   ECG 11/26/2021  Last stress echocardiogram - 10/21/2014  No issues     Vision/Dry Eyes/Cataracts/Glaucoma/Macular   Has not had cataract      Heme/Anticoagulation/Antiplatelet/Anemia/Other  Aspirin 325mg - not taking  B12 methylcobalamin taking      ENT/Resp  No loss of smell  Had covid in the past  2022 or so      Skin/Cancer/Seborrhea/other  No skin cancer  Precancerous lesions removed     Musculoskeletal/Pain/Headache  Hip surgery 10/2022  Walking fine      Other:  Acetaminophen tylenol 325mg as needed   Oxycodone IR roxicodone 5mg - not taking      Amoxicillin amoxil 500mg prior to dental      Tremors are better with topiramate (topamax)  Has improvement with topiramate 3 x 25mg at night   Aware of stone risks  Wishes to stay on this medication and not increase the dose  Medication was refilled.      Has neuropathy   Taking gabapentin 4 x 300mg at night   Has diabetes with A1c was 6.0  He is getting this from allie RODRIGUEZ from Dr. Lewis's o ffice     Last visit was 12/2021     Return visit  ~ 1 year or so.      He is on propranolol and  his heart rate is 50 and he has not noticed much benefit. He may want to stop the daily use of propranolol due to its limited benefit and use it as needed. I am not sure we can increase the dose of this medication further given his low heart rate. He had been seen by a neurologist in the past: Dr. Gabriella Elena      topiramate/topamax and primidone/mysoline and discussed having Joaquina Melgar's input from pharmacy     Discussed occupational therapy - and made referral     Discussed deep brain stimulation (DBS)     Last visit was 5/2023     He had a bladder stone 2021 - prior to topiramate  Has not had a renal stone since being on topiramate  He sees a urologist - [facility]  Will be getting a potassium level on Monday  Urinary habits are okay   Night time gets up once per night - sometimes he has some hesitancy      He has a low heart rate and did not have much benefit from propranolol and is not on this.   He could try it again - 10mg as needed in the evening but will forego     His tremor is okay in the morning but at the end of the day the tremor is more noticeable and may affect his ability to use utensils.      He had tried topiramate topamax a few days taking in the morning and was not sure the tremor is better     His tremor may be worse later in the day due to fatigue and after exercise     Discussed primidone/mysoline which could be added but has side effects     As his tremor has worsened      IETF international Essential Tremor Foundation   Junito Wray - tremor research   Critical access hospital   [email or web address]     Pharmacy (MTM) consultation and medication management  Please call the scheduling number @ [phone] to set up an appointment with pharmacists Joaquina Alvarez or Karolyn Cameron.      Discussed research - [employer], [employer], [employer]      He may increase to 4/day - could  take it 2 pills twice daily      Going [place] - see brother and sister  Go in a week      Return 1   "year or so         Medications     8am   8pm   Acetaminophen tylenol 325mg prn       Amoxicillin amoxil 500mg  dental        Atorvastatin lipitor 40mg  1       Chlorthalidone hygroton 25mg off       Doxycyline adoxa 100mg off     Gabapentin neurontin 300mg (allie tran)     4   Ibuprofen advil 800mg off       Magnesium glycinate 2       Meloxicam mobic 7.5mg off        Metformin glucophage 500mg 1   2   MVI 1       Nonform B12 100mcg, folate, etc. 1       Omeprazole prilosec 20mg  1       Ozempic  off        Potassium citrate urocit K 10meq CR 1      Propranolol inderal 20mg  stopped       Senna-docusate senokot-S 8.6-50 stopped   1   Tadalafil cialis 5mg prn       Tamsulosin flomax 0.4mg     1    Topiramate topamax 25mg     2   Vit B 1        Vit D3 cholecalciferol 50 mcg (2000u) 2 (winter)       Zinc 25mg 2                       Plan:    Having more tremor issues as the days goes on and his tremor increases.   He has more tremor after exercising.   He notices it when he is putting and his putting is \"terrible\" and is discouraging.   He notices it when using an utensil. He notices it more in the right hand and so is eating with his left hand.   He is taking 2 topiramate and has not increased up higher as concerned about renal stones  He has cut out spinach as may have been linked with renal stones.     Wants to learn about DBS - and discussed    Last seen 1/2024    Return in one year.     Short trips to [place] in FEb and going to [place] short trip.     He is off blood pressure medication  BP - 104/69     6.2 A1c June 24, 2024  Off ozempic  Lost weight  Remains on metformin    [place] getting cholesterol checked with endocrinology     [place] having a pharmacy visit to discuss the B complex and [id number] supplement.   Consider getting blood levels of B12/methylmalonic acid if desired to check levels.     Essential Tremor    Beta blockers, e.g., propranolol (Inderal), metoprolol, nadolol - these medications lower the " heart rate and blood pressure.   As needed using a low dose of 10mg or 20mg of propranolol - benefit may be for a few hours and does not tend to affect blood pressure or heart rate  Daily doses used are often higher and can affect blood pressure and heart rate.   Primidone (Mysoline) - this medication is metabolized by the liver and and can affect metabolism of other medications and affect gait/balance/thinking   Topiramate (Topamax) - can cause renal (kidney) stones, weight loss and can affect thinking abilities  Research on calcium channel blocker medications by [employer] and [employer].     Praxis tremor study  https://www.praxisessentialtremor.Pixtr/  https://ysimipkcs6bmdqs.Pixtr/?utm_source=meta&utm_medium=social&utm_campaign=praxis&utm_content=meta_page  Ulixacaltamide    [employer]   https://www.iCharts/science_stories/evaluating-a-new-potential-treatment-for-essential-tremor/  selective modulator of T-type calcium channels  Suvecaltamide (NSF531)     Invasive Therapies  Gamma knife radiosurgery  Focused ultrasound  Deep Brain Stimulation (DBS)     Coding statement:   Medical Decision Making:  #  Chronic progressive medical conditions addressed  - see above --   Review and/or interpretation of unique test or documentation from a provider outside of neurology - no   Independent historian provided additional details  yes I  Prescription drug management and review of potential side effects and/or monitoring for side effects  -- see above ---  Health impacted by social determinants of health  no    I have reviewed the note as documented above.  This accurately captures the substance of my conversation with the patient and total time spent preparing for visit, executing visit and completing visit on the day of the visit:  30 minutes.  The portion of this total time included face to face time     The longitudinal plan of care for Damien Dacosta Jr. was addressed during this visit. Due to the added complexity  in care, I will continue to support Damien Dacosta Jr. in the subsequent management of this condition(s) and with the ongoing continuity of care of this condition(s).      Jonathan Delgado MD     ______________________________________    Last visit date and details:             ______________________________________      Patient was asked about 14 Review of systems including changes in vision (dry eyes, double vision), hearing, heart, lungs, musculoskeletal, depression, anxiety, snoring, RBD, insomnia, urinary frequency, urinary urgency, constipation, swallowing problems, hematological, ID, allergies, skin problems: seborrhea, endocrinological: thyroid, diabetes, cholesterol; balance, weight changes, and other neurological problems and these were not significant at this time except for   Patient Active Problem List   Diagnosis    Numbness and tingling in left arm    Musculoskeletal disorder involving upper trapezius muscle    Right knee pain    Left knee pain    S/P total knee replacement using cement, right    Acute pain of right knee    Knee effusion, right    S/P total knee replacement using cement, left    Benign prostatic hyperplasia with urinary frequency    Family history of cardiovascular disease    Routine general medical examination at a health care facility    Type 2 diabetes, controlled, with peripheral neuropathy (H)    Hyperlipidemia    Polyneuropathy associated with underlying disease (H)    Increased frequency of urination    Diabetes mellitus (H)    Microalbuminuria due to type 2 diabetes mellitus (H)    History of right hip replacement          Allergies   Allergen Reactions    Shellfish Allergy Anaphylaxis and Other (See Comments)     Past Surgical History:   Procedure Laterality Date    ARTHROPLASTY KNEE Right 09/20/2016    Procedure: ARTHROPLASTY KNEE;  Surgeon: Tommie Gramajo MD;  Location:  OR    ARTHROPLASTY KNEE Left 03/15/2018    Procedure: ARTHROPLASTY KNEE;  LEFT TOTAL KNEE ARTHROPLASTY;   Surgeon: Tommie Gramajo MD;  Location:  OR    COLONOSCOPY      CYSTOSCOPY PROSTATE W/ LASER N/A 11/26/2021    Procedure: CYSTOSCOPY WITH QUANTA LASER TRANSURETHRAL INCISION OF THE PROSTATE;  Surgeon: Arsh Herbert MD;  Location: [facility] OR    CYSTOSCOPY, WITH LITHOLAPAXY USING HOLMIUM LASER N/A 11/26/2021    Procedure: CYSTOLITHOLAPAXY;  Surgeon: Arsh Herbert MD;  Location: [facility] OR    ENT SURGERY      tonsillectomy    GENITOURINARY SURGERY  2012    laser TURP    hip surgery Right 10/10/2022    right hip surgery    ORTHOPEDIC SURGERY Bilateral     knee arthroscopy    other surgery  11/26/2021    bladder stone -     Past Medical History:   Diagnosis Date    Arthritis     osteoarthrosis of lower keg    Diabetes (H)     type 2    Hyperlipidemia      Social History     Socioeconomic History    Marital status:      Spouse name: Not on file    Number of children: Not on file    Years of education: Not on file    Highest education level: Not on file   Occupational History    Not on file   Tobacco Use    Smoking status: Former     Types: Cigars, Dip, chew, snus or snuff    Smokeless tobacco: Current     Types: Chew    Tobacco comments:     quit cigs 20 years ago   Substance and Sexual Activity    Alcohol use: Yes     Comment: 2-4/ week    Drug use: No    Sexual activity: Not on file   Other Topics Concern    Parent/sibling w/ CABG, MI or angioplasty before 65F 55M? 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        Wife is extended furlough -         3 kids - 2 sons and 1 daughter     Social Drivers of Health     Financial Resource Strain: High Risk (1/1/2022)    [facility]    Financial Resource Strain     Difficulty of Paying Living Expenses: Not on file     Difficulty of Paying Living Expenses: Not on file   Food Insecurity: Not on file   Transportation Needs: Not on file   Physical Activity: Not on  file   Stress: Not on file   Social Connections: Unknown (1/1/2022)    Received from [email or web address] [facility]    Social Connections     Frequency of Communication with Friends and Family: Not on file   Interpersonal Safety: Not on file   Housing Stability: Not on file       Drug and lactation database from the [place] National Library of Medicine:  http://toxnet.nlm.nih.gov/cgi-bin/sis/htmlgen?LACT      B/P: Data Unavailable, T: Data Unavailable, P: Data Unavailable, R: Data Unavailable 0 lbs 0 oz  There were no vitals taken for this visit., There is no height or weight on file to calculate BMI.  Medications and Vitals not listed above were documented in the cart and reviewed by me.     Current Outpatient Medications   Medication Sig Dispense Refill    amoxicillin (AMOXIL) 500 MG tablet Take 4 tabs oral 1 hour before dental work      atorvastatin (LIPITOR) 40 MG tablet Take 40 mg by mouth every morning       chlorthalidone (HYGROTON) 25 MG tablet Take 0.5 tablets every day by oral route.      doxycycline monohydrate (ADOXA) 100 MG tablet       gabapentin (NEURONTIN) 300 MG capsule 4 x 300mg capsules (1200mg) by mouth at bedtime      ibuprofen (ADVIL/MOTRIN) 800 MG tablet Take 800 mg by mouth every 6 hours as needed for moderate pain      SMILEY BLOOD GLUCOSE TEST test strip Dispense item covered by pt ins. E11.9 NIDDM type II - Test 1 time/day      MAGNESIUM GLYCINATE PO Take 240 mg by mouth daily      meloxicam (MOBIC) 7.5 MG tablet       metFORMIN (GLUCOPHAGE) 500 MG tablet TAKE 1 TABLET EVERY MORNING AND 2 TABLETS AT BEDTIME      multivitamin (ONE-DAILY) tablet Take 1 tablet by mouth daily      OZEMPIC, 0.25 OR 0.5 MG/DOSE, 2 MG/3ML pen       potassium citrate (UROCIT-K) 10 MEQ (1080 MG) CR tablet Take 20 mEq by mouth 2 times daily      tadalafil (CIALIS) 5 MG tablet Take 5 mg by mouth daily      tamsulosin (FLOMAX) 0.4 MG capsule Take 1 capsule by mouth every evening      topiramate  (TOPAMAX) 25 MG tablet 4/day 360 tablet 11    vitamin B complex with vitamin C (VITAMIN  B COMPLEX) tablet Take 1 tablet by mouth daily      vitamin D3 (CHOLECALCIFEROL) 50 mcg (2000 units) tablet 2 tabs daily (winter only)      Zinc 25 MG TABS 2 x 25mg tabs by mouth daily           Jonathan Delgado MD

## 2024-12-03 ENCOUNTER — OFFICE VISIT (OUTPATIENT)
Dept: NEUROLOGY | Facility: CLINIC | Age: 69
End: 2024-12-03
Payer: COMMERCIAL

## 2024-12-03 VITALS
OXYGEN SATURATION: 99 % | HEART RATE: 82 BPM | RESPIRATION RATE: 16 BRPM | DIASTOLIC BLOOD PRESSURE: 69 MMHG | SYSTOLIC BLOOD PRESSURE: 104 MMHG

## 2024-12-03 DIAGNOSIS — R25.1 TREMOR: Primary | ICD-10-CM

## 2024-12-03 PROCEDURE — 99214 OFFICE O/P EST MOD 30 MIN: CPT | Performed by: PSYCHIATRY & NEUROLOGY

## 2024-12-03 RX ORDER — TOPIRAMATE 25 MG/1
TABLET, FILM COATED ORAL
Qty: 360 TABLET | Refills: 11 | Status: SHIPPED | OUTPATIENT
Start: 2024-12-03

## 2024-12-03 NOTE — LETTER
12/3/2024       RE: aDmien Dacosta Jr.  [address] [place] [address]     Dear Colleague,    Thank you for referring your patient, Damien Dacosta Jr., to the [facility] [place] at [facility]. Please see a copy of my visit note below.      Diagnosis/Summary/Recommendations:    PATIENT: Dmaien Dacosta Jr.  69 year old male     : 1955    VALERIANO: Dec 3, 2024     MRN: [id number]  MRN: [id number]  [address] [facility]   [place] [address]   [email or web address]  [phone] (M)   [phone] (H)   [phone] (W)   María Dacosta  [id number]     [phone]      [email or web address]     Assessment:  (G25.9) Movement disorder  (primary encounter diagnosis)  (R25.1) Tremor  Tremor - diagnoses of essential tremor.   Propranolol inderal 20mg twice daily  - stopped  Started on propranolol in 2020  Tremor duration 3 years  Right handed  Bilateral hand tremor  Handwriting is affected. shakey  Utensils - difficulties with fork   May need two hands to hold a cup  He has not dropped things but has significant shaking.   He has a bit of head tremor.   Alleviating factors - no clear - not sure if alcohol helps.   Tremor is better in the morning and increased after working out.   Has had problems with putting - short puts.      (G62.9) Neuropathy  Gabapentin neurontin 300mg x4 at night.   For neuropathy  May have some creepy crawling leg feelings   The gabapentin is helpful for his neuropathy   If he forgets to take it he cannot fall asleep.     Review of diagnosis        Review of diagnosis    Tremor  neuropathy     Avoidance of dopamine blockers   Not taking     Motor complication review   n/a     Review of Impulse control disorders   Not      Review of surgical or medication options   reviewed     Gait/Balance/Falls   No falls or balance     Exercise/Therapy performed/offered   Exercising -  Playing  golf  Does personal training weekly  Does physical th erapy for his hip - had hip replacement last fall  Elliptical and rowing     Cognitive/Driving   Denies  retired     Mood   Mood is fine     Hallucinations/delusions   denies     Sleep   able to fall asleep relatively quickly within 15-20 minutes and sleeps through the night.   He gets up to urinate once nightly  Snores a bit  No loss of smell  No talking in his sleep  Goes to bed around 1030p and wakes up 7am  Taking gabapentin helps his sleep      Bladder/Renal/Prostate/Gyn/Other  he has been diagnosed with bph - shaved the prostate  Bladder stone on 11/26/2021  Not sure as to etiology of bladder stone but presumed not linked to increased risk of kidney stone  He had procedure to alleviate the altered opening for his urinary problems.   No renal stones     GI/Constipation/GERD   Omeprazole prilosec 20mg  Senna-docusate senokot-S 8.6-50 -  Not taking  Regular bowels      ENDO/Lipid/DM/Bone density/Thyroid  Metformin glucophage 500mg  In am and 2 in pm. Has 5.8 a1c and 130s   Has a bit of neuropathy. No renal or eye problems  Has some involvement in the bottom of his feet.   a1c 5.9 on 11/18/2021  6.0 on 1/18/2023     Cardio/heart/Hyper or Hypotensive   ECG 11/26/2021  Last stress echocardiogram - 10/21/2014  No issues     Vision/Dry Eyes/Cataracts/Glaucoma/Macular   Has not had cataract      Heme/Anticoagulation/Antiplatelet/Anemia/Other  Aspirin 325mg - not taking  B12 methylcobalamin taking      ENT/Resp  No loss of smell  Had covid in the past  2022 or so      Skin/Cancer/Seborrhea/other  No skin cancer  Precancerous lesions removed     Musculoskeletal/Pain/Headache  Hip surgery 10/2022  Walking fine      Other:  Acetaminophen tylenol 325mg as needed   Oxycodone IR roxicodone 5mg - not taking      Amoxicillin amoxil 500mg prior to dental      Tremors are better with topiramate (topamax)  Has improvement with topiramate 3 x 25mg at night   Aware of stone  risks  Wishes to stay on this medication and not increase the dose  Medication was refilled.      Has neuropathy   Taking gabapentin 4 x 300mg at night   Has diabetes with A1c was 6.0  He is getting this from allie RODRIGUEZ from Dr. Lewis's o ffice     Last visit was 12/2021     Return visit  ~ 1 year or so.      He is on propranolol and his heart rate is 50 and he has not noticed much benefit. He may want to stop the daily use of propranolol due to its limited benefit and use it as needed. I am not sure we can increase the dose of this medication further given his low heart rate. He had been seen by a neurologist in the past: Dr. Gabriella Elena      topiramate/topamax and primidone/mysoline and discussed having Joaquina Melgar's input from pharmacy     Discussed occupational therapy - and made referral     Discussed deep brain stimulation (DBS)     Last visit was 5/2023     He had a bladder stone 2021 - prior to topiramate  Has not had a renal stone since being on topiramate  He sees a urologist - [facility]  Will be getting a potassium level on Monday  Urinary habits are okay   Night time gets up once per night - sometimes he has some hesitancy      He has a low heart rate and did not have much benefit from propranolol and is not on this.   He could try it again - 10mg as needed in the evening but will forego     His tremor is okay in the morning but at the end of the day the tremor is more noticeable and may affect his ability to use utensils.      He had tried topiramate topamax a few days taking in the morning and was not sure the tremor is better     His tremor may be worse later in the day due to fatigue and after exercise     Discussed primidone/mysoline which could be added but has side effects     As his tremor has worsened      IETF international Essential Tremor Foundation   Junito Wray - tremor research   [facility]   [email or web address]     Pharmacy ([facility]) consultation and medication  "management  Please call the scheduling number @ [phone] to set up an appointment with pharmacists Joaquina Alvarez or Karolyn Cameron.      Discussed research - [employer], [employer], [employer]      He may increase to 4/day - could  take it 2 pills twice daily      Going [place] - see brother and sister  Go in a week      Return 1  year or so         Medications     8am   8pm   Acetaminophen tylenol 325mg prn       Amoxicillin amoxil 500mg  dental        Atorvastatin lipitor 40mg  1       Chlorthalidone hygroton 25mg off       Doxycyline adoxa 100mg off     Gabapentin neurontin 300mg (alliebernard angelae)     4   Ibuprofen advil 800mg off       Magnesium glycinate 2       Meloxicam mobic 7.5mg off        Metformin glucophage 500mg 1   2   MVI 1       Nonform B12 100mcg, folate, etc. 1       Omeprazole prilosec 20mg  1       Ozempic  off        Potassium citrate urocit K 10meq CR 1      Propranolol inderal 20mg  stopped       Senna-docusate senokot-S 8.6-50 stopped   1   Tadalafil cialis 5mg prn       Tamsulosin flomax 0.4mg     1    Topiramate topamax 25mg     2   Vit B 1        Vit D3 cholecalciferol 50 mcg (2000u) 2 (winter)       Zinc 25mg 2                       Plan:    Having more tremor issues as the days goes on and his tremor increases.   He has more tremor after exercising.   He notices it when he is putting and his putting is \"terrible\" and is discouraging.   He notices it when using an utensil. He notices it more in the right hand and so is eating with his left hand.   He is taking 2 topiramate and has not increased up higher as concerned about renal stones  He has cut out spinach as may have been linked with renal stones.     Wants to learn about DBS - and discussed    Last seen 1/2024    Return in one year.     Short trips to [place] in FEb and going to [place] short trip.     He is off blood pressure medication  BP - 104/69     6.2 A1c June 24, 2024  Off ozempic  Lost weight  Remains on " metformin    [place] getting cholesterol checked with endocrinology     [place] having a pharmacy visit to discuss the B complex and [id number] supplement.   Consider getting blood levels of B12/methylmalonic acid if desired to check levels.     Essential Tremor    Beta blockers, e.g., propranolol (Inderal), metoprolol, nadolol - these medications lower the heart rate and blood pressure.   As needed using a low dose of 10mg or 20mg of propranolol - benefit may be for a few hours and does not tend to affect blood pressure or heart rate  Daily doses used are often higher and can affect blood pressure and heart rate.   Primidone (Mysoline) - this medication is metabolized by the liver and and can affect metabolism of other medications and affect gait/balance/thinking   Topiramate (Topamax) - can cause renal (kidney) stones, weight loss and can affect thinking abilities  Research on calcium channel blocker medications by [employer] and [employer].     Praxis tremor study  https://[email or web address].Appside/  https://breydrhjk7rueyg.Appside/?utm_source=meta&utm_medium=social&utm_campaign=praxis&utm_content=meta_page  Ulixacaltamide    [employer]   https://www.Influx/science_stories/evaluating-a-new-potential-treatment-for-essential-tremor/  selective modulator of T-type calcium channels  Suvecaltamide (KUC393)     Invasive Therapies  Gamma knife radiosurgery  Focused ultrasound  Deep Brain Stimulation (DBS)     Coding statement:   Medical Decision Making:  #  Chronic progressive medical conditions addressed  - see above --   Review and/or interpretation of unique test or documentation from a provider outside of neurology - no   Independent historian provided additional details  yes I  Prescription drug management and review of potential side effects and/or monitoring for side effects  -- see above ---  Health impacted by social determinants of health  no    I have reviewed the note as documented above.  This accurately  captures the substance of my conversation with the patient and total time spent preparing for visit, executing visit and completing visit on the day of the visit:  30 minutes.  The portion of this total time included face to face time     The longitudinal plan of care for Damien Dacosta Jr. was addressed during this visit. Due to the added complexity in care, I will continue to support Damien Dacosta Jr. in the subsequent management of this condition(s) and with the ongoing continuity of care of this condition(s).      Jonathan Delgado MD     ______________________________________    Last visit date and details:             ______________________________________      Patient was asked about 14 Review of systems including changes in vision (dry eyes, double vision), hearing, heart, lungs, musculoskeletal, depression, anxiety, snoring, RBD, insomnia, urinary frequency, urinary urgency, constipation, swallowing problems, hematological, ID, allergies, skin problems: seborrhea, endocrinological: thyroid, diabetes, cholesterol; balance, weight changes, and other neurological problems and these were not significant at this time except for   Patient Active Problem List   Diagnosis     Numbness and tingling in left arm     Musculoskeletal disorder involving upper trapezius muscle     Right knee pain     Left knee pain     S/P total knee replacement using cement, right     Acute pain of right knee     Knee effusion, right     S/P total knee replacement using cement, left     Benign prostatic hyperplasia with urinary frequency     Family history of cardiovascular disease     Routine general medical examination at a health care facility     Type 2 diabetes, controlled, with peripheral neuropathy (H)     Hyperlipidemia     Polyneuropathy associated with underlying disease (H)     Increased frequency of urination     Diabetes mellitus (H)     Microalbuminuria due to type 2 diabetes mellitus (H)     History of right hip replacement           Allergies   Allergen Reactions     Shellfish Allergy Anaphylaxis and Other (See Comments)     Past Surgical History:   Procedure Laterality Date     ARTHROPLASTY KNEE Right 09/20/2016    Procedure: ARTHROPLASTY KNEE;  Surgeon: Tommie Gramajo MD;  Location:  OR     ARTHROPLASTY KNEE Left 03/15/2018    Procedure: ARTHROPLASTY KNEE;  LEFT TOTAL KNEE ARTHROPLASTY;  Surgeon: Tommie Gramajo MD;  Location:  OR     COLONOSCOPY       CYSTOSCOPY PROSTATE W/ LASER N/A 11/26/2021    Procedure: CYSTOSCOPY WITH QUANTA LASER TRANSURETHRAL INCISION OF THE PROSTATE;  Surgeon: Arsh Herbert MD;  Location: [facility] OR     CYSTOSCOPY, WITH LITHOLAPAXY USING HOLMIUM LASER N/A 11/26/2021    Procedure: CYSTOLITHOLAPAXY;  Surgeon: Arsh Herbert MD;  Location: [facility] OR     ENT SURGERY      tonsillectomy     GENITOURINARY SURGERY  2012    laser TURP     hip surgery Right 10/10/2022    right hip surgery     ORTHOPEDIC SURGERY Bilateral     knee arthroscopy     other surgery  11/26/2021    bladder stone -     Past Medical History:   Diagnosis Date     Arthritis     osteoarthrosis of lower keg     Diabetes (H)     type 2     Hyperlipidemia      Social History     Socioeconomic History     Marital status:      Spouse name: Not on file     Number of children: Not on file     Years of education: Not on file     Highest education level: Not on file   Occupational History     Not on file   Tobacco Use     Smoking status: Former     Types: Cigars, Dip, chew, snus or snuff     Smokeless tobacco: Current     Types: Chew     Tobacco comments:     quit cigs 20 years ago   Substance and Sexual Activity     Alcohol use: Yes     Comment: 2-4/ week     Drug use: No     Sexual activity: Not on file   Other Topics Concern     Parent/sibling w/ CABG, MI or angioplasty before 65F 55M? 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        Wife is extended furlough -         3 kids  - 2 sons and 1 daughter     Social Drivers of Health     Financial Resource Strain: High Risk (1/1/2022)    Received from [facility]    Financial Resource Strain      Difficulty of Paying Living Expenses: Not on file      Difficulty of Paying Living Expenses: Not on file   Food Insecurity: Not on file   Transportation Needs: Not on file   Physical Activity: Not on file   Stress: Not on file   Social Connections: Unknown (1/1/2022)    Received from [facility]    Social Connections      Frequency of Communication with Friends and Family: Not on file   Interpersonal Safety: Not on file   Housing Stability: Not on file       Drug and lactation database from the [place] National Library of Medicine:  http://toxnet.nlm.nih.gov/cgi-bin/sis/htmlgen?LACT      B/P: Data Unavailable, T: Data Unavailable, P: Data Unavailable, R: Data Unavailable 0 lbs 0 oz  There were no vitals taken for this visit., There is no height or weight on file to calculate BMI.  Medications and Vitals not listed above were documented in the cart and reviewed by me.     Current Outpatient Medications   Medication Sig Dispense Refill     amoxicillin (AMOXIL) 500 MG tablet Take 4 tabs oral 1 hour before dental work       atorvastatin (LIPITOR) 40 MG tablet Take 40 mg by mouth every morning        chlorthalidone (HYGROTON) 25 MG tablet Take 0.5 tablets every day by oral route.       doxycycline monohydrate (ADOXA) 100 MG tablet        gabapentin (NEURONTIN) 300 MG capsule 4 x 300mg capsules (1200mg) by mouth at bedtime       ibuprofen (ADVIL/MOTRIN) 800 MG tablet Take 800 mg by mouth every 6 hours as needed for moderate pain       SMILEY BLOOD GLUCOSE TEST test strip Dispense item covered by pt ins. E11.9 NIDDM type II - Test 1 time/day       MAGNESIUM GLYCINATE PO Take 240 mg by mouth daily       meloxicam (MOBIC) 7.5 MG tablet        metFORMIN (GLUCOPHAGE) 500 MG tablet TAKE 1  TABLET EVERY MORNING AND 2 TABLETS AT BEDTIME       multivitamin (ONE-DAILY) tablet Take 1 tablet by mouth daily       OZEMPIC, 0.25 OR 0.5 MG/DOSE, 2 MG/3ML pen        potassium citrate (UROCIT-K) 10 MEQ (1080 MG) CR tablet Take 20 mEq by mouth 2 times daily       tadalafil (CIALIS) 5 MG tablet Take 5 mg by mouth daily       tamsulosin (FLOMAX) 0.4 MG capsule Take 1 capsule by mouth every evening       topiramate (TOPAMAX) 25 MG tablet 4/day 360 tablet 11     vitamin B complex with vitamin C (VITAMIN  B COMPLEX) tablet Take 1 tablet by mouth daily       vitamin D3 (CHOLECALCIFEROL) 50 mcg (2000 units) tablet 2 tabs daily (winter only)       Zinc 25 MG TABS 2 x 25mg tabs by mouth daily           Jonathan Delgado MD      Again, thank you for allowing me to participate in the care of your patient.      Sincerely,    Jonathan Delgado MD

## 2024-12-03 NOTE — PATIENT INSTRUCTIONS
"  Medications     8am   8pm   Acetaminophen tylenol 325mg prn       Amoxicillin amoxil 500mg  dental        Atorvastatin lipitor 40mg  1       Chlorthalidone hygroton 25mg off       Doxycyline adoxa 100mg off     Gabapentin neurontin 300mg (allie rtan)     4   Ibuprofen advil 800mg off       Magnesium glycinate 2       Meloxicam mobic 7.5mg off        Metformin glucophage 500mg 1   2   MVI 1       Nonform B12 100mcg, folate, etc. 1       Omeprazole prilosec 20mg  1       Ozempic  off        Potassium citrate urocit K 10meq CR 1      Propranolol inderal 20mg  stopped       Senna-docusate senokot-S 8.6-50 stopped   1   Tadalafil cialis 5mg prn       Tamsulosin flomax 0.4mg     1    Topiramate topamax 25mg     2   Vit B 1        Vit D3 cholecalciferol 50 mcg (2000u) 2 (winter)       Zinc 25mg 2                       Plan:    Having more tremor issues as the days goes on and his tremor increases.   He has more tremor after exercising.   He notices it when he is putting and his putting is \"terrible\" and is discouraging.   He notices it when using an utensil. He notices it more in the right hand and so is eating with his left hand.   He is taking 2 topiramate and has not increased up higher as concerned about renal stones  He has cut out spinach as may have been linked with renal stones.     Wants to learn about DBS - and discussed    Last seen 1/2024    Return in one year.     Short trips to [place] in FEb and going to [place] short trip.     He is off blood pressure medication  BP - 104/69     6.2 A1c June 24, 2024  Off ozempic  Lost weight  Remains on metformin    [place] getting cholesterol checked with endocrinology     [place] having a pharmacy visit to discuss the B complex and [id number] supplement.   Consider getting blood levels of B12/methylmalonic acid if desired to check levels.     Essential Tremor    Beta blockers, e.g., propranolol (Inderal), metoprolol, nadolol - these medications lower the heart rate and " blood pressure.   As needed using a low dose of 10mg or 20mg of propranolol - benefit may be for a few hours and does not tend to affect blood pressure or heart rate  Daily doses used are often higher and can affect blood pressure and heart rate.   Primidone (Mysoline) - this medication is metabolized by the liver and and can affect metabolism of other medications and affect gait/balance/thinking   Topiramate (Topamax) - can cause renal (kidney) stones, weight loss and can affect thinking abilities  Research on calcium channel blocker medications by [employer] and [employer].     Praxis tremor study  https://www.praxisessentialtremor.Orthera/  https://bjymfbvyy5oltxh.com/?utm_source=meta&utm_medium=social&utm_campaign=praxis&utm_content=meta_page  Ulixacaltamide    Mandie   https://www.[employer]biquity.Orthera/science_stories/evaluating-a-new-potential-treatment-for-essential-tremor/  selective modulator of T-type calcium channels  Suvecaltamide (YPJ524)     Invasive Therapies  Gamma knife radiosurgery  Focused ultrasound  Deep Brain Stimulation (DBS)

## 2025-01-26 ENCOUNTER — HEALTH MAINTENANCE LETTER (OUTPATIENT)
Age: 70
End: 2025-01-26

## 2025-02-11 ENCOUNTER — VIRTUAL VISIT (OUTPATIENT)
Dept: PHARMACY | Facility: CLINIC | Age: 70
End: 2025-02-11
Attending: PSYCHIATRY & NEUROLOGY
Payer: COMMERCIAL

## 2025-02-11 DIAGNOSIS — G25.0 ESSENTIAL TREMOR: Primary | ICD-10-CM

## 2025-02-11 DIAGNOSIS — E11.42 TYPE 2 DIABETES, CONTROLLED, WITH PERIPHERAL NEUROPATHY (H): ICD-10-CM

## 2025-02-11 DIAGNOSIS — Z78.9 TAKES DIETARY SUPPLEMENTS: ICD-10-CM

## 2025-02-11 RX ORDER — PRIMIDONE 50 MG/1
TABLET ORAL
Qty: 120 TABLET | Refills: 11 | Status: SHIPPED | OUTPATIENT
Start: 2025-02-11

## 2025-02-11 NOTE — PROGRESS NOTES
Medication Therapy Management (MTM) Encounter    ASSESSMENT:                            Medication Adherence/Access: No issues identified.    Tremor/neuropathy:  Patient may benefit from increasing the dose of primidone as he is tolerating the medication well so far. He would like to try a dose in the morning but if this is intolerable he may take the full dose at bedtime.     Diabetes   We discussed the various GLP1 medications available on the market and many of them have similar GI side effects. Tirzepatide may actually be more potent than semaglutide so it could carry a higher risk of side effects. Trulicity is a lower potency GLP1 medication that he may tolerate better, or he could consider retrying Ozempic at a lower dose.      Vitamins/Supplements:   Stable     PLAN:                            Let's increase the primidone by adding a dose in the morning, starting with 1 tablet (25 mg) in the morning and continue 2 tablets (50 mg) at night. If you don't have any side effects with this increase, you can go up to 2 tablets twice daily. Please monitor for drowsiness and dizziness during the day and if you experience these side effects, we can have you take the full dose at bedtime.   We also discussed the GLP1 diabetes/weight loss medications. Most of these medications can cause GI side effects such as heartburn but the side effects are usually dose-dependent. You could consider retrying Ozempic at a lower dose or a less potent medication would be Trulicity. Tirzepatide (Mounjaro) may actually be more potent than Ozempic.  You may continue taking the Multivitamin and Vitamin D but we discussed that you probably don't need the magnesium or B vitamins. You could have your primary care provider check your B vitamins to ensure you aren't deficient after stopping these.     Follow-up: 3/19/25 at 9:30 am by phone    SUBJECTIVE/OBJECTIVE:                          David Dacosta is a 69 year old male seen for a follow-up  visit.       Reason for visit: follow up on primidone.    Allergies/ADRs: Reviewed in chart  Past Medical History: Reviewed in chart  Tobacco: He reports that he quit smoking about 25 years ago. His smoking use included cigarettes. He started smoking about 50 years ago. He has a 25 pack-year smoking history. He quit smokeless tobacco use about 2 years ago.  His smokeless tobacco use included chew.  Alcohol: 1-3 beverages / week    Medication Adherence/Access: no issues reported.    Tremor/neuropathy:  - primidone 100 mg nightly   - Gabapentin 1200 mg nightly (for neuropathy)   Patient states that primidone is not any more effective than topiramate so far. His shakiness is most bothersome around late afternoon/dinnertime. Tremor is pretty good when he wakes up in the morning. He hasn't noticed any side effects of primidone and would be interested in increasing the dosage.     Diabetes   - Metformin 500 mg AM/1000 mg PM  Patient states he was on Ozempic for 4 months and had lost 25 pounds. However, he reported experiencing heartburn and his wife reported it affected his personality. His endocrinologist suggested Tirzepatide as an alternative and he's wondering if this may be more tolerable for him.      Vitamins/Supplements:   - Pure Encapsulations ONE multivitamin daily   - 2 magnesium glycinate 120 mg daily   - 2 vitamin D3 2000 units daily (winter only)  Since our last visit, patient decided to discontinue the zinc and B vitamins. Patient is considering stopping the magnesium as there is some magnesium in the multivitamin.     Today's Vitals: There were no vitals taken for this visit.  ----------------      I spent 19 minutes with this patient today. All changes were made via collaborative practice agreement with Dr. Delgado.     A summary of these recommendations was sent via [employer].    Joaquina Alvarez, Pharm.D.  Medication Therapy Management Pharmacist  [facility]    Telemedicine Visit  Details  The patient's medications can be safely assessed via a telemedicine encounter.  Type of service:  Telephone visit  Originating Location (pt. Location): Home    Distant Location (provider location):  Off-site  Start Time:  9:02 AM  End Time: 9:21 AM     Medication Therapy Recommendations  Essential tremor   1 Current Medication: primidone (MYSOLINE) 50 MG tablet   Current Medication Sig: Take 1 tablet (50 mg) by mouth in the morning and 2 tablets (100 mg) by mouth in the evening. May increase to 2 tablets twice daily if needed = 4 tablets/day max   Rationale: Dose too low - Dosage too low - Effectiveness   Recommendation: Increase Dose   Status: Accepted per CPA   Identified Date: 2/11/2025 Completed Date: 2/11/2025

## 2025-02-11 NOTE — PATIENT INSTRUCTIONS
"Recommendations from today's MTM visit:                                                      Let's increase the primidone by adding a dose in the morning, starting with 1 tablet (25 mg) in the morning and continue 2 tablets (50 mg) at night. If you don't have any side effects with this increase, you can go up to 2 tablets twice daily. Please monitor for drowsiness and dizziness during the day and if you experience these side effects, we can have you take the full dose at bedtime.   We also discussed the GLP1 diabetes/weight loss medications. Most of these medications can cause GI side effects such as heartburn but the side effects are usually dose-dependent. You could consider retrying Ozempic at a lower dose or a less potent medication would be Trulicity. Tirzepatide (Mounjaro) may actually be more potent than Ozempic.  You may continue taking the Multivitamin and Vitamin D but we discussed that you probably don't need the magnesium or B vitamins. You could have your primary care provider check your B vitamins to ensure you aren't deficient after stopping these.     Follow-up: 3/19/25 at 9:30 am by phone    It was great speaking with you today.  I value your experience and would be very thankful for your time in providing feedback in our clinic survey. In the next few days, you may receive an email or text message from [employer] with a link to a survey related to your  clinical pharmacist.\"     To schedule another MTM appointment, please call the clinic directly or you may call the MTM scheduling line at [phone].    My Clinical Pharmacist's contact information:                                                      Please feel free to contact me with any questions or concerns you have.      Joaquina Alvarez, Pharm.D.  Medication Therapy Management Pharmacist  [facility]     "

## 2025-03-22 ENCOUNTER — HEALTH MAINTENANCE LETTER (OUTPATIENT)
Age: 70
End: 2025-03-22

## 2025-03-27 ENCOUNTER — VIRTUAL VISIT (OUTPATIENT)
Dept: PHARMACY | Facility: CLINIC | Age: 70
End: 2025-03-27
Attending: PSYCHIATRY & NEUROLOGY
Payer: COMMERCIAL

## 2025-03-27 DIAGNOSIS — G25.0 ESSENTIAL TREMOR: Primary | ICD-10-CM

## 2025-03-27 DIAGNOSIS — Z87.442 H/O RENAL CALCULI: ICD-10-CM

## 2025-03-27 DIAGNOSIS — Z78.9 TAKES DIETARY SUPPLEMENTS: ICD-10-CM

## 2025-03-27 RX ORDER — SODIUM BICARBONATE 650 MG/1
TABLET ORAL
COMMUNITY
Start: 2025-03-03

## 2025-03-27 NOTE — PROGRESS NOTES
Medication Therapy Management (MTM) Encounter    ASSESSMENT:                            Medication Adherence/Access: No issues identified.    Tremor:  Patient may benefit from switching primidone timing to morning and afternoon to optimize benefit during waking hour s as may consider increasing to 100 mg twice daily since he has tolerated it well     Kidney stones:  Stable     Vitamins/Supplements:   Stable       PLAN:                            Move the primidone to morning and afternoon to try to optimize the benefit during waking hour  Okay to increase the primidone to 2 tablets twice daily if you'd like to try this;     Follow-up: 5/15/25 at 9 am by phone    SUBJECTIVE/OBJECTIVE:                          David Dacosta is a 69 year old male seen for a follow-up visit.       Reason for visit: follow up on medications.    Allergies/ADRs: Reviewed in chart  Past Medical History: Reviewed in chart  Tobacco: He reports that he quit smoking about 25 years ago. His smoking use included cigarettes. He started smoking about 50 years ago. He has a 25 pack-year smoking history. He quit smokeless tobacco use about 2 years ago.  His smokeless tobacco use included chew.  Alcohol: 1-3 beverages / week    Medication Adherence/Access: no issues reported.    Tremor:  - primidone 50 mg every morning and 100 mg nightly  - Gabapentin 1200 mg nightly (for neuropathy)   Patient states primidone has been helpful for tremor, similarly effective to topiramate but he is glad it doesn't carry the risk of kidney stones. Patient states his tremor seems to get worse in the late afternoon/dinnertime but is better controlled other times of the day. He has been able to start playing golf this season. He hasn't had any side effects such as drowsiness or dizziness.     Kidney stones:  - sodium bicarbonate 650 mg 2 tablets twice daily   Potassium citrate was switched to sodium bicarb per nephrology. Patient states he will have another urine test done in  about 3 months.     Vitamins/Supplements:   - Pure Encapsulations ONE multivitamin daily   - methyl B12 1000 mcg vitamin daily   - 2 vitamin D3 2000 units daily (winter only)  Since our last visit, patient decided to continue the [id number].     Today's Vitals: There were no vitals taken for this visit.  ----------------    I spent 15 minutes with this patient today. All changes were made via collaborative practice agreement with Dr. Delgado.     A summary of these recommendations was sent via [employer].    Jaoquina Alvarez, Pharm.D.  Medication Therapy Management Pharmacist  [facility]    Telemedicine Visit Details  The patient's medications can be safely assessed via a telemedicine encounter.  Type of service:  Telephone visit  Originating Location (pt. Location): Home    Distant Location (provider location):  On-site  Start Time:  9:00 AM  End Time: 9:15 AM     Medication Therapy Recommendations  Essential tremor   1 Current Medication: primidone (MYSOLINE) 50 MG tablet   Current Medication Sig: Take 1 tablet (50 mg) by mouth in the morning and 2 tablets (100 mg) by mouth in the evening. May increase to 2 tablets twice daily if needed = 4 tablets/day max   Rationale: Does not understand instructions - Adherence - Adherence   Recommendation: Change Administration Time   Status: Accepted - no CPA Needed   Identified Date: 3/27/2025 Completed Date: 3/30/2025

## 2025-03-30 NOTE — PATIENT INSTRUCTIONS
"Recommendations from today's MTM visit:                                                      Move the primidone to morning and afternoon to try to optimize the benefit during waking hour  Okay to increase the primidone to 2 tablets twice daily if you'd like to try this;     Follow-up: 5/15/25 at 9 am by phone    It was great speaking with you today.  I value your experience and would be very thankful for your time in providing feedback in our clinic survey. In the next few days, you may receive an email or text message from [employer] with a link to a survey related to your  clinical pharmacist.\"     To schedule another MTM appointment, please call the clinic directly or you may call the MTM scheduling line at [phone].    My Clinical Pharmacist's contact information:                                                      Please feel free to contact me with any questions or concerns you have.      Joaquina Alvarez, Pharm.D.  Medication Therapy Management Pharmacist  [facility]     "

## 2025-05-03 ENCOUNTER — HEALTH MAINTENANCE LETTER (OUTPATIENT)
Age: 70
End: 2025-05-03

## 2025-05-15 ENCOUNTER — VIRTUAL VISIT (OUTPATIENT)
Dept: PHARMACY | Facility: CLINIC | Age: 70
End: 2025-05-15
Attending: PSYCHIATRY & NEUROLOGY
Payer: COMMERCIAL

## 2025-05-15 DIAGNOSIS — E11.42 TYPE 2 DIABETES, CONTROLLED, WITH PERIPHERAL NEUROPATHY (H): ICD-10-CM

## 2025-05-15 DIAGNOSIS — G25.0 ESSENTIAL TREMOR: Primary | ICD-10-CM

## 2025-05-15 NOTE — PROGRESS NOTES
"Medication Therapy Management (MTM) Encounter    ASSESSMENT:                            Medication Adherence/Access: No issues identified.    Tremor  Appears stable     Diabetes   Stable; A1c is at goal. We discussed the benefits and risks of GLP1 agonists. It seems that he did not tolerate this medication well in the past so I would not recommend that he retry it at this time.     PLAN:                            Continue current dosing of primidone for your tremor   Let us know if you decide to try a wearable device for tremor or if you would like more information about deep brain stimulation   We discussed the risks and benefits of the GLP1 agonists and feel it is best for you to not be on one of these medications given your previous difficulty with tolerating the medication     Follow-up: 8/14/25 at 9 am by phone     SUBJECTIVE/OBJECTIVE:                          David Dacosta is a 69 year old male seen for a follow-up visit.       Reason for visit: follow up on medications.    Allergies/ADRs: Reviewed in chart  Past Medical History: Reviewed in chart  Tobacco: He reports that he quit smoking about 25 years ago. His smoking use included cigarettes. He started smoking about 50 years ago. He has a 25 pack-year smoking history. He quit smokeless tobacco use about 2 years ago.  His smokeless tobacco use included chew.  Alcohol: 1-3 beverages / week    Medication Adherence/Access: no issues reported.    Tremor  - primidone 100 mg twice daily   - Gabapentin 1200 mg nightly (for neuropathy)     Patient states the primidone is working \"okay.\" He notices some benefit from the medication but tremor is still there. After he exercises the tremors become more pronounced. Putting during golf is becoming more difficult.  He is considering deep brain stimulation and has watched the videos from our clinic but he is not yet ready to pursue surgery.     His friend who is a physician sent him some info about a new wearable product " called Jeremy. It is not FDA approved and he is not planning to pursue it at this time.     He went to chiropractor yesterday and he was given a bottle of Allithiamine 50 mg and told to take 1 /day in the morning and may increase to 2/day. Patient is wondering if this could be helpful for tremor.     Diabetes   - Metformin 500 mg AM/1000 mg PM    Patient states he was on Ozempic for 4 months last fall and had lost 25 pounds. However, he reported experiencing significant appetite reduction and his wife reported it made him drowsy and affected his personality. Since stopping Ozempic he reports gaining 15 pounds back but admits he wasn't making many lifestyle changes at the time. He tends to be more active in the summer. He is wondering if it is worth retrying a GLP1 agonist on the future.        Today's Vitals: There were no vitals taken for this visit.  ----------------    I spent 23 minutes with this patient today. All changes were made via collaborative practice agreement with Dr. Delgado.     A summary of these recommendations was sent via [employer].    Joaquina Alvarez, Pharm.D.  Medication Therapy Management Pharmacist  [facility]    Telemedicine Visit Details  The patient's medications can be safely assessed via a telemedicine encounter.  Type of service:  Telephone visit  Originating Location (pt. Location): Home    Distant Location (provider location):  Off-site  Start Time: 9:02 AM  End Time: 9:25 AM     Medication Therapy Recommendations  No medication therapy recommendations to display

## 2025-08-14 ENCOUNTER — VIRTUAL VISIT (OUTPATIENT)
Dept: PHARMACY | Facility: CLINIC | Age: 70
End: 2025-08-14
Attending: PSYCHIATRY & NEUROLOGY
Payer: COMMERCIAL

## 2025-08-14 DIAGNOSIS — G25.0 ESSENTIAL TREMOR: Primary | ICD-10-CM

## 2025-08-16 ENCOUNTER — HEALTH MAINTENANCE LETTER (OUTPATIENT)
Age: 70
End: 2025-08-16

## 2025-08-22 ENCOUNTER — OFFICE VISIT (OUTPATIENT)
Dept: NEUROLOGY | Facility: CLINIC | Age: 70
End: 2025-08-22
Payer: COMMERCIAL

## 2025-08-22 VITALS
OXYGEN SATURATION: 97 % | DIASTOLIC BLOOD PRESSURE: 85 MMHG | WEIGHT: 240 LBS | HEART RATE: 60 BPM | BODY MASS INDEX: 30.81 KG/M2 | RESPIRATION RATE: 16 BRPM | SYSTOLIC BLOOD PRESSURE: 135 MMHG

## 2025-08-22 DIAGNOSIS — R25.1 TREMOR: Primary | ICD-10-CM

## 2025-08-22 DIAGNOSIS — Z51.81 ENCOUNTER FOR THERAPEUTIC DRUG MONITORING: ICD-10-CM

## 2025-08-22 DIAGNOSIS — G25.0 ESSENTIAL TREMOR: ICD-10-CM

## 2025-08-22 PROCEDURE — 3075F SYST BP GE 130 - 139MM HG: CPT | Performed by: PSYCHIATRY & NEUROLOGY

## 2025-08-22 PROCEDURE — 99214 OFFICE O/P EST MOD 30 MIN: CPT | Performed by: PSYCHIATRY & NEUROLOGY

## 2025-08-22 PROCEDURE — 1126F AMNT PAIN NOTED NONE PRSNT: CPT | Performed by: PSYCHIATRY & NEUROLOGY

## 2025-08-22 PROCEDURE — G2211 COMPLEX E/M VISIT ADD ON: HCPCS | Performed by: PSYCHIATRY & NEUROLOGY

## 2025-08-22 PROCEDURE — 3079F DIAST BP 80-89 MM HG: CPT | Performed by: PSYCHIATRY & NEUROLOGY

## 2025-08-22 RX ORDER — PRIMIDONE 50 MG/1
100 TABLET ORAL 2 TIMES DAILY
Qty: 360 TABLET | Refills: 11 | Status: SHIPPED | OUTPATIENT
Start: 2025-08-22

## 2025-08-22 ASSESSMENT — ACTIVITIES OF DAILY LIVING (ADL)
WORKING: (2) MILDLY ABNORMAL. TREMOR INTERFERES WITH WORK, ABLE TO DO EVERYTHING, BUT WITH ERRORS.
DRINKING_FROM_A_GLASS: (3) MODERATELY ABNORMAL. SPILLS A LOT OR CHANGES STRATEGY TO COMPLETE TASK SUCH AS USING TWO HANDS OR LEANING OVER.
HYGIENE: (3) MODERATELY ABNORMAL. UNABLE TO DO MOST FINE TASKS SUCH AS PUTTING ON LIPSTICK OR SHAVING UNLESS CHANGES STRATEGY SUCH AS USING TWO HANDS OR USING THE LESS AFFECTED HAND.
OVERALL_DISABILITY_WITH_THE_MOST_AFFECTED_TASK: (4) SEVERELY ABNORMAL. CANNOT DO THE TASK.
DRESS: (2) MILDLY ABNORMAL. ABLE TO DO EVERYTHING BUT HAS DIFFICULTY DUE TO TREMOR.
SOCIAL_IMPACT: (2) FEELS EMBARRASSED BY TREMOR IN SOME SOCIAL SITUATIONS OR PROFESSIONAL MEETINGS.
TOTAL_SCORE: 33
CARRYING_FOOD_TRAYS_PLATES_OR_SIMILAR_ITEMS: (3) MODERATELY ABNORMAL. USES STRATEGIES SUCH AS HOLDING TIGHTLY AGAINST BODY TO CARRY.
WRITING: (4) SEVERELY ABNORMAL. CANNOT WRITE.
OVERALL_DISABILITY_WITH_THE_MOST_AFFECTED_TASK: WRITING
SPEAKING: (2) MILD VOICE TREMOR. ALL WORDS EASILY UNDERSTOOD.
FEEDING_WITH_A_SPOON: (3) MODERATELY ABNORMAL. SPILLS A LOT OR CHANGES STRATEGY TO COMPLETE TASK SUCH AS USING TWO HANDS OR LEANING OVER.
POURING: (3) MODERATELY ABNORMAL. MUST USE TWO HANDS OR USES OTHER STRATEGIES TO AVOID SPILLING.
USING_KEYS: (2) MILDLY ABNORMAL. COMMONLY MISSES TARGET BUT STILL ROUTINELY PUTS KEY IN LOCK WITH ONE HAND.

## 2025-08-22 ASSESSMENT — PAIN SCALES - GENERAL: PAINLEVEL_OUTOF10: NO PAIN (0)

## (undated) DEVICE — SYR 30ML LL W/O NDL 302832

## (undated) DEVICE — SU MONOCRYL 2-0 CT-2 27" Y333H

## (undated) DEVICE — GOWN IMPERVIOUS SPECIALTY XL/XLONG 39049

## (undated) DEVICE — SOL WATER IRRIG 1000ML BOTTLE 2F7114

## (undated) DEVICE — GLOVE PROTEXIS BLUE W/NEU-THERA 6.5  2D73EB65

## (undated) DEVICE — CUSTOM PACK CYSTO PREFERRED SOT5BCYHEA

## (undated) DEVICE — WRAP EZY KNEE

## (undated) DEVICE — SOL NACL 0.9% IRRIG 3000ML BAG 2B7477

## (undated) DEVICE — GLOVE PROTEXIS W/NEU-THERA 7.5  2D73TE75

## (undated) DEVICE — PREP POVIDONE-IODINE 7.5% SCRUB 4OZ BOTTLE MDS093945

## (undated) DEVICE — SU VICRYL 0 CT-1 CR 8X18" J740D

## (undated) DEVICE — GLOVE PROTEXIS POWDER FREE 8.5 ORTHOPEDIC 2D73ET85

## (undated) DEVICE — JUMPSUIT CYSTO DISP SD-100

## (undated) DEVICE — BONE CEMENT MIXEVAC III HI VAC KIT  0206-015-000

## (undated) DEVICE — BONE CLEANING TIP INTERPULSE  0210-010-000

## (undated) DEVICE — BAG URINARY DRAIN 4000ML LF 153509

## (undated) DEVICE — ESU GROUND PAD UNIVERSAL W/O CORD

## (undated) DEVICE — SOLUTION WOUND CLEANSING 3/4OZ 10% PVP EA-L3011FB-50

## (undated) DEVICE — Device

## (undated) DEVICE — BLADE SAW RECIP STRK LONG 70X12.5X0.9MM 0277-096-278

## (undated) DEVICE — SOLUTION IRRIG 2B7127 .9NS 3000ML BAG

## (undated) DEVICE — DRSG XEROFORM 5X9" 8884431605

## (undated) DEVICE — BLADE SAW SAGITTAL STRK 19.5X90X1.20MM 2108-109-000S17

## (undated) DEVICE — STRAP CATH LEG ADJUSTABLE 0814-8200

## (undated) DEVICE — MAT FLOOR SURGICAL 40X38 0702140238

## (undated) DEVICE — DRAPE CONVERTORS U-DRAPE 60X72" 8476

## (undated) DEVICE — CAST PADDING 6" STERILE 9046S

## (undated) DEVICE — CATH URETERAL OPEN END 5FRX70CM M0064002010

## (undated) DEVICE — PACK TOTAL KNEE SOP15TKFSD

## (undated) DEVICE — MANIFOLD NEPTUNE 4 PORT 700-20

## (undated) DEVICE — SYR 50ML SLIP TIP W/O NDL 309654

## (undated) DEVICE — GLOVE PROTEXIS POWDER FREE 6.5 ORTHOPEDIC 2D73ET65

## (undated) DEVICE — DRSG ABDOMINAL 07 1/2X8" 7197D

## (undated) DEVICE — PREP CHLORAPREP 26ML TINTED ORANGE  260815

## (undated) DEVICE — DRAPE SHEET REV FOLD 3/4 9349

## (undated) DEVICE — SOL WATER IRRIG 3000ML BAG 2B7117

## (undated) DEVICE — TUBING SET THERMEDX UROLOGY SGL USE LL0006

## (undated) DEVICE — GLOVE SURG PI ULTRA TOUCH M SZ 7-1/2 LF

## (undated) DEVICE — IMM KNEE 20" 0814-2660

## (undated) DEVICE — SUCTION IRR SYSTEM W/O TIP INTERPULSE HANDPIECE 0210-100-000

## (undated) DEVICE — LINEN TOWEL PACK X5 5464

## (undated) DEVICE — KIT ENDO FIRST STEP DISINFECTANT 200ML W/POUCH EP-4

## (undated) DEVICE — SUCTION MANIFOLD NEPTUNE 2 SYS 1 PORT 702-025-000

## (undated) DEVICE — EVACUATOR BLADDER UROVAC LATEX M0067301250

## (undated) DEVICE — GLOVE PROTEXIS BLUE W/NEU-THERA 8.5  2D73EB85

## (undated) DEVICE — TUBING SUCTION MEDI-VAC 1/4"X20' N620A - HE

## (undated) DEVICE — SOL NACL 0.9% IRRIG 1000ML BOTTLE 07138-09

## (undated) RX ORDER — FENTANYL CITRATE 50 UG/ML
INJECTION, SOLUTION INTRAMUSCULAR; INTRAVENOUS
Status: DISPENSED
Start: 2018-03-15

## (undated) RX ORDER — ATROPA BELLADONNA AND OPIUM 16.2; 3 MG/1; MG/1
SUPPOSITORY RECTAL
Status: DISPENSED
Start: 2021-11-26

## (undated) RX ORDER — LIDOCAINE HYDROCHLORIDE 20 MG/ML
INJECTION, SOLUTION EPIDURAL; INFILTRATION; INTRACAUDAL; PERINEURAL
Status: DISPENSED
Start: 2018-03-15

## (undated) RX ORDER — PROPOFOL 10 MG/ML
INJECTION, EMULSION INTRAVENOUS
Status: DISPENSED
Start: 2018-03-15

## (undated) RX ORDER — GLYCOPYRROLATE 0.2 MG/ML
INJECTION, SOLUTION INTRAMUSCULAR; INTRAVENOUS
Status: DISPENSED
Start: 2018-03-15

## (undated) RX ORDER — ONDANSETRON 2 MG/ML
INJECTION INTRAMUSCULAR; INTRAVENOUS
Status: DISPENSED
Start: 2018-03-15

## (undated) RX ORDER — LIDOCAINE HYDROCHLORIDE 20 MG/ML
JELLY TOPICAL
Status: DISPENSED
Start: 2021-11-26

## (undated) RX ORDER — CEFAZOLIN SODIUM 2 G/100ML
INJECTION, SOLUTION INTRAVENOUS
Status: DISPENSED
Start: 2018-03-15

## (undated) RX ORDER — CELECOXIB 200 MG/1
CAPSULE ORAL
Status: DISPENSED
Start: 2018-03-15